# Patient Record
Sex: FEMALE | Race: OTHER | HISPANIC OR LATINO | Employment: OTHER | URBAN - METROPOLITAN AREA
[De-identification: names, ages, dates, MRNs, and addresses within clinical notes are randomized per-mention and may not be internally consistent; named-entity substitution may affect disease eponyms.]

---

## 2017-06-27 ENCOUNTER — APPOINTMENT (EMERGENCY)
Dept: RADIOLOGY | Facility: HOSPITAL | Age: 32
End: 2017-06-27
Payer: COMMERCIAL

## 2017-06-27 ENCOUNTER — HOSPITAL ENCOUNTER (EMERGENCY)
Facility: HOSPITAL | Age: 32
Discharge: HOME | End: 2017-06-27
Admitting: EMERGENCY MEDICINE
Payer: COMMERCIAL

## 2017-06-27 VITALS
WEIGHT: 239.7 LBS | SYSTOLIC BLOOD PRESSURE: 136 MMHG | HEART RATE: 103 BPM | TEMPERATURE: 99.8 F | OXYGEN SATURATION: 97 % | RESPIRATION RATE: 18 BRPM | DIASTOLIC BLOOD PRESSURE: 61 MMHG

## 2017-06-27 DIAGNOSIS — S50.10XA: ICD-10-CM

## 2017-06-27 DIAGNOSIS — S51.809A: Primary | ICD-10-CM

## 2017-06-27 PROCEDURE — 73090 X-RAY EXAM OF FOREARM: CPT

## 2017-06-27 PROCEDURE — 99283 EMERGENCY DEPT VISIT LOW MDM: CPT

## 2017-06-27 RX ORDER — LEVOFLOXACIN 500 MG/1
500 TABLET, FILM COATED ORAL DAILY
Qty: 7 TABLET | Refills: 0 | Status: SHIPPED | OUTPATIENT
Start: 2017-06-27 | End: 2017-07-05

## 2017-06-27 RX ORDER — NAPROXEN 500 MG/1
500 TABLET ORAL 2 TIMES DAILY WITH MEALS
Qty: 14 TABLET | Refills: 0 | Status: SHIPPED | OUTPATIENT
Start: 2017-06-27 | End: 2018-07-27

## 2017-06-27 RX ORDER — NAPROXEN 250 MG/1
500 TABLET ORAL ONCE
Status: COMPLETED | OUTPATIENT
Start: 2017-06-27 | End: 2017-06-27

## 2017-06-27 RX ADMIN — NAPROXEN 500 MG: 250 TABLET ORAL at 19:42

## 2017-07-05 ENCOUNTER — HOSPITAL ENCOUNTER (EMERGENCY)
Facility: HOSPITAL | Age: 32
End: 2017-07-05
Attending: EMERGENCY MEDICINE | Admitting: EMERGENCY MEDICINE
Payer: COMMERCIAL

## 2017-07-05 VITALS
TEMPERATURE: 98 F | HEART RATE: 78 BPM | RESPIRATION RATE: 18 BRPM | HEIGHT: 62 IN | OXYGEN SATURATION: 98 % | BODY MASS INDEX: 45.08 KG/M2 | DIASTOLIC BLOOD PRESSURE: 89 MMHG | WEIGHT: 245 LBS | SYSTOLIC BLOOD PRESSURE: 132 MMHG

## 2017-07-05 DIAGNOSIS — R45.851 SUICIDAL IDEATION: Primary | ICD-10-CM

## 2017-07-05 LAB
AMPHETAMINES SERPL QL SCN: NEGATIVE
BARBITURATES UR QL: NEGATIVE
BENZODIAZ UR QL: NEGATIVE
COCAINE UR QL: NEGATIVE
ETHANOL EXG-MCNC: 0 MG/DL
HCG UR QL: NORMAL
METHADONE UR QL: NEGATIVE
OPIATES UR QL SCN: NEGATIVE
PCP UR QL: NEGATIVE
THC UR QL: POSITIVE

## 2017-07-05 PROCEDURE — 82075 ASSAY OF BREATH ETHANOL: CPT | Performed by: EMERGENCY MEDICINE

## 2017-07-05 PROCEDURE — 80307 DRUG TEST PRSMV CHEM ANLYZR: CPT | Performed by: EMERGENCY MEDICINE

## 2017-07-05 PROCEDURE — 99285 EMERGENCY DEPT VISIT HI MDM: CPT

## 2017-07-05 PROCEDURE — 81025 URINE PREGNANCY TEST: CPT | Performed by: EMERGENCY MEDICINE

## 2017-07-05 RX ORDER — QUETIAPINE FUMARATE 25 MG/1
50 TABLET, FILM COATED ORAL ONCE
Status: COMPLETED | OUTPATIENT
Start: 2017-07-05 | End: 2017-07-05

## 2017-07-05 RX ORDER — BUPROPION HYDROCHLORIDE 200 MG/1
200 TABLET, EXTENDED RELEASE ORAL DAILY
COMMUNITY

## 2017-07-05 RX ORDER — NICOTINE 21 MG/24HR
21 PATCH, TRANSDERMAL 24 HOURS TRANSDERMAL ONCE
Status: DISCONTINUED | OUTPATIENT
Start: 2017-07-05 | End: 2017-07-06 | Stop reason: HOSPADM

## 2017-07-05 RX ORDER — ESCITALOPRAM OXALATE 20 MG/1
5 TABLET ORAL DAILY
COMMUNITY

## 2017-07-05 RX ORDER — ALPRAZOLAM 1 MG/1
TABLET ORAL
COMMUNITY
End: 2019-07-18

## 2017-07-05 RX ORDER — ALPRAZOLAM 0.5 MG/1
1 TABLET ORAL ONCE
Status: COMPLETED | OUTPATIENT
Start: 2017-07-05 | End: 2017-07-05

## 2017-07-05 RX ORDER — TRAZODONE HYDROCHLORIDE 100 MG/1
100 TABLET ORAL
Status: DISCONTINUED | OUTPATIENT
Start: 2017-07-05 | End: 2017-07-06 | Stop reason: HOSPADM

## 2017-07-05 RX ADMIN — NICOTINE 21 MG: 21 PATCH, EXTENDED RELEASE TRANSDERMAL at 16:48

## 2017-07-05 RX ADMIN — TRAZODONE HYDROCHLORIDE 100 MG: 100 TABLET ORAL at 21:09

## 2017-07-05 RX ADMIN — QUETIAPINE FUMARATE 50 MG: 25 TABLET, FILM COATED ORAL at 21:09

## 2017-07-05 RX ADMIN — ALPRAZOLAM 1 MG: 0.5 TABLET ORAL at 21:09

## 2018-07-27 ENCOUNTER — HOSPITAL ENCOUNTER (EMERGENCY)
Facility: HOSPITAL | Age: 33
Discharge: HOME | End: 2018-07-27
Attending: EMERGENCY MEDICINE | Admitting: EMERGENCY MEDICINE
Payer: COMMERCIAL

## 2018-07-27 ENCOUNTER — APPOINTMENT (EMERGENCY)
Dept: CT IMAGING | Facility: HOSPITAL | Age: 33
End: 2018-07-27
Payer: COMMERCIAL

## 2018-07-27 VITALS
SYSTOLIC BLOOD PRESSURE: 99 MMHG | TEMPERATURE: 98.8 F | OXYGEN SATURATION: 99 % | HEART RATE: 70 BPM | BODY MASS INDEX: 43.35 KG/M2 | DIASTOLIC BLOOD PRESSURE: 55 MMHG | WEIGHT: 237 LBS | RESPIRATION RATE: 16 BRPM

## 2018-07-27 DIAGNOSIS — R51.9 HEADACHE: Primary | ICD-10-CM

## 2018-07-27 LAB — EXT PREG TEST URINE: NEGATIVE

## 2018-07-27 PROCEDURE — 96366 THER/PROPH/DIAG IV INF ADDON: CPT

## 2018-07-27 PROCEDURE — 96375 TX/PRO/DX INJ NEW DRUG ADDON: CPT

## 2018-07-27 PROCEDURE — 81025 URINE PREGNANCY TEST: CPT | Performed by: EMERGENCY MEDICINE

## 2018-07-27 PROCEDURE — 70450 CT HEAD/BRAIN W/O DYE: CPT

## 2018-07-27 PROCEDURE — 96365 THER/PROPH/DIAG IV INF INIT: CPT

## 2018-07-27 PROCEDURE — 96361 HYDRATE IV INFUSION ADD-ON: CPT

## 2018-07-27 PROCEDURE — 99284 EMERGENCY DEPT VISIT MOD MDM: CPT

## 2018-07-27 RX ORDER — LIDOCAINE 50 MG/G
1 PATCH TOPICAL ONCE
Status: DISCONTINUED | OUTPATIENT
Start: 2018-07-27 | End: 2018-07-27 | Stop reason: HOSPADM

## 2018-07-27 RX ORDER — METOCLOPRAMIDE HYDROCHLORIDE 5 MG/ML
10 INJECTION INTRAMUSCULAR; INTRAVENOUS ONCE
Status: COMPLETED | OUTPATIENT
Start: 2018-07-27 | End: 2018-07-27

## 2018-07-27 RX ORDER — MAGNESIUM SULFATE HEPTAHYDRATE 40 MG/ML
2 INJECTION, SOLUTION INTRAVENOUS ONCE
Status: COMPLETED | OUTPATIENT
Start: 2018-07-27 | End: 2018-07-27

## 2018-07-27 RX ORDER — DIPHENHYDRAMINE HYDROCHLORIDE 50 MG/ML
25 INJECTION INTRAMUSCULAR; INTRAVENOUS ONCE
Status: COMPLETED | OUTPATIENT
Start: 2018-07-27 | End: 2018-07-27

## 2018-07-27 RX ORDER — KETOROLAC TROMETHAMINE 30 MG/ML
15 INJECTION, SOLUTION INTRAMUSCULAR; INTRAVENOUS ONCE
Status: COMPLETED | OUTPATIENT
Start: 2018-07-27 | End: 2018-07-27

## 2018-07-27 RX ADMIN — METOCLOPRAMIDE 10 MG: 5 INJECTION, SOLUTION INTRAMUSCULAR; INTRAVENOUS at 16:43

## 2018-07-27 RX ADMIN — KETOROLAC TROMETHAMINE 15 MG: 30 INJECTION, SOLUTION INTRAMUSCULAR at 16:40

## 2018-07-27 RX ADMIN — LIDOCAINE 1 PATCH: 50 PATCH TOPICAL at 17:57

## 2018-07-27 RX ADMIN — DIPHENHYDRAMINE HYDROCHLORIDE 25 MG: 50 INJECTION, SOLUTION INTRAMUSCULAR; INTRAVENOUS at 16:40

## 2018-07-27 RX ADMIN — SODIUM CHLORIDE 1000 ML: 0.9 INJECTION, SOLUTION INTRAVENOUS at 16:40

## 2018-07-27 RX ADMIN — MAGNESIUM SULFATE HEPTAHYDRATE 2 G: 40 INJECTION, SOLUTION INTRAVENOUS at 17:57

## 2018-07-27 NOTE — ED PROVIDER NOTES
History  Chief Complaint   Patient presents with    Headache     C/O headache, dizziness, and nausea x3 days denies hx of migrains took tylenol without relife     35year old female presents for evaluation of headache that started 3 days ago, gradual onset but became worse today  Constant, radiates from neck b/l up to temples  Patient states she gets headaches infrequently but this one is worse than previous  Associated nausea without vomiting  No changes in vision  Reports sensitivity to light  Denies trauma  No neuro deficits  Prior to Admission Medications   Prescriptions Last Dose Informant Patient Reported? Taking? ALPRAZolam (XANAX) 1 mg tablet   Yes No   Sig: Take by mouth daily at bedtime as needed for anxiety   QUEtiapine Fumarate (SEROQUEL PO)   Yes No   Sig: Take 50 mg by mouth daily at bedtime     TRAZODONE HCL PO   Yes No   Sig: Take 100 mg by mouth daily at bedtime     buPROPion (WELLBUTRIN SR) 200 MG 12 hr tablet   Yes No   Sig: Take 200 mg by mouth daily   escitalopram (LEXAPRO) 20 mg tablet   Yes No   Sig: Take 5 mg by mouth daily      Facility-Administered Medications: None       Past Medical History:   Diagnosis Date    Anxiety     Bipolar disease, chronic ([facility] [address] )        History reviewed  No pertinent surgical history  History reviewed  No pertinent family history  I have reviewed and agree with the history as documented  Social History   Substance Use Topics    Smoking status: Current Every Day Smoker     Packs/day: 0 50    Smokeless tobacco: Never Used    Alcohol use No        Review of Systems   Constitutional: Negative for chills, diaphoresis and fever  HENT: Negative for congestion and rhinorrhea  Eyes: Negative for pain and visual disturbance  Respiratory: Negative for cough, shortness of breath and wheezing  Cardiovascular: Negative for chest pain and leg swelling  Gastrointestinal: Negative for abdominal pain, diarrhea, nausea and vomiting  Genitourinary: Negative for difficulty urinating, dysuria, frequency and urgency  Musculoskeletal: Positive for neck pain  Negative for back pain and neck stiffness  Skin: Negative for color change and rash  Neurological: Positive for headaches  Negative for syncope and numbness  All other systems reviewed and are negative  Physical Exam  ED Triage Vitals   Temperature Pulse Respirations Blood Pressure SpO2   07/27/18 1549 07/27/18 1549 07/27/18 1549 07/27/18 1629 07/27/18 1549   98 8 °F (37 1 °C) 81 18 116/72 97 %      Temp Source Heart Rate Source Patient Position - Orthostatic VS BP Location FiO2 (%)   07/27/18 1549 07/27/18 1549 07/27/18 1549 07/27/18 1549 --   Oral Monitor Sitting Right arm       Pain Score       07/27/18 1549       Worst Possible Pain           Orthostatic Vital Signs  Vitals:    07/27/18 1549 07/27/18 1629 07/27/18 1802   BP:  116/72 99/55   Pulse: 81  70   Patient Position - Orthostatic VS: Sitting Lying Sitting       Physical Exam   Constitutional: She is oriented to person, place, and time  She appears well-developed and well-nourished  No distress  HENT:   Head: Normocephalic and atraumatic  Eyes: Conjunctivae and EOM are normal  Pupils are equal, round, and reactive to light  Visual fields intact in all 4 quadrants  Neck: Normal range of motion  Neck supple  Cardiovascular: Normal rate and regular rhythm  Pulmonary/Chest: Effort normal and breath sounds normal  No respiratory distress  She has no wheezes  She has no rales  Abdominal: Soft  Bowel sounds are normal  There is no tenderness  There is no guarding  Musculoskeletal: Normal range of motion  She exhibits no edema or tenderness  Very tender in b/l trapezius up to paraspinal neck  Neurological: She is alert and oriented to person, place, and time  She displays normal reflexes  No cranial nerve deficit or sensory deficit  She exhibits normal muscle tone   Coordination normal    HINTS exam reassuring, no dysdiadochokinesis, finger to nose intact, heel-to-shin intact, negative Romberg, able to ambulate  Skin: Skin is warm  She is not diaphoretic  No erythema  Psychiatric: She has a normal mood and affect  Her behavior is normal    Nursing note and vitals reviewed  ED Medications  Medications   lidocaine (LIDODERM) 5 % patch 1 patch (1 patch Transdermal Medication Applied 7/27/18 1757)   ketorolac (TORADOL) injection 15 mg (15 mg Intravenous Given 7/27/18 1640)   metoclopramide (REGLAN) injection 10 mg (10 mg Intravenous Given 7/27/18 1643)   sodium chloride 0 9 % bolus 1,000 mL (0 mL Intravenous Stopped 7/27/18 1756)   diphenhydrAMINE (BENADRYL) injection 25 mg (25 mg Intravenous Given 7/27/18 1640)   magnesium sulfate 2 g/50 mL IVPB (premix) 2 g (0 g Intravenous Stopped 7/27/18 1936)       Diagnostic Studies  Results Reviewed     Procedure Component Value Units Date/Time    POCT pregnancy, urine [[id number]]  (Normal) Resulted:  07/27/18 1618    Lab Status:  Final result Updated:  07/27/18 1618     EXT PREG TEST UR (Ref: Negative) Negative                 CT head without contrast   Final Result by Tarun Epperson MD (07/27 1925)      No acute intracranial abnormality  Workstation performed: [id number]               Procedures  Procedures      Phone Consults  ED Phone Contact    ED Course  ED Course as of Jul 27 1958 Fri Jul 27, 2018 1757 Upon reassessment, patient reports improvement of symptoms but headache still present  Will administer mag, lidoderm patch and reassess  1928 Patient reports full resolution of symptoms  Will DC, follow up with PCP  MDM  Number of Diagnoses or Management Options  Headache:   Diagnosis management comments: 35year old female presenting with tension headache  Will treat symptoms toradol, reglan, benadryl, fluids  Reassess  I have personally discussed discharge instructions with the patient   Advised to return to the emergency department immediately with worsening symptoms, pain, fever, vomiting or any further concerns  Patient also instructed to follow up with primary care provider as soon as possible for reassessment and further evaluation  CritCare Time    Disposition  Final diagnoses:   Headache     Time reflects when diagnosis was documented in both MDM as applicable and the Disposition within this note     Time User Action Codes Description Comment    7/27/2018  7:29 PM Floyd Lojayasmeen Add [R51] Headache       ED Disposition     ED Disposition Condition Comment    Discharge  [address] [place] [address] discharge to home/self care  Condition at discharge: Stable        Follow-up Information     Follow up With Specialties Details Why Contact Info Additional [facility] DET[facility] In 1 week For further evaluation [place] [id number]  [address] Emergency Department Emergency Medicine  If symptoms worsen [address] [facility] ED, [address]  , [place], [address] [facility], [address]          Discharge Medication List as of 7/27/2018  7:29 PM      CONTINUE these medications which have NOT CHANGED    Details   ALPRAZolam (XANAX) 1 mg tablet Take by mouth daily at bedtime as needed for anxiety, Historical Med      buPROPion (WELLBUTRIN SR) 200 MG 12 hr tablet Take 200 mg by mouth daily, Historical Med      escitalopram (LEXAPRO) 20 mg tablet Take 5 mg by mouth daily, Historical Med      QUEtiapine Fumarate (SEROQUEL PO) Take 50 mg by mouth daily at bedtime  , Historical Med      TRAZODONE HCL PO Take 100 mg by mouth daily at bedtime  , Historical Med           No discharge procedures on file  ED Provider  Attending physically available and evaluated [address] [place] [address]  I managed the patient along with the ED Attending      Electronically Signed by         [facility] Tesha Parrish DO  07/27/18 1958

## 2018-07-27 NOTE — ED ATTENDING ATTESTATION
Maryann Muñoz MD, saw and evaluated the patient  I have discussed the patient with the resident/non-physician practitioner and agree with the resident's/non-physician practitioner's findings, Plan of Care, and MDM as documented in the resident's/non-physician practitioner's note, except where noted  All available labs and Radiology studies were reviewed  At this point I agree with the current assessment done in the Emergency Department  I have conducted an independent evaluation of this patient a history and physical is as follows:  Patient with hx of headaches, c/o posterior neck and occipital headache, started 3 days back, came on gradually, no shortness/sharp onset, no fever, no N/V  On exam no acute distress stable, pupils equal round reactive light, no cranial nerve or focal neuro deficit  Will treat as migraine, give a Benadryl, Toradol, Reglan, IV fluids  Patient was re-evaluated, still having pain but no distress, we will do CT head at this point, give magnesium  CT scan does not show any acute abnormality  Patient feels better after migraine meds  We will discharge      Critical Care Time  CritCare Time    Procedures

## 2018-07-27 NOTE — DISCHARGE INSTRUCTIONS
Acute Headache, Ambulatory Care   GENERAL INFORMATION:   An acute headache  is pain or discomfort that starts suddenly and gets worse quickly  The cause of an acute headache may not be known  It may be triggered by stress, fatigue, hormones, food, or trauma  Common related symptoms include the following:   · Fever    · Sinus pressure    · Loss of memory    · Nausea or vomiting    · Problems with your vision, such as watery or red eyes, loss of vision, or pain in bright light    · Stiff neck    · Tenderness of the head and neck area    · Trouble staying awake, or being less alert than usual     · Weakness or less energy  Seek immediate care for the following symptoms:   · Severe pain    · A headache that occurs after a blow to the head, a fall, or other trauma     · Confusion or forgetfulness    · Numbness on one side of your face or body  Treatment for an acute headache  may include medicine to decrease pain  You may also need biofeedback or cognitive behavioral therapy  Ask your healthcare provider about these and other treatments for an acute headache  Manage my symptoms:   · Apply heat  on your head for 20 to 30 minutes every 2 hours for as many days as directed  Heat helps decrease pain and muscle spasms  You may alternate heat and ice  · Apply ice  on your head for 15 to 20 minutes every hour or as directed  Use an ice pack, or put crushed ice in a plastic bag  Cover it with a towel  Ice helps decrease pain  · Relax your muscles  Lie down in a comfortable position and close your eyes  Relax your muscles slowly  Start at your toes and work your way up your body  · Keep a record of your headaches  Write down when your headaches start and stop  Include your symptoms and what you were doing when the headache began  Record what you ate or drank for 24 hours before the headache started  Describe the pain and where it hurts  Keep track of what you did to treat your headache and whether it worked    Follow up with your healthcare provider as directed:  Bring your headache record with you when you see your healthcare provider  Write down your questions so you remember to ask them during your visits  CARE AGREEMENT:   You have the right to help plan your care  Learn about your health condition and how it may be treated  Discuss treatment options with your caregivers to decide what care you want to receive  You always have the right to refuse treatment  The above information is an  only  It is not intended as medical advice for individual conditions or treatments  Talk to your doctor, nurse or pharmacist before following any medical regimen to see if it is safe and effective for you  © 2014 [address] is for End User's use only and may not be sold, redistributed or otherwise used for commercial purposes  All illustrations and images included in CareNotes® are the copyrighted property of A D A M , Inc  or Jayme Cummings

## 2018-07-27 NOTE — ED NOTES
Patient given ham sandwich, pretzels and water per request, Dr Benjamin Alert aware       Titus Diaz, MADIE  07/27/18 3354

## 2018-10-13 ENCOUNTER — HOSPITAL ENCOUNTER (EMERGENCY)
Facility: HOSPITAL | Age: 33
Discharge: HOME | End: 2018-10-13
Attending: EMERGENCY MEDICINE
Payer: COMMERCIAL

## 2018-10-13 VITALS
OXYGEN SATURATION: 99 % | WEIGHT: 243 LBS | BODY MASS INDEX: 44.45 KG/M2 | DIASTOLIC BLOOD PRESSURE: 72 MMHG | SYSTOLIC BLOOD PRESSURE: 151 MMHG | TEMPERATURE: 98.1 F | HEART RATE: 77 BPM | RESPIRATION RATE: 20 BRPM

## 2018-10-13 DIAGNOSIS — B35.3 ATHLETE'S FOOT, LEFT: Primary | ICD-10-CM

## 2018-10-13 DIAGNOSIS — S90.822A BLISTER OF LEFT FOOT: ICD-10-CM

## 2018-10-13 DIAGNOSIS — B35.3 ATHLETE'S FOOT ON RIGHT: ICD-10-CM

## 2018-10-13 PROCEDURE — 99282 EMERGENCY DEPT VISIT SF MDM: CPT

## 2018-10-13 RX ORDER — TOLNAFTATE 1 G/100G
POWDER TOPICAL 2 TIMES DAILY
Qty: 45 G | Refills: 0 | Status: SHIPPED | OUTPATIENT
Start: 2018-10-13 | End: 2019-05-19

## 2018-10-13 RX ORDER — IBUPROFEN 400 MG/1
400 TABLET ORAL EVERY 6 HOURS PRN
Qty: 30 TABLET | Refills: 0 | Status: SHIPPED | OUTPATIENT
Start: 2018-10-13

## 2018-10-13 RX ORDER — ACETAMINOPHEN 500 MG
500 TABLET ORAL EVERY 6 HOURS PRN
Qty: 30 TABLET | Refills: 0 | Status: SHIPPED | OUTPATIENT
Start: 2018-10-13 | End: 2020-02-28

## 2018-10-13 NOTE — ED PROVIDER NOTES
History  Chief Complaint   Patient presents with    Wound Check     hx of diabeties noted wound on right foot Monday not getting better  Yesterday with fever, runny nose, sore throat and left ear pain       History provided by:  Patient (Male significant other in room)  Foot Injury - Major   Location:  Foot (Left foot at the heel )  Time since incident:  5 days  Injury: yes    Pain details:     Quality:  Aching    Radiates to:  Does not radiate    Severity:  Mild    Onset quality:  Gradual  Chronicity:  New  Foreign body present:  No foreign bodies  Prior injury to area:  No  Worsened by:  Nothing  Ineffective treatments:  None tried  Associated symptoms: swelling    Associated symptoms: no back pain, no decreased ROM, no fatigue, no fever, no itching, no muscle weakness, no numbness, no stiffness and no tingling    Risk factors: obesity        Prior to Admission Medications   Prescriptions Last Dose Informant Patient Reported? Taking? ALPRAZolam (XANAX) 1 mg tablet   Yes No   Sig: Take by mouth daily at bedtime as needed for anxiety   QUEtiapine Fumarate (SEROQUEL PO)  Self Yes No   Sig: Take 300 mg by mouth daily at bedtime     TRAZODONE HCL PO  Self Yes No   Sig: Take 150 mg by mouth daily at bedtime     buPROPion (WELLBUTRIN SR) 200 MG 12 hr tablet   Yes No   Sig: Take 200 mg by mouth daily   escitalopram (LEXAPRO) 20 mg tablet   Yes No   Sig: Take 5 mg by mouth daily      Facility-Administered Medications: None       Past Medical History:   Diagnosis Date    Anxiety     B[facility] disease, chronic ([facility] [address] )     Diabetes mellitus ([facility] )        History reviewed  No pertinent surgical history  History reviewed  No pertinent family history  I have reviewed and agree with the history as documented      Social History   Substance Use Topics    Smoking status: Current Every Day Smoker     Packs/day: 0 50    Smokeless tobacco: Never Used    Alcohol use No        Review of Systems   Constitutional: Negative for fatigue and fever  HENT: Negative for facial swelling  Eyes: Negative for pain  Respiratory: Negative for cough  Cardiovascular: Negative for leg swelling  Gastrointestinal: Negative for abdominal pain  Endocrine: Negative for polydipsia  Genitourinary: Negative for frequency  Musculoskeletal: Negative for back pain and stiffness  Skin: Negative for itching  Left heel blister  Allergic/Immunologic: Negative for food allergies  Neurological: Negative for headaches  Hematological: Does not bruise/bleed easily  Psychiatric/Behavioral: Positive for behavioral problems  Physical Exam  Physical Exam   Constitutional: She is oriented to person, place, and time  She appears well-developed and well-nourished  HENT:   Head: Normocephalic and atraumatic  Eyes: Conjunctivae are normal    Neck: Neck supple  No JVD present  Cardiovascular: Normal rate  Pulmonary/Chest: Effort normal    Abdominal: She exhibits no distension  Genitourinary:   Genitourinary Comments: No complaints deferred  Musculoskeletal: She exhibits no edema or deformity  Patient states edema bilateral feet but no appreciable pitting edema and/or signs of swelling bilaterally  Neurological: She is alert and oriented to person, place, and time  Skin: Skin is warm and dry  Capillary refill takes less than 2 seconds  Patient has skin condition consistent with tinea pedis bilateral feet  Please see picture of open blister left heel  No evidence of infection and/or cellulitis  Psychiatric: She has a normal mood and affect             Vital Signs  ED Triage Vitals [10/13/18 1944]   Temperature Pulse Respirations Blood Pressure SpO2   98 1 °F (36 7 °C) 77 20 151/72 99 %      Temp Source Heart Rate Source Patient Position - Orthostatic VS BP Location FiO2 (%)   Oral Monitor Sitting Right arm --      Pain Score       Worst Possible Pain           Vitals:    10/13/18 1944   BP: 151/72   Pulse: 77   Patient Position - Orthostatic VS: Sitting       Visual Acuity      ED Medications  Medications - No data to display    Diagnostic Studies  Results Reviewed     None                 No orders to display              Procedures  Procedures       Phone Contacts  ED Phone Contact    ED Course                               MDM  Number of Diagnoses or Management Options  Athlete's foot on right:   Athlete's foot, left:   Blister of left foot:   Diagnosis management comments: 27-year-old female presents emergency department for blister to left heel  Patient states has been present for approximately five days  Patient concern for infection  Patient states that she is prediabetic  Patient denies any type of diabetic evaluation and/or follow-up with her primary  Wound appears to be an open blister but no evidence of infection at this time  At this time will treat conservatively and educate patient on how to treat her blister at home with decreasing friction to the area  Also educated on how to clean the area  An want to look for for progression of any type of infection  Do not feel that x-rays will aid in diagnosis or management  Do not feel that antibiotics be of use at this point  will Will refer patient to our wound clinic and the patient may call on Monday for evaluation  Patient was encouraged to follow up with her known primary care for further evaluation and management of her possible prediabetic condition  Patient also educated on how to take care of her athlete's foot  Patient educated on persistent or worsening signs symptoms and to follow up with primary care wound care and/or return to the emergency department  Patient and male significant other admit to understanding and agreement  Patient was discharged in ambulatory stable condition      CritCare Time    Disposition  Final diagnoses:   Athlete's foot, left   Athlete's foot on right   Blister of left foot     Time reflects when diagnosis was documented in both MDM as applicable and the Disposition within this note     Time User Action Codes Description Comment    10/13/2018  8:26 PM Arleen Flores Ludmila Pun of right foot     10/13/2018  8:27 PM Romel Piedra Or Add [B35 3] Athlete's foot, left     10/13/2018  8:27 PM Arleen Jaramillo Add [B35 3] Athlete's foot on right     10/13/2018  8:36 PM Arleen Jaramillo Modify [B35 3] Athlete's foot, left     10/13/2018  8:36 PM Candelaria Piedra [[id number]] Blister of right foot     10/13/2018  8:36 PM Romel Piedra Or Mark [[id number]] Blister of left foot       ED Disposition     ED Disposition Condition Comment    Discharge  Artdeeptiomer Liraj discharge to home/self care  Condition at discharge: Stable        Follow-up Information     Follow up With Specialties Details Why [address] [facility] [address], [address] [facility], Nurse Practitioner   [address] [address]  [phone]         Follow-up with your primary care soon as possible for evaluation of your prediabetic condition             Discharge Medication List as of 10/13/2018  8:39 PM      START taking these medications    Details   acetaminophen (TYLENOL) 500 mg tablet Take 1 tablet (500 mg total) by mouth every 6 (six) hours as needed for mild pain, moderate pain, headaches or fever, Starting Sat 10/13/2018, Normal      ibuprofen (MOTRIN) 400 mg tablet Take 1 tablet (400 mg total) by mouth every 6 (six) hours as needed for mild pain, moderate pain or fever, Starting Sat 10/13/2018, Normal      tolnaftate (TINACTIN) 1 % powder Apply topically 2 (two) times a day, Starting Sat 10/13/2018, Normal         CONTINUE these medications which have NOT CHANGED    Details   ALPRAZolam (XANAX) 1 mg tablet Take by mouth daily at bedtime as needed for anxiety, Historical Med      buPROPion (WELLBUTRIN SR) 200 MG 12 hr tablet Take 200 mg by mouth daily, Historical Med      escitalopram (LEXAPRO) 20 mg tablet Take 5 mg by mouth daily, Historical Med      QUEtiapine Fumarate (SEROQUEL PO) Take 300 mg by mouth daily at bedtime  , Historical Med      TRAZODONE HCL PO Take 150 mg by mouth daily at bedtime  , Historical Med           No discharge procedures on file      ED Provider  Electronically Signed by           Mervin Cornell PA-C  10/13/18 7297

## 2018-10-14 NOTE — DISCHARGE INSTRUCTIONS
Athlete's Foot   WHAT YOU NEED TO KNOW:   Athlete's foot is a foot infection caused by a fungus  DISCHARGE INSTRUCTIONS:   Return to the emergency department if:   · You have a fever or chills  · You have red streaks going up your leg  Contact your healthcare provider if:   · Your infection spreads to other parts of your body  · Your infection is not better in 14 days or is not completely gone in 90 days  · The skin on your foot or leg is red and hot  · You have questions or concerns about your condition or care  Medicines:   · Antifungal medicine  may be given as a cream or pill  You may need a doctor's order for this medicine  Take the medicine until it is gone, even if your feet look like they are healed  · Take your medicine as directed  Contact your healthcare provider if you think your medicine is not helping or if you have side effects  Tell him or her if you are allergic to any medicine  Keep a list of the medicines, vitamins, and herbs you take  Include the amounts, and when and why you take them  Bring the list or the pill bottles to follow-up visits  Carry your medicine list with you in case of an emergency  Follow up with your healthcare provider as directed:  Write down your questions so you remember to ask them during your visits  Prevent the spread of athlete's foot:   · Prevent the spread of this infection to other parts of your body  When you shower, dry your groin area and other parts of your body before you dry your feet  · Keep your feet clean and dry  Wash your feet each day and dry them well, especially between your toes  After your feet are dry, put powder on your feet and between your toes  Wear clean cotton or wool socks each day  Put your socks on first so you do not spread the infection to other areas of your body  Wear sandals, canvas tennis shoes, or other shoes that allow air to flow to your feet  This helps keep your feet dry   Do not use shoes that are tight, or made of plastic or rubber  · Soak your feet in an astringent (drying) solution as directed  if you have blisters  You may need to do this for 20 to 30 minutes, 2 times each day to help dry out the blisters  · Wear shoes in public areas  Wear shower shoes or sandals in warm, damp areas  This includes shower stalls, near swimming pools, and locker rooms  Do not share socks or shoes  Do not use public swimming pools  © 2017 [facility] Information is for End User's use only and may not be sold, redistributed or otherwise used for commercial purposes  All illustrations and images included in CareNotes® are the copyrighted property of A D A M , Inc  or Jayme Cummings  The above information is an  only  It is not intended as medical advice for individual conditions or treatments  Talk to your doctor, nurse or pharmacist before following any medical regimen to see if it is safe and effective for you  Blister   WHAT YOU NEED TO KNOW:   Blisters are fluid-filled pockets on the surface of your skin  A blister forms when hot or moist skin rubs or pushes against an object repeatedly  Blisters mostly occur on body parts that are used often, or move freely, such as your hands or feet  Pain can be mild or severe, depending on the size of your blister  DISCHARGE INSTRUCTIONS:   Contact your healthcare provider if:   · You have increased pain, redness, and swelling  · There is a bad-smelling fluid coming from your blister  · Your blister does not heal within the amount of time your healthcare provider says it should  · You have a fever, chills, or body aches  · You have questions or concerns about your condition or care  Care for your blister:  Do not pop your blister or tear the skin on it  This could cause infection and slow the healing process   The following will help protect your blister area:  · Wash your hands before you care for your blister  to help prevent infection  Use soap and water  Wash your hands often, and after you use the bathroom, change a child's diapers, or sneeze  · Clean your blister as directed  Carefully remove your bandage  If the bandage sticks to your blister, pour saline on it  This will help the bandage come off more easily and prevent further skin damage  Wash the blister area with soap or saline and water  Gently pat the area dry  · Look for signs of infection  Check the area around your blister for swelling, redness, or pain  · Apply clean bandages as directed  Change your bandages when they get wet, dirty, or soaked with fluid  Your healthcare provider may tell you to use certain moist bandages or hydrogels to prevent them from sticking to your wound  You may need a bandage that absorbs well if your blister has excess fluid  You may be told to wear a second bandage for extra protection  · Take medicine for pain as directed  Prevent another blister:   · Wear synthetic clothing  Acrylic-blend, and moisture-wicking fabrics will help prevent moisture buildup and friction on your skin  These fabrics will also prevent your blister from sticking to them  · Use lubricating ointment  Emollient or silicone ointments may prevent blisters during activities that last 1 hour or less  Do not use them for activities that will last longer than 1 hour  · Wear antiperspirant on your feet as directed  Reduced moisture on your feet will help prevent blisters  · Wear shoes that fit well  Shoes that rub your feet may cause or worsen blisters  Wear cushioned insoles as directed  Follow up with your healthcare provider as directed: You may need to return to have your blister drained if it is large  Write down your questions so you remember to ask them during your visits  © 2017 [employer] Information is for End User's use only and may not be sold, redistributed or otherwise used for commercial purposes  All illustrations and images included in CareNotes® are the copyrighted property of A D A M , Inc  or Jayme Cummings  The above information is an  only  It is not intended as medical advice for individual conditions or treatments  Talk to your doctor, nurse or pharmacist before following any medical regimen to see if it is safe and effective for you

## 2019-01-04 ENCOUNTER — HOSPITAL ENCOUNTER (EMERGENCY)
Facility: HOSPITAL | Age: 34
Discharge: HOME | End: 2019-01-04
Attending: EMERGENCY MEDICINE
Payer: COMMERCIAL

## 2019-01-04 VITALS
HEART RATE: 91 BPM | HEIGHT: 63 IN | SYSTOLIC BLOOD PRESSURE: 144 MMHG | OXYGEN SATURATION: 100 % | WEIGHT: 241 LBS | TEMPERATURE: 97.9 F | RESPIRATION RATE: 20 BRPM | DIASTOLIC BLOOD PRESSURE: 82 MMHG | BODY MASS INDEX: 42.7 KG/M2

## 2019-01-04 DIAGNOSIS — J06.9 UPPER RESPIRATORY INFECTION: Primary | ICD-10-CM

## 2019-01-04 LAB
FLUAV + FLUBV RNA ISLT NAA+PROBE: NOT DETECTED
FLUAV + FLUBV RNA ISLT NAA+PROBE: NOT DETECTED

## 2019-01-04 PROCEDURE — 99283 EMERGENCY DEPT VISIT LOW MDM: CPT

## 2019-01-04 PROCEDURE — 87502 INFLUENZA DNA AMP PROBE: CPT | Performed by: PHYSICIAN ASSISTANT

## 2019-01-04 RX ORDER — ONDANSETRON 4 MG/1
4 TABLET, ORALLY DISINTEGRATING ORAL ONCE
Status: COMPLETED | OUTPATIENT
Start: 2019-01-04 | End: 2019-01-04

## 2019-01-04 RX ORDER — ONDANSETRON 4 MG/1
4 TABLET, FILM COATED ORAL EVERY 6 HOURS
Qty: 15 TABLET | Refills: 0 | Status: SHIPPED | OUTPATIENT
Start: 2019-01-04 | End: 2019-10-10

## 2019-01-04 RX ORDER — IBUPROFEN 600 MG/1
600 TABLET ORAL ONCE
Status: COMPLETED | OUTPATIENT
Start: 2019-01-04 | End: 2019-01-04

## 2019-01-04 RX ORDER — FLUTICASONE PROPIONATE 50 MCG
1 SPRAY, SUSPENSION (ML) NASAL DAILY
Qty: 16 G | Refills: 0 | Status: SHIPPED | OUTPATIENT
Start: 2019-01-04 | End: 2019-07-18

## 2019-01-04 RX ADMIN — ONDANSETRON 4 MG: 4 TABLET, ORALLY DISINTEGRATING ORAL at 17:14

## 2019-01-04 RX ADMIN — IBUPROFEN 600 MG: 600 TABLET ORAL at 18:08

## 2019-01-04 NOTE — ED PROVIDER NOTES
History  Chief Complaint   Patient presents with    Flu Symptoms     I started with total body aches, fever, chlls , frontal headache, sore throat, vomiting(x9 ) and diarrhea(x 10 at least)  I have no appitite  I had only ginger ale and Tylenol  Patient is a previously healthy 40-year-old female who presents today with a chief complaint of 3 days worth of headaches, body aches, subjective fevers, chills, cough, nasal congestion, sore throat, ear fullness, nausea, vomiting, diarrhea  The patient reports the symptoms all began at the same time  Patient reports she has had no shortness of breath, no chest pain, no lightheadedness  Patient reports she has been trying to eat small amounts of food and has been able to keep solid food down up until today when she has been vomiting each time she tries to eat solid food  Patient reports she has been drinking ginger ale and has been able to take Tylenol which has been helping her symptoms  Patient reports she felt very tired today and the body aches and other symptoms became too much and she decided to come to the [facility]  Patient reports she did receive her flu vaccine this year and has not had sick contacts to her knowledge            History provided by:  Patient   used: No    Flu Symptoms   Presenting symptoms: cough, diarrhea, fatigue, fever, headache, myalgias, nausea, rhinorrhea, sore throat and vomiting    Presenting symptoms: no shortness of breath    Cough:     Cough characteristics:  Productive    Sputum characteristics:  Clear    Severity:  Moderate    Onset quality:  Gradual    Duration:  3 days    Timing:  Constant    Progression:  Unchanged  Diarrhea:     Quality:  Watery    Severity:  Moderate    Duration:  3 days    Timing:  Constant    Progression:  Unchanged  Severity:  Moderate  Onset quality:  Gradual  Duration:  3 days  Progression:  Unchanged  Chronicity:  New  Relieved by:  Nothing  Worsened by:  Nothing  Ineffective treatments:  None tried  Associated symptoms: chills, decreased appetite, decreased physical activity, ear pain and nasal congestion    Associated symptoms: no mental status change, no neck stiffness and no syncope        Prior to Admission Medications   Prescriptions Last Dose Informant Patient Reported? Taking? ALPRAZolam (XANAX) 1 mg tablet   Yes No   Sig: Take by mouth daily at bedtime as needed for anxiety   QUEtiapine Fumarate (SEROQUEL PO)  Self Yes No   Sig: Take 300 mg by mouth daily at bedtime     TRAZODONE HCL PO  Self Yes No   Sig: Take 150 mg by mouth daily at bedtime     acetaminophen (TYLENOL) 500 mg tablet   No No   Sig: Take 1 tablet (500 mg total) by mouth every 6 (six) hours as needed for mild pain, moderate pain, headaches or fever   buPROPion (WELLBUTRIN SR) 200 MG 12 hr tablet   Yes No   Sig: Take 200 mg by mouth daily   escitalopram (LEXAPRO) 20 mg tablet   Yes No   Sig: Take 5 mg by mouth daily   ibuprofen (MOTRIN) 400 mg tablet   No No   Sig: Take 1 tablet (400 mg total) by mouth every 6 (six) hours as needed for mild pain, moderate pain or fever   tolnaftate (TINACTIN) 1 % powder   No No   Sig: Apply topically 2 (two) times a day      Facility-Administered Medications: None       Past Medical History:   Diagnosis Date    Anxiety     Bipolar disease, chronic ([facility] )     Diabetes mellitus ([facility] )        History reviewed  No pertinent surgical history  History reviewed  No pertinent family history  I have reviewed and agree with the history as documented  Social History   Substance Use Topics    Smoking status: Current Every Day Smoker     Packs/day: 0 50    Smokeless tobacco: Never Used    Alcohol use No        Review of Systems   Constitutional: Positive for chills, decreased appetite, fatigue and fever  HENT: Positive for congestion, ear pain, postnasal drip, rhinorrhea and sore throat  Eyes: Negative for redness  Respiratory: Positive for cough   Negative for apnea, choking, chest tightness, shortness of breath, wheezing and stridor  Cardiovascular: Positive for chest pain ( only when coughing)  Negative for palpitations  Gastrointestinal: Positive for diarrhea, nausea and vomiting  Negative for abdominal pain  Genitourinary: Negative for dysuria and hematuria  Musculoskeletal: Positive for myalgias  Negative for neck stiffness  Skin: Negative for rash  Neurological: Positive for headaches  Negative for dizziness, syncope, light-headedness and numbness  Physical Exam  Physical Exam   Constitutional: She is oriented to person, place, and time  She appears well-developed and well-nourished  HENT:   Head: Normocephalic  Right Ear: No mastoid tenderness  Tympanic membrane is not erythematous and not bulging  A middle ear effusion is present  Left Ear: No mastoid tenderness  Tympanic membrane is not erythematous and not bulging  A middle ear effusion is present  Nose: Rhinorrhea present  Mouth/Throat: No oropharyngeal exudate, posterior oropharyngeal edema, posterior oropharyngeal erythema or tonsillar abscesses  Moderate postnasal drip noted     Eyes: No scleral icterus  Cardiovascular: Normal rate and regular rhythm  Pulmonary/Chest: Effort normal and breath sounds normal  No stridor  No respiratory distress  She has no wheezes  She has no rales  She exhibits no tenderness  Abdominal: Soft  She exhibits no distension  There is no tenderness  Musculoskeletal: Normal range of motion  Neurological: She is alert and oriented to person, place, and time  Skin: Skin is warm and dry  Capillary refill takes less than 2 seconds  Psychiatric: She has a normal mood and affect  Nursing note and vitals reviewed        Vital Signs  ED Triage Vitals [01/04/19 1653]   Temperature Pulse Respirations Blood Pressure SpO2   97 9 °F (36 6 °C) 91 20 144/82 100 %      Temp src Heart Rate Source Patient Position - Orthostatic VS BP Location FiO2 (%)   -- Monitor Sitting Left arm --      Pain Score       7           Vitals:    01/04/19 1653   BP: 144/82   Pulse: 91   Patient Position - Orthostatic VS: Sitting       Visual Acuity      ED Medications  Medications   ondansetron (ZOFRAN-ODT) dispersible tablet 4 mg (4 mg Oral Given 1/4/19 1714)   ibuprofen (MOTRIN) tablet 600 mg (600 mg Oral Given 1/4/19 1808)       Diagnostic Studies  Results Reviewed     Procedure Component Value Units Date/Time    Rapid Flu-Viral RNA amplification [facility] HEART ONLY) [[id number]]  (Normal) Collected:  01/04/19 1716    Lab Status:  Final result Specimen:  Nares from Nose Updated:  01/04/19 1832     INFLUENZA A AMPLIFIED RNA Not Detected     INFLUENZA B AMPLIFIED Not Detected                 No orders to display              Procedures  Procedures       Phone Contacts  ED Phone Contact    ED Course  ED Course as of Jan 04 1839   Kendy Marin Jan 04, 2019   1743 Patient states she is feeling more comfortable but has headache still, will give motrin      1834 Patient able to eat and drink, reports pain is tolerable at this time                                MDM  CritCare Time    Disposition  Final diagnoses:   Upper respiratory infection     Time reflects when diagnosis was documented in both MDM as applicable and the Disposition within this note     Time User Action Codes Description Comment    1/4/2019  6:31 PM Ana Pruitt Add [J06 9] Upper respiratory infection       ED Disposition     ED Disposition Condition Comment    Discharge  Ruben Mann discharge to home/self care      Condition at discharge: Good        Follow-up Information     Follow up With Specialties Details Why Contact Matthew Howell DO Family Medicine Schedule an appointment as soon as possible for a visit  [address] Ter[address] [facility]  [phone]            Discharge Medication List as of 1/4/2019  6:33 PM      START taking these medications    Details   fluticasone (FLONASE) 50 mcg/act nasal spray 1 spray into each nostril daily, Starting Fri 1/4/2019, Print      ondansetron (ZOFRAN) 4 mg tablet Take 1 tablet (4 mg total) by mouth every 6 (six) hours, Starting Fri 1/4/2019, Print         CONTINUE these medications which have NOT CHANGED    Details   acetaminophen (TYLENOL) 500 mg tablet Take 1 tablet (500 mg total) by mouth every 6 (six) hours as needed for mild pain, moderate pain, headaches or fever, Starting Sat 10/13/2018, Normal      ALPRAZolam (XANAX) 1 mg tablet Take by mouth daily at bedtime as needed for anxiety, Historical Med      buPROPion (WELLBUTRIN SR) 200 MG 12 hr tablet Take 200 mg by mouth daily, Historical Med      escitalopram (LEXAPRO) 20 mg tablet Take 5 mg by mouth daily, Historical Med      ibuprofen (MOTRIN) 400 mg tablet Take 1 tablet (400 mg total) by mouth every 6 (six) hours as needed for mild pain, moderate pain or fever, Starting Sat 10/13/2018, Normal      QUEtiapine Fumarate (SEROQUEL PO) Take 300 mg by mouth daily at bedtime  , Historical Med      tolnaftate (TINACTIN) 1 % powder Apply topically 2 (two) times a day, Starting Sat 10/13/2018, Normal      TRAZODONE HCL PO Take 150 mg by mouth daily at bedtime  , Historical Med           No discharge procedures on file      ED Provider  Electronically Signed by           Rani Jon PA-C  01/04/19 8456

## 2019-01-04 NOTE — DISCHARGE INSTRUCTIONS
Acute Bronchitis   WHAT YOU NEED TO KNOW:   Acute bronchitis is swelling and irritation in the air passages of your lungs  This irritation may cause you to cough or have other breathing problems  Acute bronchitis often starts because of another illness, such as a cold or the flu  The illness spreads from your nose and throat to your windpipe and airways  Bronchitis is often called a chest cold  Acute bronchitis lasts about 3 to 6 weeks and is usually not a serious illness  Your cough can last for several weeks  DISCHARGE INSTRUCTIONS:   Return to the emergency department if:   · You cough up blood  · Your lips or fingernails turn blue  · You feel like you are not getting enough air when you breathe  Contact your healthcare provider if:   · You have a fever  · Your breathing problems do not go away or get worse  · Your cough does not get better within 4 weeks  · You have questions or concerns about your condition or care  Self-care:   · Get more rest   Rest helps your body to heal  Slowly start to do more each day  Rest when you feel it is needed  · Avoid irritants in the air  Avoid chemicals, fumes, and dust  Wear a face mask if you must work around dust or fumes  Stay inside on days when air pollution levels are high  If you have allergies, stay inside when pollen counts are high  Do not use aerosol products, such as spray-on deodorant, bug spray, and hair spray  · Do not smoke or be around others who smoke  Nicotine and other chemicals in cigarettes and cigars damages the cilia that move mucus out of your lungs  Ask your healthcare provider for information if you currently smoke and need help to quit  E-cigarettes or smokeless tobacco still contain nicotine  Talk to your healthcare provider before you use these products  · Drink liquids as directed  Liquids help keep your air passages moist and help you cough up mucus   You may need to drink more liquids when you have acute bronchitis  Ask how much liquid to drink each day and which liquids are best for you  · Use a humidifier or vaporizer  Use a cool mist humidifier or a vaporizer to increase air moisture in your home  This may make it easier for you to breathe and help decrease your cough  Decrease risk for acute bronchitis:   · Get the vaccinations you need  Ask your healthcare provider if you should get vaccinated against the flu or pneumonia  · Prevent the spread of germs  You can decrease your risk of acute bronchitis and other illnesses by doing the following:     [facility] your hands often with soap and water  Carry germ-killing hand lotion or gel with you  You can use the lotion or gel to clean your hands when soap and water are not available  ¨ Do not touch your eyes, nose, or mouth unless you have washed your hands first     ¨ Always cover your mouth when you cough to prevent the spread of germs  It is best to cough into a tissue or your shirt sleeve instead of into your hand  Ask those around you cover their mouths when they cough  ¨ Try to avoid people who have a cold or the flu  If you are sick, stay away from others as much as possible  Medicines: Your healthcare provider may  give you any of the following:  · Ibuprofen or acetaminophen  are medicines that help lower your fever  They are available without a doctor's order  Ask your healthcare provider which medicine is right for you  Ask how much to take and how often to take it  Follow directions  These medicines can cause stomach bleeding if not taken correctly  Ibuprofen can cause kidney damage  Do not take ibuprofen if you have kidney disease, an ulcer, or allergies to aspirin  Acetaminophen can cause liver damage  Do not take more than 4,000 milligrams in 24 hours  · Decongestants  help loosen mucus in your lungs and make it easier to cough up  This can help you breathe easier  · Cough suppressants  decrease your urge to cough   If your cough produces mucus, do not take a cough suppressant unless your healthcare provider tells you to  Your healthcare provider may suggest that you take a cough suppressant at night so you can rest     · Inhalers  may be given  Your healthcare provider may give you one or more inhalers to help you breathe easier and cough less  An inhaler gives your medicine to open your airways  Ask your healthcare provider to show you how to use your inhaler correctly  · Take your medicine as directed  Contact your healthcare provider if you think your medicine is not helping or if you have side effects  Tell him of her if you are allergic to any medicine  Keep a list of the medicines, vitamins, and herbs you take  Include the amounts, and when and why you take them  Bring the list or the pill bottles to follow-up visits  Carry your medicine list with you in case of an emergency  Follow up with your healthcare provider as directed:  Write down questions you have so you will remember to ask them during your follow-up visits  © 2017 [address] [employer] Information is for End User's use only and may not be sold, redistributed or otherwise used for commercial purposes  All illustrations and images included in CareNotes® are the copyrighted property of A D A [employer] Inc  or Jayme Cummings  The above information is an  only  It is not intended as medical advice for individual conditions or treatments  Talk to your doctor, nurse or pharmacist before following any medical regimen to see if it is safe and effective for you

## 2019-05-19 ENCOUNTER — APPOINTMENT (EMERGENCY)
Dept: CT IMAGING | Facility: HOSPITAL | Age: 34
End: 2019-05-19
Payer: COMMERCIAL

## 2019-05-19 ENCOUNTER — HOSPITAL ENCOUNTER (EMERGENCY)
Facility: HOSPITAL | Age: 34
Discharge: HOME | End: 2019-05-19
Attending: EMERGENCY MEDICINE | Admitting: EMERGENCY MEDICINE
Payer: COMMERCIAL

## 2019-05-19 VITALS
WEIGHT: 235 LBS | TEMPERATURE: 97.9 F | OXYGEN SATURATION: 99 % | DIASTOLIC BLOOD PRESSURE: 71 MMHG | RESPIRATION RATE: 20 BRPM | HEART RATE: 69 BPM | BODY MASS INDEX: 41.63 KG/M2 | SYSTOLIC BLOOD PRESSURE: 134 MMHG

## 2019-05-19 DIAGNOSIS — R10.13 EPIGASTRIC PAIN: Primary | ICD-10-CM

## 2019-05-19 LAB
ALBUMIN SERPL BCP-MCNC: 4.4 G/DL (ref 3–5.2)
ALP SERPL-CCNC: 67 U/L (ref 43–122)
ALT SERPL W P-5'-P-CCNC: 19 U/L (ref 9–52)
ANION GAP SERPL CALCULATED.3IONS-SCNC: 11 MMOL/L (ref 5–14)
AST SERPL W P-5'-P-CCNC: 31 U/L (ref 14–36)
BACTERIA UR QL AUTO: ABNORMAL /HPF
BILIRUB SERPL-MCNC: 0.7 MG/DL
BILIRUB UR QL STRIP: NEGATIVE
BUN SERPL-MCNC: 13 MG/DL (ref 5–25)
CALCIUM SERPL-MCNC: 9.5 MG/DL (ref 8.4–10.2)
CHLORIDE SERPL-SCNC: 108 MMOL/L (ref 97–108)
CLARITY UR: ABNORMAL
CO2 SERPL-SCNC: 20 MMOL/L (ref 22–30)
COLOR UR: ABNORMAL
CREAT SERPL-MCNC: 0.62 MG/DL (ref 0.6–1.2)
ERYTHROCYTE [DISTWIDTH] IN BLOOD BY AUTOMATED COUNT: 17.4 %
EXT PREG TEST URINE: NEGATIVE
GFR SERPL CREATININE-BSD FRML MDRD: 118 ML/MIN/1.73SQ M
GLUCOSE SERPL-MCNC: 140 MG/DL (ref 70–99)
GLUCOSE UR STRIP-MCNC: NEGATIVE MG/DL
HCT VFR BLD AUTO: 40.8 % (ref 36–46)
HGB BLD-MCNC: 12.7 G/DL (ref 12–16)
HGB UR QL STRIP.AUTO: 150
KETONES UR STRIP-MCNC: ABNORMAL MG/DL
LEUKOCYTE ESTERASE UR QL STRIP: 100
LIPASE SERPL-CCNC: 47 U/L (ref 23–300)
LYMPHOCYTES # BLD AUTO: 1.96 THOUSAND/UL (ref 0.5–4)
LYMPHOCYTES # BLD AUTO: 11 % (ref 25–45)
MCH RBC QN AUTO: 24.7 PG (ref 26–34)
MCHC RBC AUTO-ENTMCNC: 31.1 G/DL (ref 31–36)
MCV RBC AUTO: 80 FL (ref 80–100)
MICROCYTES BLD QL AUTO: PRESENT
MONOCYTES # BLD AUTO: 0.71 THOUSAND/UL (ref 0.2–0.9)
MONOCYTES NFR BLD AUTO: 4 % (ref 1–10)
NEUTS SEG # BLD: 15.13 THOUSAND/UL (ref 1.8–7.8)
NEUTS SEG NFR BLD AUTO: 85 %
NITRITE UR QL STRIP: NEGATIVE
NON-SQ EPI CELLS URNS QL MICRO: ABNORMAL /HPF
PH UR STRIP.AUTO: 5 [PH]
PLATELET # BLD AUTO: 314 THOUSANDS/UL (ref 150–450)
PLATELET BLD QL SMEAR: ADEQUATE
PMV BLD AUTO: 8.8 FL (ref 8.9–12.7)
POTASSIUM SERPL-SCNC: 5.5 MMOL/L (ref 3.6–5)
PROT SERPL-MCNC: 8.2 G/DL (ref 5.9–8.4)
PROT UR STRIP-MCNC: ABNORMAL MG/DL
RBC # BLD AUTO: 5.14 MILLION/UL (ref 4–5.2)
RBC #/AREA URNS AUTO: ABNORMAL /HPF
RBC MORPH BLD: PRESENT
SODIUM SERPL-SCNC: 139 MMOL/L (ref 137–147)
SP GR UR STRIP.AUTO: 1.02 (ref 1–1.04)
TOTAL CELLS COUNTED SPEC: 100
URINE COMMENT: ABNORMAL
UROBILINOGEN UA: 1 MG/DL
WBC # BLD AUTO: 17.8 THOUSAND/UL (ref 4.5–11)
WBC #/AREA URNS AUTO: ABNORMAL /HPF

## 2019-05-19 PROCEDURE — 81003 URINALYSIS AUTO W/O SCOPE: CPT | Performed by: PHYSICIAN ASSISTANT

## 2019-05-19 PROCEDURE — 87086 URINE CULTURE/COLONY COUNT: CPT | Performed by: PHYSICIAN ASSISTANT

## 2019-05-19 PROCEDURE — 99284 EMERGENCY DEPT VISIT MOD MDM: CPT | Performed by: PHYSICIAN ASSISTANT

## 2019-05-19 PROCEDURE — 83690 ASSAY OF LIPASE: CPT | Performed by: PHYSICIAN ASSISTANT

## 2019-05-19 PROCEDURE — 85007 BL SMEAR W/DIFF WBC COUNT: CPT | Performed by: PHYSICIAN ASSISTANT

## 2019-05-19 PROCEDURE — 96361 HYDRATE IV INFUSION ADD-ON: CPT

## 2019-05-19 PROCEDURE — 96375 TX/PRO/DX INJ NEW DRUG ADDON: CPT

## 2019-05-19 PROCEDURE — 96374 THER/PROPH/DIAG INJ IV PUSH: CPT

## 2019-05-19 PROCEDURE — 36415 COLL VENOUS BLD VENIPUNCTURE: CPT | Performed by: PHYSICIAN ASSISTANT

## 2019-05-19 PROCEDURE — 81001 URINALYSIS AUTO W/SCOPE: CPT | Performed by: PHYSICIAN ASSISTANT

## 2019-05-19 PROCEDURE — 99284 EMERGENCY DEPT VISIT MOD MDM: CPT

## 2019-05-19 PROCEDURE — 81025 URINE PREGNANCY TEST: CPT | Performed by: PHYSICIAN ASSISTANT

## 2019-05-19 PROCEDURE — 74177 CT ABD & PELVIS W/CONTRAST: CPT

## 2019-05-19 PROCEDURE — 80053 COMPREHEN METABOLIC PANEL: CPT | Performed by: PHYSICIAN ASSISTANT

## 2019-05-19 PROCEDURE — 85027 COMPLETE CBC AUTOMATED: CPT | Performed by: PHYSICIAN ASSISTANT

## 2019-05-19 RX ORDER — METOCLOPRAMIDE HYDROCHLORIDE 5 MG/ML
10 INJECTION INTRAMUSCULAR; INTRAVENOUS ONCE
Status: COMPLETED | OUTPATIENT
Start: 2019-05-19 | End: 2019-05-19

## 2019-05-19 RX ORDER — LEVOFLOXACIN 500 MG/1
500 TABLET, FILM COATED ORAL ONCE
Status: DISCONTINUED | OUTPATIENT
Start: 2019-05-19 | End: 2019-05-19 | Stop reason: HOSPADM

## 2019-05-19 RX ORDER — KETOROLAC TROMETHAMINE 30 MG/ML
15 INJECTION, SOLUTION INTRAMUSCULAR; INTRAVENOUS ONCE
Status: COMPLETED | OUTPATIENT
Start: 2019-05-19 | End: 2019-05-19

## 2019-05-19 RX ORDER — MAGNESIUM HYDROXIDE/ALUMINUM HYDROXICE/SIMETHICONE 120; 1200; 1200 MG/30ML; MG/30ML; MG/30ML
30 SUSPENSION ORAL EVERY 4 HOURS PRN
Status: DISCONTINUED | OUTPATIENT
Start: 2019-05-19 | End: 2019-05-19 | Stop reason: HOSPADM

## 2019-05-19 RX ORDER — ALUMINUM HYDROXIDE, MAGNESIUM HYDROXIDE, SIMETHICONE 400; 400; 40 MG/10ML; MG/10ML; MG/10ML
15 SUSPENSION ORAL
Qty: 355 ML | Refills: 0 | Status: SHIPPED | OUTPATIENT
Start: 2019-05-19

## 2019-05-19 RX ORDER — ONDANSETRON 2 MG/ML
4 INJECTION INTRAMUSCULAR; INTRAVENOUS ONCE
Status: COMPLETED | OUTPATIENT
Start: 2019-05-19 | End: 2019-05-19

## 2019-05-19 RX ORDER — DICYCLOMINE HCL 20 MG
20 TABLET ORAL 2 TIMES DAILY
Qty: 20 TABLET | Refills: 0 | Status: SHIPPED | OUTPATIENT
Start: 2019-05-19 | End: 2019-07-18

## 2019-05-19 RX ORDER — ONDANSETRON 4 MG/1
4 TABLET, ORALLY DISINTEGRATING ORAL EVERY 8 HOURS PRN
Qty: 10 TABLET | Refills: 0 | Status: SHIPPED | OUTPATIENT
Start: 2019-05-19 | End: 2019-10-10

## 2019-05-19 RX ORDER — LEVOFLOXACIN 500 MG/1
500 TABLET, FILM COATED ORAL DAILY
Qty: 10 TABLET | Refills: 0 | Status: SHIPPED | OUTPATIENT
Start: 2019-05-19 | End: 2019-05-29

## 2019-05-19 RX ORDER — TRAMADOL HYDROCHLORIDE 50 MG/1
50 TABLET ORAL ONCE
Status: DISCONTINUED | OUTPATIENT
Start: 2019-05-19 | End: 2019-05-19 | Stop reason: HOSPADM

## 2019-05-19 RX ORDER — DIPHENHYDRAMINE HYDROCHLORIDE 50 MG/ML
25 INJECTION INTRAMUSCULAR; INTRAVENOUS ONCE
Status: COMPLETED | OUTPATIENT
Start: 2019-05-19 | End: 2019-05-19

## 2019-05-19 RX ADMIN — METOCLOPRAMIDE 10 MG: 5 INJECTION, SOLUTION INTRAMUSCULAR; INTRAVENOUS at 15:50

## 2019-05-19 RX ADMIN — DIPHENHYDRAMINE HYDROCHLORIDE 25 MG: 50 INJECTION INTRAMUSCULAR; INTRAVENOUS at 15:50

## 2019-05-19 RX ADMIN — MORPHINE SULFATE 2 MG: 2 INJECTION, SOLUTION INTRAMUSCULAR; INTRAVENOUS at 15:50

## 2019-05-19 RX ADMIN — ONDANSETRON HYDROCHLORIDE 4 MG: 2 INJECTION, SOLUTION INTRAMUSCULAR; INTRAVENOUS at 15:01

## 2019-05-19 RX ADMIN — SODIUM CHLORIDE 1000 ML: 0.9 INJECTION, SOLUTION INTRAVENOUS at 15:01

## 2019-05-19 RX ADMIN — IOHEXOL 100 ML: 350 INJECTION, SOLUTION INTRAVENOUS at 16:09

## 2019-05-19 RX ADMIN — LIDOCAINE HYDROCHLORIDE 15 ML: 20 SOLUTION ORAL; TOPICAL at 17:29

## 2019-05-19 RX ADMIN — KETOROLAC TROMETHAMINE 15 MG: 30 INJECTION, SOLUTION INTRAMUSCULAR; INTRAVENOUS at 15:01

## 2019-05-19 RX ADMIN — ALUMINUM HYDROXIDE, MAGNESIUM HYDROXIDE, AND SIMETHICONE 30 ML: 200; 200; 20 SUSPENSION ORAL at 17:29

## 2019-05-21 LAB — BACTERIA UR CULT: NORMAL

## 2019-06-21 ENCOUNTER — HOSPITAL ENCOUNTER (EMERGENCY)
Facility: HOSPITAL | Age: 34
Discharge: LEFT BEFORE DISCHARGE | End: 2019-06-21
Attending: EMERGENCY MEDICINE | Admitting: EMERGENCY MEDICINE
Payer: COMMERCIAL

## 2019-06-21 VITALS
HEIGHT: 63 IN | WEIGHT: 240 LBS | HEART RATE: 68 BPM | TEMPERATURE: 98.1 F | OXYGEN SATURATION: 98 % | BODY MASS INDEX: 42.52 KG/M2 | SYSTOLIC BLOOD PRESSURE: 131 MMHG | RESPIRATION RATE: 20 BRPM | DIASTOLIC BLOOD PRESSURE: 80 MMHG

## 2019-06-21 DIAGNOSIS — R11.2 NAUSEA AND VOMITING: ICD-10-CM

## 2019-06-21 DIAGNOSIS — R10.13 EPIGASTRIC PAIN: Primary | ICD-10-CM

## 2019-06-21 LAB
ALBUMIN SERPL BCP-MCNC: 3.7 G/DL (ref 3.5–5)
ALP SERPL-CCNC: 93 U/L (ref 46–116)
ALT SERPL W P-5'-P-CCNC: 19 U/L (ref 12–78)
AMORPH URATE CRY URNS QL MICRO: ABNORMAL /HPF
ANION GAP SERPL CALCULATED.3IONS-SCNC: 6 MMOL/L (ref 4–13)
AST SERPL W P-5'-P-CCNC: 8 U/L (ref 5–45)
BACTERIA UR QL AUTO: ABNORMAL /HPF
BASOPHILS # BLD AUTO: 0.09 THOUSANDS/ΜL (ref 0–0.1)
BASOPHILS NFR BLD AUTO: 1 % (ref 0–1)
BILIRUB SERPL-MCNC: 0.31 MG/DL (ref 0.2–1)
BILIRUB UR QL STRIP: NEGATIVE
BUN SERPL-MCNC: 7 MG/DL (ref 5–25)
CALCIUM SERPL-MCNC: 8.9 MG/DL (ref 8.3–10.1)
CHLORIDE SERPL-SCNC: 108 MMOL/L (ref 100–108)
CLARITY UR: ABNORMAL
CO2 SERPL-SCNC: 23 MMOL/L (ref 21–32)
COLOR UR: YELLOW
COLOR, POC: YELLOW
CREAT SERPL-MCNC: 0.75 MG/DL (ref 0.6–1.3)
EOSINOPHIL # BLD AUTO: 0.19 THOUSAND/ΜL (ref 0–0.61)
EOSINOPHIL NFR BLD AUTO: 1 % (ref 0–6)
ERYTHROCYTE [DISTWIDTH] IN BLOOD BY AUTOMATED COUNT: 16.4 % (ref 11.6–15.1)
EXT PREG TEST URINE: NEGATIVE
EXT. CONTROL ED NAV: NORMAL
GFR SERPL CREATININE-BSD FRML MDRD: 104 ML/MIN/1.73SQ M
GLUCOSE SERPL-MCNC: 138 MG/DL (ref 65–140)
GLUCOSE UR STRIP-MCNC: NEGATIVE MG/DL
HBV SURFACE AG SER QL: NORMAL
HCT VFR BLD AUTO: 42.5 % (ref 34.8–46.1)
HCV AB SER QL: NORMAL
HGB BLD-MCNC: 12.9 G/DL (ref 11.5–15.4)
HGB UR QL STRIP.AUTO: ABNORMAL
HIV 1+2 AB+HIV1 P24 AG SERPL QL IA: NORMAL
HIV1 P24 AG SER QL: NORMAL
IMM GRANULOCYTES # BLD AUTO: 0.08 THOUSAND/UL (ref 0–0.2)
IMM GRANULOCYTES NFR BLD AUTO: 1 % (ref 0–2)
KETONES UR STRIP-MCNC: NEGATIVE MG/DL
LEUKOCYTE ESTERASE UR QL STRIP: ABNORMAL
LIPASE SERPL-CCNC: 71 U/L (ref 73–393)
LYMPHOCYTES # BLD AUTO: 2.89 THOUSANDS/ΜL (ref 0.6–4.47)
LYMPHOCYTES NFR BLD AUTO: 17 % (ref 14–44)
MCH RBC QN AUTO: 24.7 PG (ref 26.8–34.3)
MCHC RBC AUTO-ENTMCNC: 30.4 G/DL (ref 31.4–37.4)
MCV RBC AUTO: 81 FL (ref 82–98)
MONOCYTES # BLD AUTO: 0.57 THOUSAND/ΜL (ref 0.17–1.22)
MONOCYTES NFR BLD AUTO: 3 % (ref 4–12)
NEUTROPHILS # BLD AUTO: 12.76 THOUSANDS/ΜL (ref 1.85–7.62)
NEUTS SEG NFR BLD AUTO: 77 % (ref 43–75)
NITRITE UR QL STRIP: NEGATIVE
NON-SQ EPI CELLS URNS QL MICRO: ABNORMAL /HPF
NRBC BLD AUTO-RTO: 0 /100 WBCS
PH UR STRIP.AUTO: 5.5 [PH] (ref 4.5–8)
PLATELET # BLD AUTO: 362 THOUSANDS/UL (ref 149–390)
PMV BLD AUTO: 11 FL (ref 8.9–12.7)
POTASSIUM SERPL-SCNC: 4.3 MMOL/L (ref 3.5–5.3)
PROT SERPL-MCNC: 7.9 G/DL (ref 6.4–8.2)
PROT UR STRIP-MCNC: ABNORMAL MG/DL
RBC # BLD AUTO: 5.22 MILLION/UL (ref 3.81–5.12)
RBC #/AREA URNS AUTO: ABNORMAL /HPF
SODIUM SERPL-SCNC: 137 MMOL/L (ref 136–145)
SP GR UR STRIP.AUTO: >=1.03 (ref 1–1.03)
UROBILINOGEN UR QL STRIP.AUTO: 0.2 E.U./DL
WBC # BLD AUTO: 16.58 THOUSAND/UL (ref 4.31–10.16)
WBC #/AREA URNS AUTO: ABNORMAL /HPF

## 2019-06-21 PROCEDURE — 80053 COMPREHEN METABOLIC PANEL: CPT | Performed by: EMERGENCY MEDICINE

## 2019-06-21 PROCEDURE — 99284 EMERGENCY DEPT VISIT MOD MDM: CPT

## 2019-06-21 PROCEDURE — 99284 EMERGENCY DEPT VISIT MOD MDM: CPT | Performed by: EMERGENCY MEDICINE

## 2019-06-21 PROCEDURE — 83690 ASSAY OF LIPASE: CPT | Performed by: EMERGENCY MEDICINE

## 2019-06-21 PROCEDURE — 96372 THER/PROPH/DIAG INJ SC/IM: CPT

## 2019-06-21 PROCEDURE — 86803 HEPATITIS C AB TEST: CPT | Performed by: EMERGENCY MEDICINE

## 2019-06-21 PROCEDURE — 96361 HYDRATE IV INFUSION ADD-ON: CPT

## 2019-06-21 PROCEDURE — 81025 URINE PREGNANCY TEST: CPT | Performed by: EMERGENCY MEDICINE

## 2019-06-21 PROCEDURE — 81001 URINALYSIS AUTO W/SCOPE: CPT

## 2019-06-21 PROCEDURE — 87806 HIV AG W/HIV1&2 ANTB W/OPTIC: CPT | Performed by: EMERGENCY MEDICINE

## 2019-06-21 PROCEDURE — 96374 THER/PROPH/DIAG INJ IV PUSH: CPT

## 2019-06-21 PROCEDURE — 85025 COMPLETE CBC W/AUTO DIFF WBC: CPT | Performed by: EMERGENCY MEDICINE

## 2019-06-21 PROCEDURE — 36415 COLL VENOUS BLD VENIPUNCTURE: CPT | Performed by: EMERGENCY MEDICINE

## 2019-06-21 PROCEDURE — 87340 HEPATITIS B SURFACE AG IA: CPT | Performed by: EMERGENCY MEDICINE

## 2019-06-21 RX ORDER — ONDANSETRON 2 MG/ML
INJECTION INTRAMUSCULAR; INTRAVENOUS
Status: COMPLETED
Start: 2019-06-21 | End: 2019-06-21

## 2019-06-21 RX ORDER — KETOROLAC TROMETHAMINE 30 MG/ML
15 INJECTION, SOLUTION INTRAMUSCULAR; INTRAVENOUS ONCE
Status: COMPLETED | OUTPATIENT
Start: 2019-06-21 | End: 2019-06-21

## 2019-06-21 RX ORDER — HALOPERIDOL 5 MG/ML
2 INJECTION INTRAMUSCULAR ONCE
Status: COMPLETED | OUTPATIENT
Start: 2019-06-21 | End: 2019-06-21

## 2019-06-21 RX ADMIN — KETOROLAC TROMETHAMINE 15 MG: 30 INJECTION, SOLUTION INTRAMUSCULAR at 12:58

## 2019-06-21 RX ADMIN — SODIUM CHLORIDE 1000 ML: 0.9 INJECTION, SOLUTION INTRAVENOUS at 13:23

## 2019-06-21 RX ADMIN — ONDANSETRON 4 MG: 2 INJECTION INTRAMUSCULAR; INTRAVENOUS at 12:38

## 2019-06-21 RX ADMIN — HALOPERIDOL LACTATE 2 MG: 5 INJECTION INTRAMUSCULAR at 12:58

## 2019-06-21 NOTE — ED NOTES
Pt demanding to leave AMA, Dr Hughes Ripa aware and will sign her out RADHIKA Brown, RN  06/21/19 5684
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
## 2019-06-21 NOTE — ED ATTENDING ATTESTATION
Philippe Rao MD, saw and evaluated the patient  All available labs and X-rays were ordered by me or the resident and have been reviewed by myself  I discussed the patient with the resident / non-physician and agree with the resident's / non-physician practitioner's findings and plan as documented in the resident's / non-physician practicitioner's note, except where noted  At this point, I agree with the current assessment done in the ED  I was present during key portions of all procedures performed unless otherwise stated  Chief Complaint   Patient presents with    Abdominal Pain     Pt " I have had this pain for the past two days " Pt crying and screaming in pain  Pt begging to take a hot shower  Pt c/o n/v, diarrhea and difficulty keeping PO down     This is a 28 y/o F presenting for acute on chronic abdominal pain  The patient states taht she has been having severe belly pain for a couple days, unable to eat/drink  She denies f/ch/cp/sob  Denies new foods, unusual foods  Denies any urinary tract infection symptoms (burning, itching, pain, blood, frequency)  Denies any upper respiratory tract infection symptoms (cough, congestion, rhinorrhea, sore throat)  She says that she does feel little bit cold  She states that she has had diarrhea that is nonbloody  No sick contacts with similar  No urinary tract infection symptoms  No vaginal symptoms  Last menstrual cycle was a month ago  PMH:  - anxiety  - bipolar  - dm  PSH:  - None  Denies alcohol  +smoking daily  +marijuana  PE:  Vitals:    06/21/19 1227 06/21/19 1239 06/21/19 1300   BP: 151/79  131/80   BP Location: Left arm  Left arm   Pulse: 73  68   Resp: 22  20   Temp:  98 1 °F (36 7 °C)    TempSrc:  Oral    SpO2: 97%  98%   Weight: 109 kg (240 lb)     Height: 5' 3" (1 6 m)     General: VSS, NAD, awake, alert  Well-nourished, well-developed  Appears stated age  Speaking normally in full sentences     Head: Normocephalic, atraumatic, nontender  Eyes: PERRL, EOM-I  No diplopia  No hyphema  No subconjunctival hemorrhages  Symmetrical lids  ENT: Atraumatic external nose and ears  MMM  No malocclusion  No stridor  Normal phonation  No drooling  Normal swallowing  Neck: Symmetric, trachea midline  No JVD  CV: RRR  +S1/S2  No murmurs or gallops  Peripheral pulses +2 throughout  No chest wall tenderness  Lungs:   Unlabored No retractions  CTAB, lungs sounds equal bilateral    No tachypnea  Abd: +BS, soft, diffuse belly tenderness  Heel strike causes no pain  Using leg to press into abdomen causes no pain  No psoas sign  MSK:   FROM   Back:   No rashes  Skin: Dry, intact  Neuro: AAOx3, GCS 15, CN II-XII grossly intact  Motor grossly intact  Psychiatric/Behavioral: Appropriate mood and affect   Exam: deferred  A:  - Belly pain  P:  - labs  - urine  - pain control  - discuss imaging  - 13 point ROS was performed and all are normal unless stated in the history above  - Nursing note reviewed  Vitals reviewed  - Orders placed by myself and/or advanced practitioner / resident     - Previous chart was reviewed  - No language barrier    - History obtained from patient  - There are no limitations to the history obtained  - Critical care time: Not applicable for this patient  Final Diagnosis:  1  Epigastric pain    2   Nausea and vomiting        ED Course as of Jun 24 1740 Fri Jun 21, 2019   1310 Leukocytes, UA(!): Trace   1310 Nitrite, UA: Negative   1310 SL AMB SPECIFIC GRAVITY_URINE: >=1 030   1310 PREGNANCY TEST URINE: negative   1343 WBC(!): 16 58   1343 Hemoglobin: 12 9   1343 Platelet Count: 690   1343 Blood Pressure: 151/79     Medications   ondansetron (ZOFRAN) 4 mg/2 mL injection **ADS Override Pull** (4 mg  Given 6/21/19 1238)   haloperidol lactate (HALDOL) injection 2 mg (2 mg Intramuscular Given 6/21/19 1258)   ketorolac (TORADOL) injection 15 mg (15 mg Intravenous Given 6/21/19 1258)   sodium chloride 0 9 % bolus 1,000 mL (0 mL Intravenous Stopped 6/21/19 3460)     No orders to display     Orders Placed This Encounter   Procedures    Comprehensive metabolic panel    Lipase    CBC and differential    Hepatitis B surface antigen    Hepatitis C antibody    Rapid HIV 1/2 AB-AG Combo    Urine Microscopic    POCT urinalysis dipstick    POCT pregnancy, urine     Labs Reviewed   LIPASE - Abnormal       Result Value Ref Range Status    Lipase 71 (*) 73 - 393 u/L Final   CBC AND DIFFERENTIAL - Abnormal    WBC 16 58 (*) 4 31 - 10 16 Thousand/uL Final    RBC 5 22 (*) 3 81 - 5 12 Million/uL Final    Hemoglobin 12 9  11 5 - 15 4 g/dL Final    Hematocrit 42 5  34 8 - 46 1 % Final    MCV 81 (*) 82 - 98 fL Final    MCH 24 7 (*) 26 8 - 34 3 pg Final    MCHC 30 4 (*) 31 4 - 37 4 g/dL Final    RDW 16 4 (*) 11 6 - 15 1 % Final    MPV 11 0  8 9 - 12 7 fL Final    Platelets 728  630 - 390 Thousands/uL Final    nRBC 0  /100 WBCs Final    Neutrophils Relative 77 (*) 43 - 75 % Final    Immat GRANS % 1  0 - 2 % Final    Lymphocytes Relative 17  14 - 44 % Final    Monocytes Relative 3 (*) 4 - 12 % Final    Eosinophils Relative 1  0 - 6 % Final    Basophils Relative 1  0 - 1 % Final    Neutrophils Absolute 12 76 (*) 1 85 - 7 62 Thousands/µL Final    Immature Grans Absolute 0 08  0 00 - 0 20 Thousand/uL Final    Lymphocytes Absolute 2 89  0 60 - 4 47 Thousands/µL Final    Monocytes Absolute 0 57  0 17 - 1 22 Thousand/µL Final    Eosinophils Absolute 0 19  0 00 - 0 61 Thousand/µL Final    Basophils Absolute 0 09  0 00 - 0 10 Thousands/µL Final   URINE MICROSCOPIC - Abnormal    RBC, UA 2-4 (*) None Seen, 0-5 /hpf Final    WBC, UA 2-4 (*) None Seen, 0-5, 5-55, 5-65 /hpf Final    Epithelial Cells Occasional  None Seen, Occasional /hpf Final    Bacteria, UA Occasional  None Seen, Occasional /hpf Final    AMORPH URATES Innumerable  /hpf Final   ED URINE MACROSCOPIC - Abnormal    Color, UA Yellow   Final    Clarity, UA Cloudy   Final    pH, UA 5 5  4 5 - 8 0 Final    Leukocytes, UA Trace (*) Negative Final    Nitrite, UA Negative  Negative Final    Protein, UA Trace (*) Negative mg/dl Final    Glucose, UA Negative  Negative mg/dl Final    Ketones, UA Negative  Negative mg/dl Final    Urobilinogen, UA 0 2  0 2, 1 0 E U /dl E U /dl Final    Bilirubin, UA Negative  Negative Final    Blood, UA Moderate (*) Negative Final    Specific Gravity, UA >=1 030  1 003 - 1 030 Final    Narrative:     CLINITEK RESULT   HEPATITIS B SURFACE ANTIGEN - Normal    Hepatitis B Surface Ag Non-reactive  Non-reactive, NonReactive - Confirmed Final   HEPATITIS C ANTIBODY - Normal    Hepatitis C Ab Non-reactive  Non-reactive Final   RAPID HIV 1/2 AB-AG COMBO - Normal    Rapid HIV 1 AND 2 Non-Reactive  Non-Reactive Final    HIV-1 P24 Ag Screen Non-Reactive  Non-Reactive Final    Narrative:     Negative for HIV-1 p24 Antigen  Negative for HIV-1 and/or HIV-2 Antibody  POCT URINALYSIS DIPSTICK - Normal    Color, UA yellow   Final   POCT PREGNANCY, URINE - Normal    EXT PREG TEST UR (Ref: Negative) negative   Final    Control valid   Final   COMPREHENSIVE METABOLIC PANEL    Sodium 881  136 - 145 mmol/L Final    Potassium 4 3  3 5 - 5 3 mmol/L Final    Chloride 108  100 - 108 mmol/L Final    CO2 23  21 - 32 mmol/L Final    ANION GAP 6  4 - 13 mmol/L Final    BUN 7  5 - 25 mg/dL Final    Creatinine 0 75  0 60 - 1 30 mg/dL Final    Comment: Standardized to IDMS reference method    Glucose 138  65 - 140 mg/dL Final    Comment:   If the patient is fasting, the ADA then defines impaired fasting glucose as > 100 mg/dL and diabetes as > or equal to 123 mg/dL  Specimen collection should occur prior to Sulfasalazine administration due to the potential for falsely depressed results  Specimen collection should occur prior to Sulfapyridine administration due to the potential for falsely elevated results      Calcium 8 9  8 3 - 10 1 mg/dL Final    AST 8  5 - 45 U/L Final    Comment:   Specimen collection should occur prior to Sulfasalazine administration due to the potential for falsely depressed results  ALT 19  12 - 78 U/L Final    Comment:   Specimen collection should occur prior to Sulfasalazine and/or Sulfapyridine administration due to the potential for falsely depressed results  Alkaline Phosphatase 93  46 - 116 U/L Final    Total Protein 7 9  6 4 - 8 2 g/dL Final    Albumin 3 7  3 5 - 5 0 g/dL Final    Total Bilirubin 0 31  0 20 - 1 00 mg/dL Final    eGFR 104  ml/min/1 73sq m Final    Narrative:     [place] guidelines for Chronic Kidney Disease (CKD):     Stage 1 with normal or high GFR (GFR > 90 mL/min/1 73 square meters)    Stage 2 Mild CKD (GFR = 60-89 mL/min/1 73 square meters)    Stage 3A Moderate CKD (GFR = 45-59 mL/min/1 73 square meters)    Stage 3B Moderate CKD (GFR = 30-44 mL/min/1 73 square meters)    Stage 4 Severe CKD (GFR = 15-29 mL/min/1 73 square meters)    Stage 5 End Stage CKD (GFR <15 mL/min/1 73 square meters)  Note: GFR calculation is accurate only with a steady state creatinine   EXPOSURE PANEL - SOURCE PATIENT    Narrative: The following orders were created for panel order Exposure panel - source patient  Procedure                               Abnormality         Status                     ---------                               -----------         ------                     Hepatitis B surface antigen[[id number]]  Normal              Final result               Hepatitis C antibody[[id number]]         Normal              Final result               Rapid HIV 1/2 AB-AG Combo[[id number]]    Normal              Final result                 Please view results for these tests on the individual orders       Time reflects when diagnosis was documented in both MDM as applicable and the Disposition within this note     Time User Action Codes Description Comment    6/21/2019  6:57 PM Adalid Paul Add [R10 13] Epigastric pain     6/21/2019 6:57 PM Keanu Maceywali Add [R11 2] Nausea and vomiting       ED Disposition     ED Disposition Condition Date/Time Comment    [facility]  Fri Jun 21, 2019  2:26 PM Date: 6/21/2019  Patient: Ramos Anguiano  Admitted: 6/21/2019 12:20 PM  Attending Provider: Alvaro Arroyo MD    Ramos Anguiano or her authorized caregiver has made the decision for the patient to leave the emergency department again st the advice of Dr La Abdi  She or her authorized caregiver has been informed and understands the inherent risks, including death  She or her authorized caregiver has decided to accept the responsibility for this decision  Ruben Mann and sarah concepcion parties have been advised that she may return for further evaluation or treatment  Her condition at time of discharge was stable    Ramos Anguiano had current vital signs as follows:  /80 (BP Location: Left arm)   Pulse 68   Te mp 98 1 °F (36 7 °C) (Oral)   Resp 20   Ht 5' 3" (1 6 m)   Wt 109 kg (240 lb)   LMP 05/27/2019         Follow-up Information     Follow up With Specialties Details Why Contact Info Additional [address] Emergency Department Emergency Medicine Go to  As needed, If symptoms worsen [address]  [phone]  ED, [address] [place] [address], [id number]        Discharge Medication List as of 6/21/2019  2:27 PM      CONTINUE these medications which have NOT CHANGED    Details   ALPRAZolam (XANAX) 1 mg tablet Take by mouth daily at bedtime as needed for anxiety, Historical Med      buPROPion (WELLBUTRIN SR) 200 MG 12 hr tablet Take 200 mg by mouth daily, Historical Med      escitalopram (LEXAPRO) 20 mg tablet Take 5 mg by mouth daily, Historical Med      QUEtiapine Fumarate (SEROQUEL PO) Take 300 mg by mouth daily at bedtime  , Historical Med      TRAZODONE HCL PO Take 150 mg by mouth daily at bedtime  , Historical Med      acetaminophen (TYLENOL) 500 mg tablet Take 1 tablet (500 mg total) by mouth every 6 (six) hours as needed for mild pain, moderate pain, headaches or fever, Starting Sat 10/13/2018, Normal      aluminum-magnesium hydroxide-simethicone (MAALOX) 200-200-20 MG/5ML SUSP Take 15 mL by mouth 4 (four) times a day (before meals and at bedtime), Starting Sun 5/19/2019, Print      dicyclomine (BENTYL) 20 mg tablet Take 1 tablet (20 mg total) by mouth 2 (two) times a day, Starting Sun 5/19/2019, Print      fluticasone (FLONASE) 50 mcg/act nasal spray 1 spray into each nostril daily, Starting Fri 1/4/2019, Print      ibuprofen (MOTRIN) 400 mg tablet Take 1 tablet (400 mg total) by mouth every 6 (six) hours as needed for mild pain, moderate pain or fever, Starting Sat 10/13/2018, Normal      ondansetron (ZOFRAN) 4 mg tablet Take 1 tablet (4 mg total) by mouth every 6 (six) hours, Starting Fri 1/4/2019, Print      ondansetron (ZOFRAN-ODT) 4 mg disintegrating tablet Take 1 tablet (4 mg total) by mouth every 8 (eight) hours as needed for nausea or vomiting for up to 10 days, Starting Sun 5/19/2019, Until Wed 5/29/2019, Print           No discharge procedures on file  Prior to Admission Medications   Prescriptions Last Dose Informant Patient Reported? Taking?    ALPRAZolam (XANAX) 1 mg tablet 6/20/2019 at Unknown time  Yes Yes   Sig: Take by mouth daily at bedtime as needed for anxiety   QUEtiapine Fumarate (SEROQUEL PO) 6/20/2019 at Unknown time Self Yes Yes   Sig: Take 300 mg by mouth daily at bedtime     TRAZODONE HCL PO 6/20/2019 at Unknown time Self Yes Yes   Sig: Take 150 mg by mouth daily at bedtime     acetaminophen (TYLENOL) 500 mg tablet   No No   Sig: Take 1 tablet (500 mg total) by mouth every 6 (six) hours as needed for mild pain, moderate pain, headaches or fever   aluminum-magnesium hydroxide-simethicone (MAALOX) 200-200-20 MG/5ML SUSP   No No   Sig: Take 15 mL by mouth 4 (four) times a day (before meals and at bedtime)   buPROPion [facility] SR) 200 MG 12 hr tablet 2019 at Unknown time  Yes Yes   Sig: Take 200 mg by mouth daily   dicyclomine (BENTYL) 20 mg tablet   No No   Sig: Take 1 tablet (20 mg total) by mouth 2 (two) times a day   escitalopram (LEXAPRO) 20 mg tablet 2019 at Unknown time  Yes Yes   Sig: Take 5 mg by mouth daily   fluticasone (FLONASE) 50 mcg/act nasal spray   No No   Si spray into each nostril daily   ibuprofen (MOTRIN) 400 mg tablet   No No   Sig: Take 1 tablet (400 mg total) by mouth every 6 (six) hours as needed for mild pain, moderate pain or fever   ondansetron (ZOFRAN) 4 mg tablet   No No   Sig: Take 1 tablet (4 mg total) by mouth every 6 (six) hours   ondansetron (ZOFRAN-ODT) 4 mg disintegrating tablet   No No   Sig: Take 1 tablet (4 mg total) by mouth every 8 (eight) hours as needed for nausea or vomiting for up to 10 days      Facility-Administered Medications: None       Portions of the record may have been created with voice recognition software  Occasional wrong word or "sound a like" substitutions may have occurred due to the inherent limitations of voice recognition software  Read the chart carefully and recognize, using context, where substitutions have occurred      Electronically signed by:  Arvin Ashton

## 2019-06-21 NOTE — ED PROVIDER NOTES
History  Chief Complaint   Patient presents with    Abdominal Pain     Pt " I have had this pain for the past two days " Pt crying and screaming in pain  Pt begging to take a hot shower  Pt c/o n/v, diarrhea and difficulty keeping PO down     Patient is a 26-year-old female with a past medical history of diabetes, bipolar disorder and anxiety who presents to the emergency department for epigastric abdominal pain for 2 days  She said it has been constant, however waxes and wanes in intensity, nonradiating  Associated with nausea and vomiting, does not know the exact number, a time she has vomited  She also endorses diarrhea 2-3 episodes without blood or mucus  Nonbloody and non bilious vomitus  She has had no fever chills, no urinary symptoms, no vaginal bleeding or discharge  Last menstrual period less than 1 month ago, she states that she is not sexually active and has not been sexually active for 2-3 months  Denies drug use, denies alcohol, no surgical history  Patient has been to the emergency department before for epigastric pain, has not followed up with a primary care physician or Gastroenterology  Prior to entering room patient is calm and appears comfortable, then began screaming in pain and punching her stomach in the epigastric region  Patient given Toradol and hospital for abdominal pain, Zofran for nausea, will give 1 L IV fluids  Patient was re-evaluated and she was sleeping comfortably in the bed  Went back to re-evaluate the patient a 3rd time and she began screaming in pain again, and demanded morphine for her pain  Instructed patient that I would give more medication for analgesia however, would not be giving significant amounts of narcotics at this time, which gives small dose of narcotic in the emergency department  Patient's mother is here with her and would like a CT scan of the abdomen  After ordering the CT scan, patient left AMA prior to CT being completed        History provided by:  Patient and medical records   used: No    Abdominal Pain   Pain location:  Epigastric  Pain quality: cramping    Pain radiates to:  Does not radiate  Onset quality:  Gradual  Timing:  Constant  Progression:  Waxing and waning  Chronicity:  Recurrent  Context: not alcohol use, not previous surgeries, not recent illness, not recent sexual activity, not sick contacts and not trauma    Relieved by:  Nothing  Worsened by:  Nothing  Ineffective treatments:  None tried  Associated symptoms: diarrhea    Associated symptoms: no chest pain, no chills, no constipation, no cough, no dysuria, no fever, no nausea, no shortness of breath, no vaginal bleeding, no vaginal discharge and no vomiting        Prior to Admission Medications   Prescriptions Last Dose Informant Patient Reported? Taking?    ALPRAZolam (XANAX) 1 mg tablet 2019 at Unknown time  Yes Yes   Sig: Take by mouth daily at bedtime as needed for anxiety   QUEtiapine Fumarate (SEROQUEL PO) 2019 at Unknown time Self Yes Yes   Sig: Take 300 mg by mouth daily at bedtime     TRAZODONE HCL PO 2019 at Unknown time Self Yes Yes   Sig: Take 150 mg by mouth daily at bedtime     acetaminophen (TYLENOL) 500 mg tablet   No No   Sig: Take 1 tablet (500 mg total) by mouth every 6 (six) hours as needed for mild pain, moderate pain, headaches or fever   aluminum-magnesium hydroxide-simethicone (MAALOX) 200-200-20 MG/5ML SUSP   No No   Sig: Take 15 mL by mouth 4 (four) times a day (before meals and at bedtime)   buPROPion (WELLBUTRIN SR) 200 MG 12 hr tablet 2019 at Unknown time  Yes Yes   Sig: Take 200 mg by mouth daily   dicyclomine (BENTYL) 20 mg tablet   No No   Sig: Take 1 tablet (20 mg total) by mouth 2 (two) times a day   escitalopram (LEXAPRO) 20 mg tablet 2019 at Unknown time  Yes Yes   Sig: Take 5 mg by mouth daily   fluticasone (FLONASE) 50 mcg/act nasal spray   No No   Si spray into each nostril daily   ibuprofen (MOTRIN) 400 mg tablet   No No   Sig: Take 1 tablet (400 mg total) by mouth every 6 (six) hours as needed for mild pain, moderate pain or fever   ondansetron (ZOFRAN) 4 mg tablet   No No   Sig: Take 1 tablet (4 mg total) by mouth every 6 (six) hours   ondansetron (ZOFRAN-ODT) 4 mg disintegrating tablet   No No   Sig: Take 1 tablet (4 mg total) by mouth every 8 (eight) hours as needed for nausea or vomiting for up to 10 days      Facility-Administered Medications: None       Past Medical History:   Diagnosis Date    Anxiety     Bipolar disease, chronic ([facility] [address] )     Diabetes mellitus ([facility] )        History reviewed  No pertinent surgical history  History reviewed  No pertinent family history  I have reviewed and agree with the history as documented  Social History     Tobacco Use    Smoking status: Current Every Day Smoker     Packs/day: 0 50    Smokeless tobacco: Never Used   Substance Use Topics    Alcohol use: Never     Frequency: Never    Drug use: Yes     Types: Marijuana     Comment: last used: 06/20/19        Review of Systems   Constitutional: Negative  Negative for chills and fever  HENT: Negative  Eyes: Negative  Respiratory: Negative  Negative for cough and shortness of breath  Cardiovascular: Negative  Negative for chest pain  Gastrointestinal: Positive for abdominal pain and diarrhea  Negative for blood in stool, constipation, nausea and vomiting  Endocrine: Negative  Genitourinary: Negative  Negative for dysuria, flank pain, frequency, urgency, vaginal bleeding and vaginal discharge  Musculoskeletal: Negative  Negative for back pain  Skin: Negative  Allergic/Immunologic: Negative  Neurological: Negative  Negative for syncope, weakness, light-headedness and headaches  Hematological: Negative  Psychiatric/Behavioral: Negative          Physical Exam  ED Triage Vitals   Temperature Pulse Respirations Blood Pressure SpO2   06/21/19 1239 06/21/19 1227 06/21/19 1227 06/21/19 1227 06/21/19 1227   98 1 °F (36 7 °C) 73 22 151/79 97 %      Temp Source Heart Rate Source Patient Position - Orthostatic VS BP Location FiO2 (%)   06/21/19 1239 06/21/19 1227 06/21/19 1227 06/21/19 1227 --   Oral Monitor Sitting Left arm       Pain Score       06/21/19 1227       Worst Possible Pain             Orthostatic Vital Signs  Vitals:    06/21/19 1227 06/21/19 1300   BP: 151/79 131/80   Pulse: 73 68   Patient Position - Orthostatic VS: Sitting Sitting       Physical Exam   Constitutional: She is oriented to person, place, and time  She appears well-developed and well-nourished  HENT:   Head: Normocephalic and atraumatic  Right Ear: External ear normal    Left Ear: External ear normal    Nose: Nose normal    Mouth/Throat: Oropharynx is clear and moist  No oropharyngeal exudate  Eyes: Pupils are equal, round, and reactive to light  Conjunctivae and EOM are normal  No scleral icterus  Neck: Normal range of motion  Neck supple  No JVD present  No tracheal deviation present  Cardiovascular: Normal rate, regular rhythm, normal heart sounds and intact distal pulses  No murmur heard  Pulmonary/Chest: Effort normal and breath sounds normal    Abdominal: Soft  Normal appearance and bowel sounds are normal  She exhibits no distension  There is no hepatosplenomegaly  There is tenderness in the epigastric area  Musculoskeletal: Normal range of motion  She exhibits no edema, tenderness or deformity  Neurological: She is alert and oriented to person, place, and time  She exhibits normal muscle tone  Skin: Skin is warm and dry  Psychiatric: Her behavior is normal  Judgment and thought content normal  Her affect is angry  Cognition and memory are normal    Vitals reviewed        ED Medications  Medications   ondansetron (ZOFRAN) 4 mg/2 mL injection **ADS Override Pull** (4 mg  Given 6/21/19 1238)   haloperidol lactate (HALDOL) injection 2 mg (2 mg Intramuscular Given 6/21/19 1258) ketorolac (TORADOL) injection 15 mg (15 mg Intravenous Given 6/21/19 1258)   sodium chloride 0 9 % bolus 1,000 mL (0 mL Intravenous Stopped 6/21/19 1409)       Diagnostic Studies  Results Reviewed     Procedure Component Value Units Date/Time    Hepatitis B surface antigen [[id number]]  (Normal) Collected:  06/21/19 1258    Lab Status:  Final result Specimen:  Blood from Arm, Right Updated:  06/21/19 1427     Hepatitis B Surface Ag Non-reactive    Hepatitis C antibody [[id number]]  (Normal) Collected:  06/21/19 1258    Lab Status:  Final result Specimen:  Blood from Arm, Right Updated:  06/21/19 1427     Hepatitis C Ab Non-reactive    Urine Microscopic [[id number]]  (Abnormal) Collected:  06/21/19 1301    Lab Status:  Final result Specimen:  Urine, Clean Catch Updated:  06/21/19 1354     RBC, UA 2-4 /hpf      WBC, UA 2-4 /hpf      Epithelial Cells Occasional /hpf      Bacteria, UA Occasional /hpf      AMORPH URATES Innumerable /hpf     Rapid HIV 1/2 AB-AG Combo [[id number]]  (Normal) Collected:  06/21/19 1258    Lab Status:  Final result Specimen:  Blood from Arm, Right Updated:  06/21/19 1349     Rapid HIV 1 AND 2 Non-Reactive     HIV-1 P24 Ag Screen Non-Reactive    Narrative:       Negative for HIV-1 p24 Antigen  Negative for HIV-1 and/or HIV-2 Antibody      Lipase [[id number]]  (Abnormal) Collected:  06/21/19 1258    Lab Status:  Final result Specimen:  Blood from Arm, Right Updated:  06/21/19 1333     Lipase 71 u/L     Comprehensive metabolic panel [[id number]] Collected:  06/21/19 1258    Lab Status:  Final result Specimen:  Blood from Arm, Right Updated:  06/21/19 1333     Sodium 137 mmol/L      Potassium 4 3 mmol/L      Chloride 108 mmol/L      CO2 23 mmol/L      ANION GAP 6 mmol/L      BUN 7 mg/dL      Creatinine 0 75 mg/dL      Glucose 138 mg/dL      Calcium 8 9 mg/dL      AST 8 U/L      ALT 19 U/L      Alkaline Phosphatase 93 U/L      Total Protein 7 9 g/dL      Albumin 3 7 g/dL      Total Bilirubin 0 31 mg/dL eGFR 104 ml/min/1 73sq m     Narrative:       [place] guidelines for Chronic Kidney Disease (CKD):     Stage 1 with normal or high GFR (GFR > 90 mL/min/1 73 square meters)    Stage 2 Mild CKD (GFR = 60-89 mL/min/1 73 square meters)    Stage 3A Moderate CKD (GFR = 45-59 mL/min/1 73 square meters)    Stage 3B Moderate CKD (GFR = 30-44 mL/min/1 73 square meters)    Stage 4 Severe CKD (GFR = 15-29 mL/min/1 73 square meters)    Stage 5 End Stage CKD (GFR <15 mL/min/1 73 square meters)  Note: GFR calculation is accurate only with a steady state creatinine    CBC and differential [[id number]]  (Abnormal) Collected:  06/21/19 1258    Lab Status:  Final result Specimen:  Blood from Arm, Right Updated:  06/21/19 1316     WBC 16 58 Thousand/uL      RBC 5 22 Million/uL      Hemoglobin 12 9 g/dL      Hematocrit 42 5 %      MCV 81 fL      MCH 24 7 pg      MCHC 30 4 g/dL      RDW 16 4 %      MPV 11 0 fL      Platelets 118 Thousands/uL      nRBC 0 /100 WBCs      Neutrophils Relative 77 %      Immat GRANS % 1 %      Lymphocytes Relative 17 %      Monocytes Relative 3 %      Eosinophils Relative 1 %      Basophils Relative 1 %      Neutrophils Absolute 12 76 Thousands/µL      Immature Grans Absolute 0 08 Thousand/uL      Lymphocytes Absolute 2 89 Thousands/µL      Monocytes Absolute 0 57 Thousand/µL      Eosinophils Absolute 0 19 Thousand/µL      Basophils Absolute 0 09 Thousands/µL     POCT urinalysis dipstick [[id number]]  (Normal) Resulted:  06/21/19 1259    Lab Status:  Final result Updated:  06/21/19 1259     Color, UA yellow    POCT pregnancy, urine [[id number]]  (Normal) Resulted:  06/21/19 1258    Lab Status:  Final result Updated:  06/21/19 1258     EXT PREG TEST UR (Ref: Negative) negative     Control valid    ED Urine Macroscopic [[id number]]  (Abnormal) Collected:  06/21/19 1301    Lab Status:  Final result Specimen:  Urine Updated:  06/21/19 1257     Color, UA Yellow     Clarity, UA Cloudy     pH, UA 5 5     Leukocytes, UA Trace     Nitrite, UA Negative     Protein, UA Trace mg/dl      Glucose, UA Negative mg/dl      Ketones, UA Negative mg/dl      Urobilinogen, UA 0 2 E U /dl      Bilirubin, UA Negative     Blood, UA Moderate     Specific Gravity, UA >=1 030    Narrative:       CLINITEK RESULT                 No orders to display         Procedures  Procedures        ED Course  ED Course as of Jun 21 1910 Fri Jun 21, 2019   1310 Patient reevaluated  Sleeping comfortably in bed       1354 Patient reevaluated  Began punching her stomach and demanding morphine  Mother [facility] abdomen  [facility] would not be giving large amounts of morphine, but would control her pain with small dose of narcotics and other analgesics      1428 Patient wants         03 17 [phone] 53 patient left ER AMA prior to CT abd pelvis completion  MDM  Number of Diagnoses or Management Options  Epigastric pain: new and requires workup  Nausea and vomiting: new and requires workup  Diagnosis management comments: 1  Abdominal pain, workup with labs including CMP, lipase and CBC  IV fluids, Zofran and pain control with Toradol and Haldol  2  Patient demanded morphine, and then left AMA prior to her CT scan being completed           Amount and/or Complexity of Data Reviewed  Clinical lab tests: ordered and reviewed  Tests in the radiology section of CPT®: ordered  Review and summarize past medical records: yes        Disposition  Final diagnoses:   Epigastric pain   Nausea and vomiting     Time reflects when diagnosis was documented in both MDM as applicable and the Disposition within this note     Time User Action Codes Description Comment    6/21/2019  6:57 PM Le Amabile Add [R10 13] Epigastric pain     6/21/2019  6:57 PM Le Amabile Add [R11 2] Nausea and vomiting       ED Disposition     ED Disposition Condition Date/Time Comment    [place]  Fri Jun 21, 2019  2:26 PM Date: 6/21/2019  Patient: Peter Hurst  Admitted: 6/21/2019 12:20 PM  Attending Provider: Gabbie Hsu MD    Peter Hurst or her authorized caregiver has made the decision for the patient to leave the emergency department again st the advice of Dr Eugena Claude  She or her authorized caregiver has been informed and understands the inherent risks, including death  She or her authorized caregiver has decided to accept the responsibility for this decision  Ruben Mann and sarah stephens necessary parties have been advised that she may return for further evaluation or treatment  Her condition at time of discharge was stable    Peter Hurst had current vital signs as follows:  /80 (BP Location: Left arm)   Pulse 68   Te mp 98 1 °F (36 7 °C) (Oral)   Resp 20   Ht 5' 3" (1 6 m)   Wt 109 kg (240 lb)   LMP 05/27/2019         Follow-up Information     Follow up With Specialties Details Why Contact Info Additional [address] [facility] Emergency Medicine Go to  As needed, If symptoms worsen [address]  [phone]  ED, [address] [place] [address], [id number]          Discharge Medication List as of 6/21/2019  2:27 PM      CONTINUE these medications which have NOT CHANGED    Details   ALPRAZolam (XANAX) 1 mg tablet Take by mouth daily at bedtime as needed for anxiety, Historical Med      buPROPion (WELLBUTRIN SR) 200 MG 12 hr tablet Take 200 mg by mouth daily, Historical Med      escitalopram (LEXAPRO) 20 mg tablet Take 5 mg by mouth daily, Historical Med      QUEtiapine Fumarate (SEROQUEL PO) Take 300 mg by mouth daily at bedtime  , Historical Med      TRAZODONE HCL PO Take 150 mg by mouth daily at bedtime  , Historical Med      acetaminophen (TYLENOL) 500 mg tablet Take 1 tablet (500 mg total) by mouth every 6 (six) hours as needed for mild pain, moderate pain, headaches or fever, Starting Sat 10/13/2018, Normal      aluminum-magnesium hydroxide-simethicone (MAALOX) 200-200-20 MG/5ML SUSP Take 15 mL by mouth 4 (four) times a day (before meals and at bedtime), Starting Sun 5/19/2019, Print      dicyclomine (BENTYL) 20 mg tablet Take 1 tablet (20 mg total) by mouth 2 (two) times a day, Starting Sun 5/19/2019, Print      fluticasone (FLONASE) 50 mcg/act nasal spray 1 spray into each nostril daily, Starting Fri 1/4/2019, Print      ibuprofen (MOTRIN) 400 mg tablet Take 1 tablet (400 mg total) by mouth every 6 (six) hours as needed for mild pain, moderate pain or fever, Starting Sat 10/13/2018, Normal      ondansetron (ZOFRAN) 4 mg tablet Take 1 tablet (4 mg total) by mouth every 6 (six) hours, Starting Fri 1/4/2019, Print      ondansetron (ZOFRAN-ODT) 4 mg disintegrating tablet Take 1 tablet (4 mg total) by mouth every 8 (eight) hours as needed for nausea or vomiting for up to 10 days, Starting Sun 5/19/2019, Until Wed 5/29/2019, Print           No discharge procedures on file  ED Provider  Attending physically available and evaluated Morteza Handley I managed the patient along with the ED Attending      Electronically Signed by         Mauricio Saunders DO  06/21/19 8263

## 2019-07-18 ENCOUNTER — HOSPITAL ENCOUNTER (EMERGENCY)
Facility: HOSPITAL | Age: 34
Discharge: HOME | End: 2019-07-19
Attending: EMERGENCY MEDICINE
Payer: COMMERCIAL

## 2019-07-18 DIAGNOSIS — R10.13 EPIGASTRIC PAIN: Primary | ICD-10-CM

## 2019-07-18 LAB
ALBUMIN SERPL BCP-MCNC: 4.6 G/DL (ref 3–5.2)
ALP SERPL-CCNC: 76 U/L (ref 43–122)
ALT SERPL W P-5'-P-CCNC: 19 U/L (ref 9–52)
ANION GAP SERPL CALCULATED.3IONS-SCNC: 12 MMOL/L (ref 5–14)
AST SERPL W P-5'-P-CCNC: 17 U/L (ref 14–36)
BACTERIA UR QL AUTO: ABNORMAL /HPF
BASOPHILS # BLD AUTO: 0.1 THOUSANDS/ΜL (ref 0–0.1)
BASOPHILS NFR BLD AUTO: 1 % (ref 0–1)
BILIRUB SERPL-MCNC: 0.5 MG/DL
BILIRUB UR QL STRIP: NEGATIVE
BUN SERPL-MCNC: 11 MG/DL (ref 5–25)
CALCIUM SERPL-MCNC: 9.6 MG/DL (ref 8.4–10.2)
CHLORIDE SERPL-SCNC: 101 MMOL/L (ref 97–108)
CLARITY UR: ABNORMAL
CO2 SERPL-SCNC: 27 MMOL/L (ref 22–30)
COLOR UR: YELLOW
CREAT SERPL-MCNC: 0.68 MG/DL (ref 0.6–1.2)
EOSINOPHIL # BLD AUTO: 0 THOUSAND/ΜL (ref 0–0.4)
EOSINOPHIL NFR BLD AUTO: 0 % (ref 0–6)
ERYTHROCYTE [DISTWIDTH] IN BLOOD BY AUTOMATED COUNT: 17.4 %
EXT PREG TEST URINE: NEGATIVE
EXT. CONTROL ED NAV: NORMAL
GFR SERPL CREATININE-BSD FRML MDRD: 114 ML/MIN/1.73SQ M
GLUCOSE SERPL-MCNC: 118 MG/DL (ref 70–99)
GLUCOSE UR STRIP-MCNC: NEGATIVE MG/DL
HCT VFR BLD AUTO: 40.8 % (ref 36–46)
HGB BLD-MCNC: 12.9 G/DL (ref 12–16)
HGB UR QL STRIP.AUTO: 150
KETONES UR STRIP-MCNC: ABNORMAL MG/DL
LACTATE SERPL-SCNC: 1.7 MMOL/L (ref 0.7–2)
LEUKOCYTE ESTERASE UR QL STRIP: 25
LIPASE SERPL-CCNC: 85 U/L (ref 23–300)
LYMPHOCYTES # BLD AUTO: 3.5 THOUSANDS/ΜL (ref 0.5–4)
LYMPHOCYTES NFR BLD AUTO: 23 % (ref 25–45)
MCH RBC QN AUTO: 24.6 PG (ref 26–34)
MCHC RBC AUTO-ENTMCNC: 31.5 G/DL (ref 31–36)
MCV RBC AUTO: 78 FL (ref 80–100)
MICROCYTES BLD QL AUTO: PRESENT
MONOCYTES # BLD AUTO: 0.8 THOUSAND/ΜL (ref 0.2–0.9)
MONOCYTES NFR BLD AUTO: 6 % (ref 1–10)
MUCOUS THREADS UR QL AUTO: ABNORMAL
NEUTROPHILS # BLD AUTO: 10.5 THOUSANDS/ΜL (ref 1.8–7.8)
NEUTS SEG NFR BLD AUTO: 70 % (ref 45–65)
NITRITE UR QL STRIP: NEGATIVE
NON-SQ EPI CELLS URNS QL MICRO: ABNORMAL /HPF
PH UR STRIP.AUTO: 6 [PH]
PLATELET # BLD AUTO: 345 THOUSANDS/UL (ref 150–450)
PLATELET BLD QL SMEAR: ADEQUATE
PMV BLD AUTO: 8.8 FL (ref 8.9–12.7)
POTASSIUM SERPL-SCNC: 3.6 MMOL/L (ref 3.6–5)
PROT SERPL-MCNC: 8 G/DL (ref 5.9–8.4)
PROT UR STRIP-MCNC: ABNORMAL MG/DL
RBC # BLD AUTO: 5.24 MILLION/UL (ref 4–5.2)
RBC #/AREA URNS AUTO: ABNORMAL /HPF
RBC MORPH BLD: NORMAL
SODIUM SERPL-SCNC: 140 MMOL/L (ref 137–147)
SP GR UR STRIP.AUTO: 1.03 (ref 1–1.04)
UROBILINOGEN UA: 1 MG/DL
WBC # BLD AUTO: 14.9 THOUSAND/UL (ref 4.5–11)
WBC #/AREA URNS AUTO: ABNORMAL /HPF

## 2019-07-18 PROCEDURE — 99284 EMERGENCY DEPT VISIT MOD MDM: CPT

## 2019-07-18 PROCEDURE — 81001 URINALYSIS AUTO W/SCOPE: CPT | Performed by: EMERGENCY MEDICINE

## 2019-07-18 PROCEDURE — 81003 URINALYSIS AUTO W/O SCOPE: CPT | Performed by: EMERGENCY MEDICINE

## 2019-07-18 PROCEDURE — 83605 ASSAY OF LACTIC ACID: CPT | Performed by: EMERGENCY MEDICINE

## 2019-07-18 PROCEDURE — 96372 THER/PROPH/DIAG INJ SC/IM: CPT

## 2019-07-18 PROCEDURE — 81025 URINE PREGNANCY TEST: CPT | Performed by: EMERGENCY MEDICINE

## 2019-07-18 PROCEDURE — 96375 TX/PRO/DX INJ NEW DRUG ADDON: CPT

## 2019-07-18 PROCEDURE — 80053 COMPREHEN METABOLIC PANEL: CPT | Performed by: EMERGENCY MEDICINE

## 2019-07-18 PROCEDURE — 36415 COLL VENOUS BLD VENIPUNCTURE: CPT | Performed by: EMERGENCY MEDICINE

## 2019-07-18 PROCEDURE — 96374 THER/PROPH/DIAG INJ IV PUSH: CPT

## 2019-07-18 PROCEDURE — 96361 HYDRATE IV INFUSION ADD-ON: CPT

## 2019-07-18 PROCEDURE — 83690 ASSAY OF LIPASE: CPT | Performed by: EMERGENCY MEDICINE

## 2019-07-18 PROCEDURE — 99284 EMERGENCY DEPT VISIT MOD MDM: CPT | Performed by: EMERGENCY MEDICINE

## 2019-07-18 PROCEDURE — 85025 COMPLETE CBC W/AUTO DIFF WBC: CPT | Performed by: EMERGENCY MEDICINE

## 2019-07-18 RX ORDER — DICYCLOMINE HCL 20 MG
20 TABLET ORAL ONCE
Status: COMPLETED | OUTPATIENT
Start: 2019-07-18 | End: 2019-07-18

## 2019-07-18 RX ORDER — HALOPERIDOL 5 MG/ML
5 INJECTION INTRAMUSCULAR ONCE
Status: COMPLETED | OUTPATIENT
Start: 2019-07-18 | End: 2019-07-18

## 2019-07-18 RX ORDER — ZOLPIDEM TARTRATE 12.5 MG/1
12.5 TABLET, FILM COATED, EXTENDED RELEASE ORAL
COMMUNITY

## 2019-07-18 RX ORDER — KETOROLAC TROMETHAMINE 30 MG/ML
15 INJECTION, SOLUTION INTRAMUSCULAR; INTRAVENOUS ONCE
Status: COMPLETED | OUTPATIENT
Start: 2019-07-18 | End: 2019-07-18

## 2019-07-18 RX ORDER — MAGNESIUM HYDROXIDE/ALUMINUM HYDROXICE/SIMETHICONE 120; 1200; 1200 MG/30ML; MG/30ML; MG/30ML
30 SUSPENSION ORAL ONCE
Status: COMPLETED | OUTPATIENT
Start: 2019-07-18 | End: 2019-07-18

## 2019-07-18 RX ADMIN — FAMOTIDINE 20 MG: 10 INJECTION, SOLUTION INTRAVENOUS at 23:36

## 2019-07-18 RX ADMIN — KETOROLAC TROMETHAMINE 15 MG: 30 INJECTION, SOLUTION INTRAMUSCULAR; INTRAVENOUS at 23:36

## 2019-07-18 RX ADMIN — ALUMINUM HYDROXIDE, MAGNESIUM HYDROXIDE, AND SIMETHICONE 30 ML: 200; 200; 20 SUSPENSION ORAL at 23:32

## 2019-07-18 RX ADMIN — DICYCLOMINE HYDROCHLORIDE 20 MG: 20 TABLET ORAL at 23:31

## 2019-07-18 RX ADMIN — SODIUM CHLORIDE 1000 ML: 0.9 INJECTION, SOLUTION INTRAVENOUS at 23:36

## 2019-07-18 RX ADMIN — HALOPERIDOL LACTATE 5 MG: 5 INJECTION, SOLUTION INTRAMUSCULAR at 23:34

## 2019-07-19 ENCOUNTER — APPOINTMENT (EMERGENCY)
Dept: CT IMAGING | Facility: HOSPITAL | Age: 34
End: 2019-07-19
Payer: COMMERCIAL

## 2019-07-19 VITALS
DIASTOLIC BLOOD PRESSURE: 59 MMHG | BODY MASS INDEX: 42.42 KG/M2 | HEART RATE: 69 BPM | WEIGHT: 239.42 LBS | HEIGHT: 63 IN | OXYGEN SATURATION: 100 % | SYSTOLIC BLOOD PRESSURE: 116 MMHG | TEMPERATURE: 99.1 F | RESPIRATION RATE: 18 BRPM

## 2019-07-19 PROCEDURE — 74177 CT ABD & PELVIS W/CONTRAST: CPT

## 2019-07-19 PROCEDURE — 96361 HYDRATE IV INFUSION ADD-ON: CPT

## 2019-07-19 RX ORDER — SUCRALFATE ORAL 1 G/10ML
1 SUSPENSION ORAL 4 TIMES DAILY
Qty: 420 ML | Refills: 0 | Status: SHIPPED | OUTPATIENT
Start: 2019-07-19

## 2019-07-19 RX ORDER — FAMOTIDINE 20 MG/1
20 TABLET, FILM COATED ORAL 2 TIMES DAILY
Qty: 14 TABLET | Refills: 0 | Status: SHIPPED | OUTPATIENT
Start: 2019-07-19

## 2019-07-19 RX ADMIN — IOHEXOL 100 ML: 350 INJECTION, SOLUTION INTRAVENOUS at 00:22

## 2019-07-19 NOTE — ED PROVIDER NOTES
History  Chief Complaint   Patient presents with    Abdominal Pain     26-year-old female past medical history of anxiety, bipolar disorder presents for evaluation of ongoing epigastric pain  This is her 3rd visit for this complaint in the last 3 months  Patient states she has been having burning crampy epigastric pain, nonradiating, without any specific triggers that comes and goes and sometimes lasts minutes sometimes hours  Associated with multiple episodes of nonbilious vomitus and she did have small streaks of blood in the vomit recently but no kamila hematemesis, no diarrhea constipation  He had been evaluated for this in the past and has an appointment with gastroenterology in August but has been unable to get a sooner appointment  She otherwise denies any daily ibuprofen use denies any drug or alcohol use  She does smoke cigarettes but denies any marijuana use  No recent medication changes  In the past has been evaluated by blood work, CT without any apparent etiology of her abdominal pain  Did not take pain medicine for this prior to arrival   Of note patient is very redirectable which he started screaming in pain, is able to speak normally once redirected and once again start screaming in pain  Per mom this is been ongoing for last 2 weeks and patient spends multiple hours in the shower as she states that this is the only thing that helps her pain          Prior to Admission Medications   Prescriptions Last Dose Informant Patient Reported? Taking?    QUEtiapine Fumarate (SEROQUEL PO)  Self Yes No   Sig: Take 300 mg by mouth daily at bedtime     TRAZODONE HCL PO  Self Yes No   Sig: Take 150 mg by mouth daily at bedtime     acetaminophen (TYLENOL) 500 mg tablet   No No   Sig: Take 1 tablet (500 mg total) by mouth every 6 (six) hours as needed for mild pain, moderate pain, headaches or fever   aluminum-magnesium hydroxide-simethicone (MAALOX) 200-200-20 MG/5ML SUSP   No No   Sig: Take 15 mL by mouth 4 (four) times a day (before meals and at bedtime)   buPROPion (WELLBUTRIN SR) 200 MG 12 hr tablet   Yes No   Sig: Take 200 mg by mouth daily   escitalopram (LEXAPRO) 20 mg tablet   Yes No   Sig: Take 5 mg by mouth daily   ibuprofen (MOTRIN) 400 mg tablet   No No   Sig: Take 1 tablet (400 mg total) by mouth every 6 (six) hours as needed for mild pain, moderate pain or fever   ondansetron (ZOFRAN) 4 mg tablet   No No   Sig: Take 1 tablet (4 mg total) by mouth every 6 (six) hours   ondansetron (ZOFRAN-ODT) 4 mg disintegrating tablet   No No   Sig: Take 1 tablet (4 mg total) by mouth every 8 (eight) hours as needed for nausea or vomiting for up to 10 days   zolpidem (AMBIEN CR) 12 5 MG CR tablet  Self Yes Yes   Sig: Take 12 5 mg by mouth daily at bedtime as needed for sleep (unknown dosage)      Facility-Administered Medications: None       Past Medical History:   Diagnosis Date    Anxiety     [facility]     Diabetes mellitus ([facility] )        History reviewed  No pertinent surgical history  History reviewed  No pertinent family history  I have reviewed and agree with the history as documented  Social History     Tobacco Use    Smoking status: Current Every Day Smoker     Packs/day: 0 50    Smokeless tobacco: Never Used   Substance Use Topics    Alcohol use: Never     Frequency: Never    Drug use: Yes     Types: Marijuana     Comment: last used: 06/20/19        Review of Systems   Constitutional: Negative for appetite change and fever  HENT: Negative for rhinorrhea and sore throat  Eyes: Negative for photophobia and visual disturbance  Respiratory: Negative for cough, chest tightness and wheezing  Cardiovascular: Negative for chest pain, palpitations and leg swelling  Gastrointestinal: Positive for abdominal pain, nausea and vomiting  Negative for abdominal distention, blood in stool, constipation and diarrhea     Genitourinary: Negative for dysuria, flank pain, frequency, hematuria and urgency  Musculoskeletal: Negative for back pain  Skin: Negative for rash  Neurological: Negative for dizziness, weakness and headaches  All other systems reviewed and are negative  Physical Exam  Physical Exam   Constitutional: She is oriented to person, place, and time  She appears well-developed and well-nourished  HENT:   Head: Normocephalic and atraumatic  Eyes: Pupils are equal, round, and reactive to light  EOM are normal    Neck: Normal range of motion  Neck supple  Cardiovascular: Normal rate and regular rhythm  Exam reveals no gallop and no friction rub  No murmur heard  Pulmonary/Chest: Effort normal  She has no wheezes  She has no rales  She exhibits no tenderness  Abdominal: Soft  She exhibits no distension and no mass  There is no rebound and no guarding  Soft nontender abdomen, no rebound or guarding, no overlying skin lesions   Neurological: She is alert and oriented to person, place, and time  Skin: Skin is warm and dry  Psychiatric: She has a normal mood and affect  Nursing note and vitals reviewed        Vital Signs  ED Triage Vitals [07/18/19 2305]   Temperature Pulse Respirations Blood Pressure SpO2   99 °F (37 2 °C) 80 20 117/65 99 %      Temp Source Heart Rate Source Patient Position - Orthostatic VS BP Location FiO2 (%)   Tympanic Monitor Lying Right arm --      Pain Score       Worst Possible Pain           Vitals:    07/18/19 2305 07/19/19 0025 07/19/19 0144   BP: 117/65 127/68 116/59   Pulse: 80 60 69   Patient Position - Orthostatic VS: Lying Lying Lying         Visual Acuity      ED Medications  Medications   sodium chloride 0 9 % bolus 1,000 mL (0 mL Intravenous Stopped 7/19/19 0136)   aluminum-magnesium hydroxide-simethicone (MYLANTA) 200-200-20 mg/5 mL oral suspension 30 mL (30 mL Oral Given 7/18/19 2332)   famotidine (PEPCID) injection 20 mg (20 mg Intravenous Given 7/18/19 2336)   dicyclomine (BENTYL) tablet 20 mg (20 mg Oral Given 7/18/19 2331)   ketorolac (TORADOL) injection 15 mg (15 mg Intravenous Given 7/18/19 2336)   haloperidol lactate (HALDOL) injection 5 mg (5 mg Intramuscular Given 7/18/19 2334)   iohexol (OMNIPAQUE) 350 MG/ML injection (MULTI-DOSE) 100 mL (100 mL Intravenous Given 7/19/19 0022)       Diagnostic Studies  Results Reviewed     Procedure Component Value Units Date/Time    Lactic acid, plasma [[id number]]  (Normal) Collected:  07/18/19 2332    Lab Status:  Final result Specimen:  Blood from Arm, Left Updated:  07/18/19 2357     LACTIC ACID 1 7 mmol/L     Narrative:       Result may be elevated if tourniquet was used during collection      Comprehensive metabolic panel [[id number]]  (Abnormal) Collected:  07/18/19 2332    Lab Status:  Final result Specimen:  Blood from Arm, Left Updated:  07/18/19 2350     Sodium 140 mmol/L      Potassium 3 6 mmol/L      Chloride 101 mmol/L      CO2 27 mmol/L      ANION GAP 12 mmol/L      BUN 11 mg/dL      Creatinine 0 68 mg/dL      Glucose 118 mg/dL      Calcium 9 6 mg/dL      AST 17 U/L      ALT 19 U/L      Alkaline Phosphatase 76 U/L      Total Protein 8 0 g/dL      Albumin 4 6 g/dL      Total Bilirubin 0 50 mg/dL      eGFR 114 ml/min/1 73sq m     Narrative:       [place] guidelines for Chronic Kidney Disease (CKD):     Stage 1 with normal or high GFR (GFR > 90 mL/min/1 73 square meters)    Stage 2 Mild CKD (GFR = 60-89 mL/min/1 73 square meters)    Stage 3A Moderate CKD (GFR = 45-59 mL/min/1 73 square meters)    Stage 3B Moderate CKD (GFR = 30-44 mL/min/1 73 square meters)    Stage 4 Severe CKD (GFR = 15-29 mL/min/1 73 square meters)    Stage 5 End Stage CKD (GFR <15 mL/min/1 73 square meters)  Note: GFR calculation is accurate only with a steady state creatinine    Lipase [[id number]]  (Normal) Collected:  07/18/19 2332    Lab Status:  Final result Specimen:  Blood from Arm, Left Updated:  07/18/19 2350     Lipase 85 u/L     CBC and differential [[id number]]  (Abnormal) Collected:  07/18/19 2332    Lab Status:  Final result Specimen:  Blood from Arm, Left Updated:  07/18/19 2345     WBC 14 90 Thousand/uL      RBC 5 24 Million/uL      Hemoglobin 12 9 g/dL      Hematocrit 40 8 %      MCV 78 fL      MCH 24 6 pg      MCHC 31 5 g/dL      RDW 17 4 %      MPV 8 8 fL      Platelets 878 Thousands/uL      Neutrophils Relative 70 %      Lymphocytes Relative 23 %      Monocytes Relative 6 %      Eosinophils Relative 0 %      Basophils Relative 1 %      Neutrophils Absolute 10 50 Thousands/µL      Lymphocytes Absolute 3 50 Thousands/µL      Monocytes Absolute 0 80 Thousand/µL      Eosinophils Absolute 0 00 Thousand/µL      Basophils Absolute 0 10 Thousands/µL     Urine Microscopic [[id number]]  (Abnormal) Collected:  07/18/19 2325    Lab Status:  Final result Specimen:  Urine, Clean Catch Updated:  07/18/19 2344     RBC, UA 2-4 /hpf      WBC, UA 2-4 /hpf      Epithelial Cells Occasional /hpf      Bacteria, UA Occasional /hpf      MUCUS THREADS Occasional    UA w Reflex to Microscopic w Reflex to Culture [[id number]]  (Abnormal) Collected:  07/18/19 2325    Lab Status:  Final result Specimen:  Urine, Clean Catch Updated:  07/18/19 2339     Color, UA Yellow     Clarity, UA Slightly Cloudy     Specific [place], UA 1 030     pH, UA 6 0     Leukocytes, UA 25 0     Nitrite, UA Negative     Protein, UA 30 (1+) mg/dl      Glucose, UA Negative mg/dl      Ketones, UA 5 (Trace) mg/dl      Bilirubin, UA Negative     Blood,  0     UROBILINOGEN UA 1 0 mg/dL     POCT pregnancy, urine [[id number]]  (Normal) Resulted:  07/18/19 2330    Lab Status:  Final result Updated:  07/18/19 2330     EXT PREG TEST UR (Ref: Negative) negative     Control valid                 CT abdomen pelvis with contrast   Final Result by Quiana Mejia MD (07/19 0109)      No evidence of bowel obstruction  Normal appendix  Hepatomegaly  Mild fatty infiltration of the liver              Workstation performed: [id number]                    Procedures  Procedures       ED Course  ED Course as of Jul 19 0257   Thu Jul 18, 2019   2339 Patient resting comfortably, no acute complaints      Fri Jul 19, 2019   0031 Resting comfortably, no acute complaints                                  [facility]  Number of Diagnoses or Management Options  Diagnosis management comments: 66-year-old female with continued abdominal pain, will obtain repeat lab work, will treat symptomatically and re-evaluate      Disposition  Final diagnoses:   Epigastric pain     Time reflects when diagnosis was documented in both MDM as applicable and the Disposition within this note     Time User Action Codes Description Comment    7/19/2019 12:55 AM Malai Campbell Add [R10 13] Epigastric pain       ED Disposition     ED Disposition Condition Date/Time Comment    Discharge Stable Fri Jul 19, 2019 12:55 AM Ruben Mann discharge to home/self care              Follow-up Information     Follow up With Specialties Details Why Contact Info Additional Information    [facility] Emergency Department Emergency Medicine  If symptoms worsen [address] [phone] [address] [facility] Primary Family Medicine  As needed [address] [facility] Schedule an appointment as soon as possible for a visit   [email or web address]  [place] [address] [facility] [id number]  Laureen Mccracken [address] [facility], [address],  [address] [place] [address], [phone]          Discharge Medication List as of 7/19/2019 12:56 AM      START taking these medications    Details   famotidine (PEPCID) 20 mg tablet Take 1 tablet (20 mg total) by mouth 2 (two) times a day, Starting Fri 7/19/2019, Print      sucralfate (CARAFATE) 1 g/10 mL suspension Take 10 mL (1 g total) by mouth 4 (four) times a day, Starting Fri 7/19/2019, Print         CONTINUE these medications which have NOT CHANGED    Details   zolpidem (AMBIEN CR) 12 5 MG CR tablet Take 12 5 mg by mouth daily at bedtime as needed for sleep (unknown dosage), Historical Med      acetaminophen (TYLENOL) 500 mg tablet Take 1 tablet (500 mg total) by mouth every 6 (six) hours as needed for mild pain, moderate pain, headaches or fever, Starting Sat 10/13/2018, Normal      aluminum-magnesium hydroxide-simethicone (MAALOX) 200-200-20 MG/5ML SUSP Take 15 mL by mouth 4 (four) times a day (before meals and at bedtime), Starting Sun 5/19/2019, Print      buPROPion (WELLBUTRIN SR) 200 MG 12 hr tablet Take 200 mg by mouth daily, Historical Med      escitalopram (LEXAPRO) 20 mg tablet Take 5 mg by mouth daily, Historical Med      ibuprofen (MOTRIN) 400 mg tablet Take 1 tablet (400 mg total) by mouth every 6 (six) hours as needed for mild pain, moderate pain or fever, Starting Sat 10/13/2018, Normal      ondansetron (ZOFRAN) 4 mg tablet Take 1 tablet (4 mg total) by mouth every 6 (six) hours, Starting Fri 1/4/2019, Print      ondansetron (ZOFRAN-ODT) 4 mg disintegrating tablet Take 1 tablet (4 mg total) by mouth every 8 (eight) hours as needed for nausea or vomiting for up to 10 days, Starting Sun 5/19/2019, Until Wed 5/29/2019, Print      QUEtiapine Fumarate (SEROQUEL PO) Take 300 mg by mouth daily at bedtime  , Historical Med      TRAZODONE HCL PO Take 150 mg by mouth daily at bedtime  , Historical Med           No discharge procedures on file      ED Provider  Electronically Signed by           Wyatt Anderson MD  07/19/19 5866

## 2019-07-19 NOTE — DISCHARGE INSTRUCTIONS
Please follow-up with gastroenterology for further care, if symptoms worsen please return to the emergency department

## 2019-08-25 ENCOUNTER — HOSPITAL ENCOUNTER (EMERGENCY)
Facility: HOSPITAL | Age: 34
Discharge: LEFT BEFORE DISCHARGE | End: 2019-08-25
Attending: EMERGENCY MEDICINE | Admitting: EMERGENCY MEDICINE
Payer: COMMERCIAL

## 2019-08-25 VITALS
OXYGEN SATURATION: 100 % | TEMPERATURE: 97.1 F | HEART RATE: 71 BPM | HEIGHT: 62 IN | DIASTOLIC BLOOD PRESSURE: 77 MMHG | RESPIRATION RATE: 20 BRPM | WEIGHT: 223.77 LBS | SYSTOLIC BLOOD PRESSURE: 126 MMHG | BODY MASS INDEX: 41.18 KG/M2

## 2019-08-25 DIAGNOSIS — R11.2 NAUSEA & VOMITING: Primary | ICD-10-CM

## 2019-08-25 LAB
ALBUMIN SERPL BCP-MCNC: 4.9 G/DL (ref 3–5.2)
ALBUMIN SERPL BCP-MCNC: 4.9 G/DL (ref 3–5.2)
ALP SERPL-CCNC: 100 U/L (ref 43–122)
ALP SERPL-CCNC: 95 U/L (ref 43–122)
ALT SERPL W P-5'-P-CCNC: 20 U/L (ref 9–52)
ALT SERPL W P-5'-P-CCNC: <6 U/L (ref 9–52)
ANION GAP SERPL CALCULATED.3IONS-SCNC: 13 MMOL/L (ref 5–14)
ANION GAP SERPL CALCULATED.3IONS-SCNC: 8 MMOL/L (ref 5–14)
ANISOCYTOSIS BLD QL SMEAR: PRESENT
AST SERPL W P-5'-P-CCNC: 25 U/L (ref 14–36)
AST SERPL W P-5'-P-CCNC: 67 U/L (ref 14–36)
B-HCG SERPL-ACNC: <3 MIU/ML
BACTERIA UR QL AUTO: ABNORMAL /HPF
BASOPHILS # BLD AUTO: 0.1 THOUSANDS/ΜL (ref 0–0.1)
BASOPHILS NFR BLD AUTO: 1 % (ref 0–1)
BILIRUB SERPL-MCNC: 0.5 MG/DL
BILIRUB SERPL-MCNC: 1.7 MG/DL
BILIRUB UR QL STRIP: NEGATIVE
BUN SERPL-MCNC: 8 MG/DL (ref 5–25)
BUN SERPL-MCNC: 9 MG/DL (ref 5–25)
CALCIUM SERPL-MCNC: 9.5 MG/DL (ref 8.4–10.2)
CALCIUM SERPL-MCNC: 9.9 MG/DL (ref 8.4–10.2)
CAOX CRY URNS QL MICRO: ABNORMAL /HPF
CHLORIDE SERPL-SCNC: 104 MMOL/L (ref 97–108)
CHLORIDE SERPL-SCNC: 106 MMOL/L (ref 97–108)
CLARITY UR: ABNORMAL
CO2 SERPL-SCNC: 23 MMOL/L (ref 22–30)
CO2 SERPL-SCNC: 23 MMOL/L (ref 22–30)
COLOR UR: ABNORMAL
CREAT SERPL-MCNC: 0.56 MG/DL (ref 0.6–1.2)
CREAT SERPL-MCNC: 0.62 MG/DL (ref 0.6–1.2)
EOSINOPHIL # BLD AUTO: 0.2 THOUSAND/ΜL (ref 0–0.4)
EOSINOPHIL NFR BLD AUTO: 1 % (ref 0–6)
ERYTHROCYTE [DISTWIDTH] IN BLOOD BY AUTOMATED COUNT: 19.4 %
EXT PREG TEST URINE: NEGATIVE
EXT. CONTROL ED NAV: NORMAL
GFR SERPL CREATININE-BSD FRML MDRD: 118 ML/MIN/1.73SQ M
GFR SERPL CREATININE-BSD FRML MDRD: 122 ML/MIN/1.73SQ M
GLUCOSE SERPL-MCNC: 105 MG/DL (ref 70–99)
GLUCOSE SERPL-MCNC: 114 MG/DL (ref 70–99)
GLUCOSE UR STRIP-MCNC: NEGATIVE MG/DL
HCT VFR BLD AUTO: 44.5 % (ref 36–46)
HGB BLD-MCNC: 14 G/DL (ref 12–16)
HGB UR QL STRIP.AUTO: 50
HYPERCHROMIA BLD QL SMEAR: PRESENT
KETONES UR STRIP-MCNC: NEGATIVE MG/DL
LEUKOCYTE ESTERASE UR QL STRIP: 100
LIPASE SERPL-CCNC: 71 U/L (ref 23–300)
LIPASE SERPL-CCNC: 80 U/L (ref 23–300)
LYMPHOCYTES # BLD AUTO: 3.1 THOUSANDS/ΜL (ref 0.5–4)
LYMPHOCYTES NFR BLD AUTO: 25 % (ref 25–45)
MCH RBC QN AUTO: 24.5 PG (ref 26–34)
MCHC RBC AUTO-ENTMCNC: 31.4 G/DL (ref 31–36)
MCV RBC AUTO: 78 FL (ref 80–100)
MONOCYTES # BLD AUTO: 0.5 THOUSAND/ΜL (ref 0.2–0.9)
MONOCYTES NFR BLD AUTO: 4 % (ref 1–10)
NEUTROPHILS # BLD AUTO: 8.3 THOUSANDS/ΜL (ref 1.8–7.8)
NEUTS SEG NFR BLD AUTO: 69 % (ref 45–65)
NITRITE UR QL STRIP: NEGATIVE
NON-SQ EPI CELLS URNS QL MICRO: ABNORMAL /HPF
OTHER STN SPEC: ABNORMAL
PH UR STRIP.AUTO: 5 [PH]
PLATELET # BLD AUTO: 307 THOUSANDS/UL (ref 150–450)
PLATELET BLD QL SMEAR: ADEQUATE
PMV BLD AUTO: 9 FL (ref 8.9–12.7)
POTASSIUM SERPL-SCNC: 3.6 MMOL/L (ref 3.6–5)
POTASSIUM SERPL-SCNC: 5.9 MMOL/L (ref 3.6–5)
PROT SERPL-MCNC: 8.9 G/DL (ref 5.9–8.4)
PROT SERPL-MCNC: 9.1 G/DL (ref 5.9–8.4)
PROT UR STRIP-MCNC: ABNORMAL MG/DL
RBC # BLD AUTO: 5.71 MILLION/UL (ref 4–5.2)
RBC #/AREA URNS AUTO: ABNORMAL /HPF
RBC MORPH BLD: NORMAL
SODIUM SERPL-SCNC: 137 MMOL/L (ref 137–147)
SODIUM SERPL-SCNC: 140 MMOL/L (ref 137–147)
SP GR UR STRIP.AUTO: 1.02 (ref 1–1.04)
UROBILINOGEN UA: NEGATIVE MG/DL
WBC # BLD AUTO: 12.2 THOUSAND/UL (ref 4.5–11)
WBC #/AREA URNS AUTO: ABNORMAL /HPF

## 2019-08-25 PROCEDURE — 83690 ASSAY OF LIPASE: CPT | Performed by: PHYSICIAN ASSISTANT

## 2019-08-25 PROCEDURE — 99284 EMERGENCY DEPT VISIT MOD MDM: CPT | Performed by: PHYSICIAN ASSISTANT

## 2019-08-25 PROCEDURE — 99282 EMERGENCY DEPT VISIT SF MDM: CPT

## 2019-08-25 PROCEDURE — 81003 URINALYSIS AUTO W/O SCOPE: CPT | Performed by: PHYSICIAN ASSISTANT

## 2019-08-25 PROCEDURE — 81025 URINE PREGNANCY TEST: CPT | Performed by: PHYSICIAN ASSISTANT

## 2019-08-25 PROCEDURE — 84702 CHORIONIC GONADOTROPIN TEST: CPT | Performed by: PHYSICIAN ASSISTANT

## 2019-08-25 PROCEDURE — 85025 COMPLETE CBC W/AUTO DIFF WBC: CPT | Performed by: PHYSICIAN ASSISTANT

## 2019-08-25 PROCEDURE — 96361 HYDRATE IV INFUSION ADD-ON: CPT

## 2019-08-25 PROCEDURE — 81001 URINALYSIS AUTO W/SCOPE: CPT | Performed by: PHYSICIAN ASSISTANT

## 2019-08-25 PROCEDURE — 96374 THER/PROPH/DIAG INJ IV PUSH: CPT

## 2019-08-25 PROCEDURE — 80053 COMPREHEN METABOLIC PANEL: CPT | Performed by: PHYSICIAN ASSISTANT

## 2019-08-25 PROCEDURE — 36415 COLL VENOUS BLD VENIPUNCTURE: CPT | Performed by: PHYSICIAN ASSISTANT

## 2019-08-25 RX ORDER — ACETAMINOPHEN 325 MG/1
650 TABLET ORAL ONCE
Status: COMPLETED | OUTPATIENT
Start: 2019-08-25 | End: 2019-08-25

## 2019-08-25 RX ORDER — SODIUM CHLORIDE 9 MG/ML
250 INJECTION, SOLUTION INTRAVENOUS CONTINUOUS
Status: DISCONTINUED | OUTPATIENT
Start: 2019-08-25 | End: 2019-08-25 | Stop reason: HOSPADM

## 2019-08-25 RX ORDER — SUCRALFATE ORAL 1 G/10ML
1000 SUSPENSION ORAL ONCE
Status: COMPLETED | OUTPATIENT
Start: 2019-08-25 | End: 2019-08-25

## 2019-08-25 RX ORDER — MAGNESIUM HYDROXIDE/ALUMINUM HYDROXICE/SIMETHICONE 120; 1200; 1200 MG/30ML; MG/30ML; MG/30ML
30 SUSPENSION ORAL ONCE
Status: COMPLETED | OUTPATIENT
Start: 2019-08-25 | End: 2019-08-25

## 2019-08-25 RX ORDER — ONDANSETRON 2 MG/ML
4 INJECTION INTRAMUSCULAR; INTRAVENOUS ONCE
Status: COMPLETED | OUTPATIENT
Start: 2019-08-25 | End: 2019-08-25

## 2019-08-25 RX ADMIN — ALUMINUM HYDROXIDE, MAGNESIUM HYDROXIDE, AND SIMETHICONE 30 ML: 200; 200; 20 SUSPENSION ORAL at 15:10

## 2019-08-25 RX ADMIN — ACETAMINOPHEN 650 MG: 325 TABLET ORAL at 15:13

## 2019-08-25 RX ADMIN — ONDANSETRON 4 MG: 2 INJECTION, SOLUTION INTRAMUSCULAR; INTRAVENOUS at 15:11

## 2019-08-25 RX ADMIN — SUCRALFATE 1000 MG: 1 SUSPENSION ORAL at 15:10

## 2019-08-25 RX ADMIN — SODIUM CHLORIDE 250 ML/HR: 0.9 INJECTION, SOLUTION INTRAVENOUS at 14:50

## 2019-08-25 NOTE — ED PROVIDER NOTES
History  Chief Complaint   Patient presents with    Pregnancy Test     has had N/V for 2 weeks  LMP   all OTC tests were Negative       History provided by:  Patient   used: No    Medical Problem   Location:  Pt with request for pregnancy test  pt with morning nausea and vomiting and intermittent pelvis pain and cramping  no pain now  Severity:  Mild  Onset quality:  Gradual  Duration:  1 week  Progression:  Resolved  Chronicity:  New  Context:  Last menses 2019   a0  Associated symptoms: no abdominal pain, no chest pain, no congestion, no cough, no diarrhea, no ear pain, no fatigue, no fever, no headaches, no loss of consciousness, no myalgias, no nausea, no rash, no rhinorrhea, no shortness of breath, no sore throat, no vomiting and no wheezing        Prior to Admission Medications   Prescriptions Last Dose Informant Patient Reported? Taking?    QUEtiapine Fumarate (SEROQUEL PO)  Self Yes No   Sig: Take 300 mg by mouth daily at bedtime     TRAZODONE HCL PO  Self Yes No   Sig: Take 150 mg by mouth daily at bedtime     acetaminophen (TYLENOL) 500 mg tablet   No No   Sig: Take 1 tablet (500 mg total) by mouth every 6 (six) hours as needed for mild pain, moderate pain, headaches or fever   aluminum-magnesium hydroxide-simethicone (MAALOX) 200-200-20 MG/5ML SUSP   No No   Sig: Take 15 mL by mouth 4 (four) times a day (before meals and at bedtime)   buPROPion (WELLBUTRIN SR) 200 MG 12 hr tablet   Yes No   Sig: Take 200 mg by mouth daily   escitalopram (LEXAPRO) 20 mg tablet   Yes No   Sig: Take 5 mg by mouth daily   famotidine (PEPCID) 20 mg tablet   No No   Sig: Take 1 tablet (20 mg total) by mouth 2 (two) times a day   ibuprofen (MOTRIN) 400 mg tablet   No No   Sig: Take 1 tablet (400 mg total) by mouth every 6 (six) hours as needed for mild pain, moderate pain or fever   ondansetron (ZOFRAN) 4 mg tablet   No No   Sig: Take 1 tablet (4 mg total) by mouth every 6 (six) hours ondansetron (ZOFRAN-ODT) 4 mg disintegrating tablet   No No   Sig: Take 1 tablet (4 mg total) by mouth every 8 (eight) hours as needed for nausea or vomiting for up to 10 days   sucralfate (CARAFATE) 1 g/10 mL suspension   No No   Sig: Take 10 mL (1 g total) by mouth 4 (four) times a day   zolpidem (AMBIEN CR) 12 5 MG CR tablet  Self Yes No   Sig: Take 12 5 mg by mouth daily at bedtime as needed for sleep (unknown dosage)      Facility-Administered Medications: None       Past Medical History:   Diagnosis Date    Anxiety     Bipolar disease, chronic ([facility] [address] )     Diabetes mellitus ([facility] )        History reviewed  No pertinent surgical history  History reviewed  No pertinent family history  I have reviewed and agree with the history as documented  Social History     Tobacco Use    Smoking status: Current Every Day Smoker     Packs/day: 0 50    Smokeless tobacco: Never Used   Substance Use Topics    Alcohol use: Never     Frequency: Never    Drug use: Yes     Types: Marijuana     Comment: last used: 06/20/19        Review of Systems   Constitutional: Negative  Negative for fatigue and fever  HENT: Negative  Negative for congestion, ear pain, rhinorrhea and sore throat  Eyes: Negative  Respiratory: Negative  Negative for cough, shortness of breath and wheezing  Cardiovascular: Negative  Negative for chest pain  Gastrointestinal: Negative  Negative for abdominal pain, diarrhea, nausea and vomiting  Endocrine: Negative  Genitourinary: Negative  Musculoskeletal: Negative  Negative for myalgias  Skin: Negative  Negative for rash  Allergic/Immunologic: Negative  Neurological: Negative  Negative for loss of consciousness and headaches  Hematological: Negative  Psychiatric/Behavioral: Negative  All other systems reviewed and are negative  Physical Exam  Physical Exam   Constitutional: She is oriented to person, place, and time   She appears well-developed and well-nourished  Pt 420pm  States she has to leave this very minute    Pt will sign ama form    HENT:   Head: Normocephalic and atraumatic  Right Ear: External ear normal    Left Ear: External ear normal    Nose: Nose normal    Mouth/Throat: Oropharynx is clear and moist    Eyes: Pupils are equal, round, and reactive to light  Conjunctivae and EOM are normal    Neck: Normal range of motion  Neck supple  Cardiovascular: Normal rate, regular rhythm and normal heart sounds  Pulmonary/Chest: Effort normal and breath sounds normal    Abdominal: Soft  Bowel sounds are normal    300pm  Pt with midepigastric pain and nausea   No pelvis pain    Musculoskeletal: Normal range of motion  Neurological: She is alert and oriented to person, place, and time  Skin: Skin is warm  Capillary refill takes less than 2 seconds  Psychiatric: She has a normal mood and affect  Her behavior is normal    Nursing note and vitals reviewed        Vital Signs  ED Triage Vitals [08/25/19 1421]   Temperature Pulse Respirations Blood Pressure SpO2   (!) 97 1 °F (36 2 °C) 71 20 126/77 100 %      Temp Source Heart Rate Source Patient Position - Orthostatic VS BP Location FiO2 (%)   Tympanic Monitor -- Left arm --      Pain Score       2           Vitals:    08/25/19 1421   BP: 126/77   Pulse: 71         Visual Acuity      ED Medications  Medications   sodium chloride 0 9 % infusion (0 mL/hr Intravenous Stopped 8/25/19 1632)   acetaminophen (TYLENOL) tablet 650 mg (650 mg Oral Given 8/25/19 1513)   ondansetron (ZOFRAN) injection 4 mg (4 mg Intravenous Given 8/25/19 1511)   aluminum-magnesium hydroxide-simethicone (MYLANTA) 200-200-20 mg/5 mL oral suspension 30 mL (30 mL Oral Given 8/25/19 1510)   sucralfate (CARAFATE) oral suspension 1,000 mg (1,000 mg Oral Given 8/25/19 1510)       Diagnostic Studies  Results Reviewed     Procedure Component Value Units Date/Time    hCG, quantitative [[id number]]  (Normal) Collected:  08/25/19 1532    Lab Status:  Final result Specimen:  Blood from Arm, Right Updated:  08/25/19 1618     HCG, Quant <3 mIU/mL     Narrative:       Pregnant Gestational Age           HCG Range (mIU/mL)  4 weeks                              44 - 6952  5 weeks                              348 - 36678        6 - 7 weeks                          975 - 826213  8 - 10 weeks                         88195 - 434225  11 - 12 weeks                        90361 - 047301  13 - 27 weeks (2nd Trimester)        3468 - 715126  28 - 40 weeks (3rd Trimester)        0921 - 644371                 Lipase [[id number]]  (Normal) Collected:  08/25/19 1532    Lab Status:  Final result Specimen:  Blood from Arm, Right Updated:  08/25/19 1603     Lipase 80 u/L     Comprehensive metabolic panel [[id number]]  (Abnormal) Collected:  08/25/19 1532    Lab Status:  Final result Specimen:  Blood from Arm, Right Updated:  08/25/19 1603     Sodium 140 mmol/L      Potassium 3 6 mmol/L      Chloride 104 mmol/L      CO2 23 mmol/L      ANION GAP 13 mmol/L      BUN 8 mg/dL      Creatinine 0 62 mg/dL      Glucose 114 mg/dL      Calcium 9 9 mg/dL      AST 25 U/L      ALT 20 U/L      Alkaline Phosphatase 100 U/L      Total Protein 8 9 g/dL      Albumin 4 9 g/dL      Total Bilirubin 0 50 mg/dL      eGFR 118 ml/min/1 73sq m     Narrative:       [facility] guidelines for Chronic Kidney Disease (CKD):     Stage 1 with normal or high GFR (GFR > 90 mL/min/1 73 square meters)    Stage 2 Mild CKD (GFR = 60-89 mL/min/1 73 square meters)    Stage 3A Moderate CKD (GFR = 45-59 mL/min/1 73 square meters)    Stage 3B Moderate CKD (GFR = 30-44 mL/min/1 73 square meters)    Stage 4 Severe CKD (GFR = 15-29 mL/min/1 73 square meters)    Stage 5 End Stage CKD (GFR <15 mL/min/1 73 square meters)  Note: GFR calculation is accurate only with a steady state creatinine    Comprehensive metabolic panel [[id number]]  (Abnormal) Collected:  08/25/19 1441    Lab Status:  Final result Specimen:  Blood from Hand, Left Updated:  08/25/19 1511     Sodium 137 mmol/L      Potassium 5 9 mmol/L      Chloride 106 mmol/L      CO2 23 mmol/L      ANION GAP 8 mmol/L      BUN 9 mg/dL      Creatinine 0 56 mg/dL      Glucose 105 mg/dL      Calcium 9 5 mg/dL      AST 67 U/L      ALT <6 U/L      Alkaline Phosphatase 95 U/L      Total Protein 9 1 g/dL      Albumin 4 9 g/dL      Total Bilirubin 1 70 mg/dL      eGFR 122 ml/min/1 73sq m     Narrative:       Gross Hemolysis  National Kid[employer] Foundation guidelines for Chronic Kidney Disease (CKD):     Stage 1 with normal or high GFR (GFR > 90 mL/min/1 73 square meters)    Stage 2 Mild CKD (GFR = 60-89 mL/min/1 73 square meters)    Stage 3A Moderate CKD (GFR = 45-59 mL/min/1 73 square meters)    Stage 3B Moderate CKD (GFR = 30-44 mL/min/1 73 square meters)    Stage 4 Severe CKD (GFR = 15-29 mL/min/1 73 square meters)    Stage 5 End Stage CKD (GFR <15 mL/min/1 73 square meters)  Note: GFR calculation is accurate only with a steady state creatinine    Lipase [[id number]]  (Normal) Collected:  08/25/19 1441    Lab Status:  Final result Specimen:  Blood from Hand, Left Updated:  08/25/19 1503     Lipase 71 u/L     Narrative:       Gross Hemolysis    CBC and differential [[id number]]  (Abnormal) Collected:  08/25/19 1441    Lab Status:  Final result Specimen:  Blood from Hand, Left Updated:  08/25/19 1455     WBC 12 20 Thousand/uL      RBC 5 71 Million/uL      Hemoglobin 14 0 g/dL      Hematocrit 44 5 %      MCV 78 fL      MCH 24 5 pg      MCHC 31 4 g/dL      RDW 19 4 %      MPV 9 0 fL      Platelets 833 Thousands/uL      Neutrophils Relative 69 %      Lymphocytes Relative 25 %      Monocytes Relative 4 %      Eosinophils Relative 1 %      Basophils Relative 1 %      Neutrophils Absolute 8 30 Thousands/µL      Lymphocytes Absolute 3 10 Thousands/µL      Monocytes Absolute 0 50 Thousand/µL      Eosinophils Absolute 0 20 Thousand/µL      Basophils Absolute 0 10 Thousands/µL     Chlamydia/GC amplified DNA by PCR [[id number]]     Lab Status:  No result     VAGINOSIS DNA PROBE (AFFIRM) [[id number]]     Lab Status:  No result Specimen:  Genital     Urine Microscopic [[id number]]  (Abnormal) Collected:  08/25/19 1427    Lab Status:  Final result Specimen:  Urine, Other Updated:  08/25/19 1448     RBC, UA 2-4 /hpf      WBC, UA 2-4 /hpf      Epithelial Cells Innumerable /hpf      Bacteria, UA Moderate /hpf      Ca Oxalate Jennifer, UA Moderate /hpf      OTHER OBSERVATIONS Yeast Cells Present    UA w Reflex to Microscopic w Reflex to Culture [[id number]]  (Abnormal) Collected:  08/25/19 1427    Lab Status:  Final result Specimen:  Urine, Other Updated:  08/25/19 1438     Color, UA Lianna     Clarity, UA Cloudy     Specific Gravity, UA 1 025     pH, UA 5 0     Leukocytes,  0     Nitrite, UA Negative     Protein, UA 15 (Trace) mg/dl      Glucose, UA Negative mg/dl      Ketones, UA Negative mg/dl      Bilirubin, UA Negative     Blood, UA 50 0     UROBILINOGEN UA Negative mg/dL     POCT pregnancy, urine [[id number]]  (Normal) Resulted:  08/25/19 1428    Lab Status:  Final result Updated:  08/25/19 1428     EXT PREG TEST UR (Ref: Negative) negative     Control valid                 No orders to display              Procedures  Procedures       ED Course                               MDM    Disposition  Final diagnoses:   Nausea & vomiting     Time reflects when diagnosis was documented in both MDM as applicable and the Disposition within this note     Time User Action Codes Description Comment    8/25/2019  4:31 PM Reford Leonor   Add [R11 2] Nausea & vomiting       ED Disposition     ED Disposition Condition Date/Time Comment    [place]  Sun Aug 25, 2019  4:31 PM Date: 8/25/2019  Patient: Churchill Shadow  Admitted: 8/25/2019  2:13 PM  Attending Provider: Samia Lin DO    Churchill Shadow or her authorized caregiver has made the decision for the patient to leave the emergency department aga inst the advice of the emergency department staff  She or her authorized caregiver has been informed and understands the inherent risks, including death, disability   She or her authorized caregiver has decided to accept the responsibility for this  decision  Monae Goss and all necessary parties have been advised that she may return for further evaluation or treatment  Her condition at time of discharge was stable     Monae Goss had current vital signs as follows:  / 77 (BP Location: Left arm)   Pulse 71   Temp (!) 97 1 °F (36 2 °C) (Tympanic)   Resp 20   Ht 5' 2" (1 575 m)   Wt 101 kg (223 lb 12 3 oz)   LMP 06/27/2019         Follow-up Information     Follow up With Specialties Details Why [place]0 Izaiah Cartwright [address]  [phone]            Discharge Medication List as of 8/25/2019  4:31 PM      CONTINUE these medications which have NOT CHANGED    Details   acetaminophen (TYLENOL) 500 mg tablet Take 1 tablet (500 mg total) by mouth every 6 (six) hours as needed for mild pain, moderate pain, headaches or fever, Starting Sat 10/13/2018, Normal      aluminum-magnesium hydroxide-simethicone (MAALOX) 200-200-20 MG/5ML SUSP Take 15 mL by mouth 4 (four) times a day (before meals and at bedtime), Starting Sun 5/19/2019, Print      buPROPion (WELLBUTRIN SR) 200 MG 12 hr tablet Take 200 mg by mouth daily, Historical Med      escitalopram (LEXAPRO) 20 mg tablet Take 5 mg by mouth daily, Historical Med      famotidine (PEPCID) 20 mg tablet Take 1 tablet (20 mg total) by mouth 2 (two) times a day, Starting Fri 7/19/2019, Print      ibuprofen (MOTRIN) 400 mg tablet Take 1 tablet (400 mg total) by mouth every 6 (six) hours as needed for mild pain, moderate pain or fever, Starting Sat 10/13/2018, Normal      ondansetron (ZOFRAN) 4 mg tablet Take 1 tablet (4 mg total) by mouth every 6 (six) hours, Starting Fri 1/4/2019, Print      ondansetron (ZOFRAN-ODT) 4 mg disintegrating tablet Take 1 tablet (4 mg total) by mouth every 8 (eight) hours as needed for nausea or vomiting for up to 10 days, Starting Sun 5/19/2019, Until Wed 5/29/2019, Print      QUEtiapine Fumarate (SEROQUEL PO) Take 300 mg by mouth daily at bedtime  , Historical Med      sucralfate (CARAFATE) 1 g/10 mL suspension Take 10 mL (1 g total) by mouth 4 (four) times a day, Starting Fri 7/19/2019, Print      TRAZODONE HCL PO Take 150 mg by mouth daily at bedtime  , Historical Med      zolpidem (AMBIEN CR) 12 5 MG CR tablet Take 12 5 mg by mouth daily at bedtime as needed for sleep (unknown dosage), Historical Med           No discharge procedures on file      ED Provider  Electronically Signed by           Marry Lyon PA-C  08/25/19 8441

## 2019-08-25 NOTE — ED NOTES
Hematology resulted initial CMP prior to notifying this nurse of hemolysis  Hematology requested provider order a redraw stating they could not order same d/t having resulted specimen already  Redraw ordered by provider for this reason        Rock How  08/25/19 1529

## 2019-09-30 ENCOUNTER — HOSPITAL ENCOUNTER (EMERGENCY)
Facility: HOSPITAL | Age: 34
Discharge: HOME | End: 2019-09-30
Attending: EMERGENCY MEDICINE | Admitting: EMERGENCY MEDICINE
Payer: COMMERCIAL

## 2019-09-30 ENCOUNTER — APPOINTMENT (EMERGENCY)
Dept: CT IMAGING | Facility: HOSPITAL | Age: 34
End: 2019-09-30
Payer: COMMERCIAL

## 2019-09-30 VITALS
RESPIRATION RATE: 16 BRPM | BODY MASS INDEX: 42.8 KG/M2 | TEMPERATURE: 99.1 F | HEART RATE: 58 BPM | WEIGHT: 234 LBS | DIASTOLIC BLOOD PRESSURE: 59 MMHG | SYSTOLIC BLOOD PRESSURE: 100 MMHG | OXYGEN SATURATION: 98 %

## 2019-09-30 DIAGNOSIS — R10.9 ABDOMINAL PAIN: Primary | ICD-10-CM

## 2019-09-30 DIAGNOSIS — D72.829 LEUKOCYTOSIS: ICD-10-CM

## 2019-09-30 DIAGNOSIS — R11.2 NAUSEA AND VOMITING: ICD-10-CM

## 2019-09-30 LAB
ALBUMIN SERPL BCP-MCNC: 4.8 G/DL (ref 3–5.2)
ALP SERPL-CCNC: 88 U/L (ref 43–122)
ALT SERPL W P-5'-P-CCNC: 10 U/L (ref 9–52)
AMORPH URATE CRY URNS QL MICRO: ABNORMAL /HPF
ANION GAP SERPL CALCULATED.3IONS-SCNC: 9 MMOL/L (ref 5–14)
ANISOCYTOSIS BLD QL SMEAR: PRESENT
APTT PPP: 29 SECONDS (ref 25–32)
AST SERPL W P-5'-P-CCNC: 50 U/L (ref 14–36)
BACTERIA UR QL AUTO: ABNORMAL /HPF
BILIRUB SERPL-MCNC: 1.2 MG/DL
BILIRUB UR QL STRIP: NEGATIVE
BUN SERPL-MCNC: 9 MG/DL (ref 5–25)
CALCIUM SERPL-MCNC: 9.9 MG/DL (ref 8.4–10.2)
CHLORIDE SERPL-SCNC: 107 MMOL/L (ref 97–108)
CLARITY UR: ABNORMAL
CO2 SERPL-SCNC: 22 MMOL/L (ref 22–30)
COLOR UR: ABNORMAL
CREAT SERPL-MCNC: 0.59 MG/DL (ref 0.6–1.2)
ERYTHROCYTE [DISTWIDTH] IN BLOOD BY AUTOMATED COUNT: 19.8 %
EXT PREG TEST URINE: NEGATIVE
EXT. CONTROL ED NAV: NORMAL
GFR SERPL CREATININE-BSD FRML MDRD: 120 ML/MIN/1.73SQ M
GLUCOSE SERPL-MCNC: 110 MG/DL (ref 70–99)
GLUCOSE UR STRIP-MCNC: NEGATIVE MG/DL
HCT VFR BLD AUTO: 41.6 % (ref 36–46)
HGB BLD-MCNC: 13.3 G/DL (ref 12–16)
HGB UR QL STRIP.AUTO: 50
INR PPP: 0.91 (ref 0.91–1.09)
KETONES UR STRIP-MCNC: ABNORMAL MG/DL
LACTATE SERPL-SCNC: 1 MMOL/L (ref 0.7–2)
LEUKOCYTE ESTERASE UR QL STRIP: 25
LIPASE SERPL-CCNC: 43 U/L (ref 23–300)
LYMPHOCYTES # BLD AUTO: 16 % (ref 25–45)
LYMPHOCYTES # BLD AUTO: 2.9 THOUSAND/UL (ref 0.5–4)
MCH RBC QN AUTO: 24.9 PG (ref 26–34)
MCHC RBC AUTO-ENTMCNC: 31.9 G/DL (ref 31–36)
MCV RBC AUTO: 78 FL (ref 80–100)
MICROCYTES BLD QL AUTO: PRESENT
MONOCYTES # BLD AUTO: 1.09 THOUSAND/UL (ref 0.2–0.9)
MONOCYTES NFR BLD AUTO: 6 % (ref 1–10)
NEUTS BAND NFR BLD MANUAL: 2 % (ref 0–8)
NEUTS SEG # BLD: 13.94 THOUSAND/UL (ref 1.8–7.8)
NEUTS SEG NFR BLD AUTO: 75 %
NITRITE UR QL STRIP: NEGATIVE
NON-SQ EPI CELLS URNS QL MICRO: ABNORMAL /HPF
PH UR STRIP.AUTO: 5 [PH]
PLATELET # BLD AUTO: 385 THOUSANDS/UL (ref 150–450)
PLATELET BLD QL SMEAR: ADEQUATE
PMV BLD AUTO: 9 FL (ref 8.9–12.7)
POTASSIUM SERPL-SCNC: 5.6 MMOL/L (ref 3.6–5)
PROT SERPL-MCNC: 9 G/DL (ref 5.9–8.4)
PROT UR STRIP-MCNC: ABNORMAL MG/DL
PROTHROMBIN TIME: 10.1 SECONDS (ref 9.8–12)
RBC # BLD AUTO: 5.32 MILLION/UL (ref 4–5.2)
RBC #/AREA URNS AUTO: ABNORMAL /HPF
RBC MORPH BLD: PRESENT
SODIUM SERPL-SCNC: 138 MMOL/L (ref 137–147)
SP GR UR STRIP.AUTO: 1.02 (ref 1–1.04)
TOTAL CELLS COUNTED SPEC: 100
UROBILINOGEN UA: 1 MG/DL
VARIANT LYMPHS # BLD AUTO: 1 % (ref 0–0)
WBC # BLD AUTO: 18.1 THOUSAND/UL (ref 4.5–11)
WBC #/AREA URNS AUTO: ABNORMAL /HPF

## 2019-09-30 PROCEDURE — 83690 ASSAY OF LIPASE: CPT | Performed by: EMERGENCY MEDICINE

## 2019-09-30 PROCEDURE — 85610 PROTHROMBIN TIME: CPT | Performed by: EMERGENCY MEDICINE

## 2019-09-30 PROCEDURE — 36415 COLL VENOUS BLD VENIPUNCTURE: CPT | Performed by: EMERGENCY MEDICINE

## 2019-09-30 PROCEDURE — 83605 ASSAY OF LACTIC ACID: CPT | Performed by: EMERGENCY MEDICINE

## 2019-09-30 PROCEDURE — 81025 URINE PREGNANCY TEST: CPT | Performed by: EMERGENCY MEDICINE

## 2019-09-30 PROCEDURE — 96375 TX/PRO/DX INJ NEW DRUG ADDON: CPT

## 2019-09-30 PROCEDURE — 85007 BL SMEAR W/DIFF WBC COUNT: CPT | Performed by: EMERGENCY MEDICINE

## 2019-09-30 PROCEDURE — C9113 INJ PANTOPRAZOLE SODIUM, VIA: HCPCS | Performed by: EMERGENCY MEDICINE

## 2019-09-30 PROCEDURE — 85027 COMPLETE CBC AUTOMATED: CPT | Performed by: EMERGENCY MEDICINE

## 2019-09-30 PROCEDURE — 74177 CT ABD & PELVIS W/CONTRAST: CPT

## 2019-09-30 PROCEDURE — 96361 HYDRATE IV INFUSION ADD-ON: CPT

## 2019-09-30 PROCEDURE — 81001 URINALYSIS AUTO W/SCOPE: CPT | Performed by: EMERGENCY MEDICINE

## 2019-09-30 PROCEDURE — 80053 COMPREHEN METABOLIC PANEL: CPT | Performed by: EMERGENCY MEDICINE

## 2019-09-30 PROCEDURE — 99284 EMERGENCY DEPT VISIT MOD MDM: CPT | Performed by: EMERGENCY MEDICINE

## 2019-09-30 PROCEDURE — 85730 THROMBOPLASTIN TIME PARTIAL: CPT | Performed by: EMERGENCY MEDICINE

## 2019-09-30 PROCEDURE — 96374 THER/PROPH/DIAG INJ IV PUSH: CPT

## 2019-09-30 PROCEDURE — 99284 EMERGENCY DEPT VISIT MOD MDM: CPT

## 2019-09-30 RX ORDER — SUCRALFATE ORAL 1 G/10ML
1000 SUSPENSION ORAL ONCE
Status: COMPLETED | OUTPATIENT
Start: 2019-09-30 | End: 2019-09-30

## 2019-09-30 RX ORDER — ONDANSETRON 4 MG/1
4 TABLET, ORALLY DISINTEGRATING ORAL EVERY 8 HOURS PRN
Qty: 20 TABLET | Refills: 0 | Status: SHIPPED | OUTPATIENT
Start: 2019-09-30

## 2019-09-30 RX ORDER — SUCRALFATE ORAL 1 G/10ML
1 SUSPENSION ORAL 4 TIMES DAILY
Qty: 420 ML | Refills: 0 | Status: SHIPPED | OUTPATIENT
Start: 2019-09-30

## 2019-09-30 RX ORDER — ONDANSETRON 2 MG/ML
4 INJECTION INTRAMUSCULAR; INTRAVENOUS ONCE
Status: COMPLETED | OUTPATIENT
Start: 2019-09-30 | End: 2019-09-30

## 2019-09-30 RX ORDER — DICYCLOMINE HCL 20 MG
20 TABLET ORAL ONCE
Status: COMPLETED | OUTPATIENT
Start: 2019-09-30 | End: 2019-09-30

## 2019-09-30 RX ORDER — PANTOPRAZOLE SODIUM 40 MG/1
40 INJECTION, POWDER, FOR SOLUTION INTRAVENOUS ONCE
Status: COMPLETED | OUTPATIENT
Start: 2019-09-30 | End: 2019-09-30

## 2019-09-30 RX ORDER — LIDOCAINE HYDROCHLORIDE 20 MG/ML
15 SOLUTION OROPHARYNGEAL ONCE
Status: COMPLETED | OUTPATIENT
Start: 2019-09-30 | End: 2019-09-30

## 2019-09-30 RX ORDER — MAGNESIUM HYDROXIDE/ALUMINUM HYDROXICE/SIMETHICONE 120; 1200; 1200 MG/30ML; MG/30ML; MG/30ML
15 SUSPENSION ORAL ONCE
Status: COMPLETED | OUTPATIENT
Start: 2019-09-30 | End: 2019-09-30

## 2019-09-30 RX ORDER — PANTOPRAZOLE SODIUM 20 MG/1
20 TABLET, DELAYED RELEASE ORAL DAILY
Qty: 20 TABLET | Refills: 0 | Status: SHIPPED | OUTPATIENT
Start: 2019-09-30

## 2019-09-30 RX ADMIN — SUCRALFATE 1000 MG: 1 SUSPENSION ORAL at 19:11

## 2019-09-30 RX ADMIN — IOHEXOL 50 ML: 240 INJECTION, SOLUTION INTRATHECAL; INTRAVASCULAR; INTRAVENOUS; ORAL at 17:41

## 2019-09-30 RX ADMIN — PANTOPRAZOLE SODIUM 40 MG: 40 INJECTION, POWDER, FOR SOLUTION INTRAVENOUS at 17:21

## 2019-09-30 RX ADMIN — SODIUM CHLORIDE 1000 ML: 0.9 INJECTION, SOLUTION INTRAVENOUS at 17:21

## 2019-09-30 RX ADMIN — ALUMINUM HYDROXIDE, MAGNESIUM HYDROXIDE, AND SIMETHICONE 15 ML: 200; 200; 20 SUSPENSION ORAL at 16:25

## 2019-09-30 RX ADMIN — IOHEXOL 100 ML: 350 INJECTION, SOLUTION INTRAVENOUS at 18:24

## 2019-09-30 RX ADMIN — ONDANSETRON 4 MG: 2 INJECTION INTRAMUSCULAR; INTRAVENOUS at 17:21

## 2019-09-30 RX ADMIN — LIDOCAINE HYDROCHLORIDE 15 ML: 20 SOLUTION ORAL; TOPICAL at 16:25

## 2019-09-30 RX ADMIN — DICYCLOMINE HYDROCHLORIDE 20 MG: 20 TABLET ORAL at 19:11

## 2019-09-30 NOTE — ED PROVIDER NOTES
History  Chief Complaint   Patient presents with    Abdominal Pain     epigastric pain with n/v/d and fever/chills x3 days     68-year-old female presents with complaint of recurrent epigastric pain along with nausea, vomiting, chills  She has a history of this occurring with some regularity  She denies regular use of marijuana stating that she smokes only once a month  She states that she does smoke cigarettes quite regularly and is unsure if this may be involved with her pain  She has not seen her primary care physician or she seen GI for these recurrent complaints  She denies chest pain, shortness of breath, diarrhea, or other acute issues or concerns  The pain is primarily in her epigastric region and is described as a burning sensation  Abdominal Pain   Pain location:  Epigastric  Pain quality: aching and burning    Pain radiates to:  Does not radiate  Pain severity:  Moderate  Onset quality:  Gradual  Timing:  Constant  Progression:  Unchanged  Chronicity:  Recurrent  Relieved by: hot showers  Worsened by:  Nothing  Ineffective treatments:  None tried  Associated symptoms: anorexia, chills, nausea and vomiting    Associated symptoms: no chest pain, no constipation, no cough, no diarrhea, no dysuria, no fatigue, no fever, no flatus, no hematemesis, no hematochezia, no hematuria, no melena, no shortness of breath, no sore throat, no vaginal bleeding and no vaginal discharge        Prior to Admission Medications   Prescriptions Last Dose Informant Patient Reported? Taking?    QUEtiapine Fumarate (SEROQUEL PO)  Self Yes No   Sig: Take 300 mg by mouth daily at bedtime     TRAZODONE HCL PO  Self Yes No   Sig: Take 150 mg by mouth daily at bedtime     acetaminophen (TYLENOL) 500 mg tablet   No No   Sig: Take 1 tablet (500 mg total) by mouth every 6 (six) hours as needed for mild pain, moderate pain, headaches or fever   aluminum-magnesium hydroxide-simethicone (MAALOX) 383-698-16 MG/5ML SUSP   No No Sig: Take 15 mL by mouth 4 (four) times a day (before meals and at bedtime)   buPROPion (WELLBUTRIN SR) 200 MG 12 hr tablet   Yes No   Sig: Take 200 mg by mouth daily   escitalopram (LEXAPRO) 20 mg tablet   Yes No   Sig: Take 5 mg by mouth daily   famotidine (PEPCID) 20 mg tablet   No No   Sig: Take 1 tablet (20 mg total) by mouth 2 (two) times a day   ibuprofen (MOTRIN) 400 mg tablet   No No   Sig: Take 1 tablet (400 mg total) by mouth every 6 (six) hours as needed for mild pain, moderate pain or fever   ondansetron (ZOFRAN) 4 mg tablet   No No   Sig: Take 1 tablet (4 mg total) by mouth every 6 (six) hours   ondansetron (ZOFRAN-ODT) 4 mg disintegrating tablet   No No   Sig: Take 1 tablet (4 mg total) by mouth every 8 (eight) hours as needed for nausea or vomiting for up to 10 days   sucralfate (CARAFATE) 1 g/10 mL suspension   No No   Sig: Take 10 mL (1 g total) by mouth 4 (four) times a day   zolpidem (AMBIEN CR) 12 5 MG CR tablet  Self Yes No   Sig: Take 12 5 mg by mouth daily at bedtime as needed for sleep (unknown dosage)      Facility-Administered Medications: None       Past Medical History:   Diagnosis Date    Anxiety     Bipolar disease, chronic (HCC)     Diabetes mellitus ([facility] [address] )        History reviewed  No pertinent surgical history  History reviewed  No pertinent family history  I have reviewed and agree with the history as documented  Social History     Tobacco Use    Smoking status: Current Every Day Smoker     Packs/day: 0 50    Smokeless tobacco: Never Used   Substance Use Topics    Alcohol use: Never     Frequency: Never    Drug use: Yes     Types: Marijuana     Comment: last used: 06/20/19        Review of Systems   Constitutional: Positive for chills  Negative for appetite change, fatigue and fever  HENT: Negative for postnasal drip, sinus pain, sore throat and trouble swallowing  Eyes: Negative for redness and itching     Respiratory: Negative for cough, chest tightness, shortness of breath and wheezing  Cardiovascular: Negative for chest pain and leg swelling  Gastrointestinal: Positive for abdominal pain, anorexia, nausea and vomiting  Negative for constipation, diarrhea, flatus, hematemesis, hematochezia and melena  Endocrine: Negative  Genitourinary: Negative for difficulty urinating, dysuria, hematuria, vaginal bleeding and vaginal discharge  Musculoskeletal: Negative for back pain and myalgias  Skin: Negative for rash  Allergic/Immunologic: Negative  Neurological: Negative for dizziness, numbness and headaches  Hematological: Negative  Psychiatric/Behavioral: Negative  Physical Exam  Physical Exam   Constitutional: She is oriented to person, place, and time  She appears well-developed and well-nourished  Non-toxic appearance  She does not appear ill  HENT:   Head: Normocephalic and atraumatic  Nose: Nose normal    Mouth/Throat: Oropharynx is clear and moist    Eyes: Pupils are equal, round, and reactive to light  Conjunctivae and EOM are normal    Neck: Normal range of motion  Neck supple  Cardiovascular: Normal rate, regular rhythm and normal heart sounds  Pulmonary/Chest: Effort normal and breath sounds normal  No respiratory distress  She has no wheezes  Abdominal: Soft  Bowel sounds are normal  There is tenderness in the epigastric area  There is no rigidity, no rebound, no guarding, no CVA tenderness, no tenderness at McBurney's point and negative Peña's sign  Musculoskeletal: She exhibits no edema, tenderness or deformity  Neurological: She is alert and oriented to person, place, and time  Skin: Skin is warm and dry  Capillary refill takes less than 2 seconds  No rash noted  Psychiatric: She has a normal mood and affect  Her behavior is normal    Nursing note and vitals reviewed        Vital Signs  ED Triage Vitals   Temperature Pulse Respirations Blood Pressure SpO2   09/30/19 1600 09/30/19 1600 09/30/19 1600 09/30/19 1600 09/30/19 1600   99 1 °F (37 3 °C) 80 16 109/65 98 %      Temp Source Heart Rate Source Patient Position - Orthostatic VS BP Location FiO2 (%)   09/30/19 1600 09/30/19 1600 09/30/19 1600 09/30/19 1600 --   Oral Monitor Lying Left arm       Pain Score       09/30/19 1843       4           Vitals:    09/30/19 1600 09/30/19 1843 09/30/19 1844   BP: 109/65 100/59 100/59   Pulse: 80 58 58   Patient Position - Orthostatic VS: Lying Lying Lying         Visual Acuity      ED Medications  Medications   sucralfate (CARAFATE) oral suspension 1,000 mg (has no administration in time range)   dicyclomine (BENTYL) tablet 20 mg (has no administration in time range)   sodium chloride 0 9 % bolus 1,000 mL (0 mL Intravenous Stopped 9/30/19 1900)   ondansetron (ZOFRAN) injection 4 mg (4 mg Intravenous Given 9/30/19 1721)   aluminum-magnesium hydroxide-simethicone (MYLANTA) 200-200-20 mg/5 mL oral suspension 15 mL (15 mL Oral Given 9/30/19 1625)   Lidocaine Viscous HCl (XYLOCAINE) 2 % mucosal solution 15 mL (15 mL Swish & Spit Given 9/30/19 1625)   pantoprazole (PROTONIX) injection 40 mg (40 mg Intravenous Given 9/30/19 1721)   iohexol (OMNIPAQUE) 240 MG/ML solution 50 mL (50 mL Oral Given 9/30/19 1741)   iohexol (OMNIPAQUE) 240 MG/ML solution **ADS Override Pull** (  Override Pull 9/30/19 1741)   iohexol (OMNIPAQUE) 350 MG/ML injection (MULTI-DOSE) 100 mL (100 mL Intravenous Given 9/30/19 1824)       Diagnostic Studies  Results Reviewed     Procedure Component Value Units Date/Time    Lipase [[id number]]  (Normal) Collected:  09/30/19 1644    Lab Status:  Final result Specimen:  Blood from Arm, Right Updated:  09/30/19 1859     Lipase 43 u/L     Urine Microscopic [[id number]]  (Abnormal) Collected:  09/30/19 1722    Lab Status:  Final result Specimen:  Urine, Other Updated:  09/30/19 1751     RBC, UA 0-1 /hpf      WBC, UA 0-1 /hpf      Epithelial Cells Occasional /hpf      Bacteria, UA Occasional /hpf      AMORPH URATES Innumerable /hpf     Protime-INR [[id number]]  (Normal) Collected:  09/30/19 1715    Lab Status:  Final result Specimen:  Blood from Arm, Left Updated:  09/30/19 1747     Protime 10 1 seconds      INR 0 91    APTT [[id number]]  (Normal) Collected:  09/30/19 1715    Lab Status:  Final result Specimen:  Blood from Arm, Left Updated:  09/30/19 1747     PTT 29 seconds     Lactic acid, plasma x2 [[id number]]  (Normal) Collected:  09/30/19 1715    Lab Status:  Final result Specimen:  Blood from Arm, Left Updated:  09/30/19 1747     LACTIC ACID 1 0 mmol/L     Narrative:       Result may be elevated if tourniquet was used during collection      UA w Reflex to Microscopic [[id number]]  (Abnormal) Collected:  09/30/19 1722    Lab Status:  Final result Specimen:  Urine, Other Updated:  09/30/19 1745     Color, UA Lianna     Clarity, UA Cloudy     Specific Gravity, UA 1 025     pH, UA 5 0     Leukocytes, UA 25 0     Nitrite, UA Negative     Protein, UA 15 (Trace) mg/dl      Glucose, UA Negative mg/dl      Ketones, UA 5 (Trace) mg/dl      Bilirubin, UA Negative     Blood, UA 50 0     UROBILINOGEN UA 1 0 mg/dL     POCT pregnancy, urine [[id number]]  (Normal) Resulted:  09/30/19 1723    Lab Status:  Final result Updated:  09/30/19 1724     EXT PREG TEST UR (Ref: Negative) Negative     Control Valid    Lactic acid, plasma x2 [[id number]]     Lab Status:  No result Specimen:  Blood     Comprehensive metabolic panel [[id number]]  (Abnormal) Collected:  09/30/19 1644    Lab Status:  Final result Specimen:  Blood from Arm, Right Updated:  09/30/19 1702     Sodium 138 mmol/L      Potassium 5 6 mmol/L      Chloride 107 mmol/L      CO2 22 mmol/L      ANION GAP 9 mmol/L      BUN 9 mg/dL      Creatinine 0 59 mg/dL      Glucose 110 mg/dL      Calcium 9 9 mg/dL      AST 50 U/L      ALT 10 U/L      Alkaline Phosphatase 88 U/L      Total Protein 9 0 g/dL      Albumin 4 8 g/dL      Total Bilirubin 1 20 mg/dL      eGFR 120 ml/min/1 73sq m     Narrative: Hemolysis  National Kidney Disease Foundation guidelines for Chronic Kidney Disease (CKD):     Stage 1 with normal or high GFR (GFR > 90 mL/min/1 73 square meters)    Stage 2 Mild CKD (GFR = 60-89 mL/min/1 73 square meters)    Stage 3A Moderate CKD (GFR = 45-59 mL/min/1 73 square meters)    Stage 3B Moderate CKD (GFR = 30-44 mL/min/1 73 square meters)    Stage 4 Severe CKD (GFR = 15-29 mL/min/1 73 square meters)    Stage 5 End Stage CKD (GFR <15 mL/min/1 73 square meters)  Note: GFR calculation is accurate only with a steady state creatinine    CBC and differential [[id number]]  (Abnormal) Collected:  09/30/19 1644    Lab Status:  Final result Specimen:  Blood from Arm, Right Updated:  09/30/19 1653     WBC 18 10 Thousand/uL      RBC 5 32 Million/uL      Hemoglobin 13 3 g/dL      Hematocrit 41 6 %      MCV 78 fL      MCH 24 9 pg      MCHC 31 9 g/dL      RDW 19 8 %      MPV 9 0 fL      Platelets 111 Thousands/uL                  CT abdomen pelvis with contrast   Final Result by Andre Harper MD (09/30 1833)         1  No evidence of bowel obstruction, colitis or diverticulitis  Normal appendix  2   No evidence of pyelonephritis or obstructive uropathy  Workstation performed: [id number]                    Procedures  Procedures       ED Course                               MDM  Number of Diagnoses or Management Options  Abdominal pain:   Leukocytosis:   Nausea and vomiting:   Diagnosis management comments: 42-year-old female presents with complaint of recurrent epigastric pain  She describes a burning sensation along with nausea and vomiting  She has a history of this occurring with some regularity  She has never been evaluated by Gastroenterology  Patient denies use of marijuana or other drugs  After receiving medications IV fluids, her symptoms did improve  She had no recurrence of vomiting while in the department    CT was unremarkable and labs showed an elevated white blood cell count consistent with her description of recurrent episodes of vomiting  She will be following up as an outpatient is aware of reasons return to the ER  Amount and/or Complexity of Data Reviewed  Clinical lab tests: ordered and reviewed  Tests in the radiology section of CPT®: ordered and reviewed  Tests in the medicine section of CPT®: ordered and reviewed  Independent visualization of images, tracings, or specimens: yes        Disposition  Final diagnoses:   Abdominal pain   Nausea and vomiting   Leukocytosis     Time reflects when diagnosis was documented in both MDM as applicable and the Disposition within this note     Time User Action Codes Description Comment    9/30/2019  7:03 PM Satnam Degree Add [R10 9] Abdominal pain     9/30/2019  7:03 PM Satnam Degree Add [R11 2] Nausea and vomiting     9/30/2019  7:03 PM Satnam Degree Add [R47 243] Leukocytosis       ED Disposition     ED Disposition Condition Date/Time Comment    Discharge Stable Mon Sep 30, 2019  7:05 PM Artdeeptiomer Johnathan discharge to home/self care  Follow-up Information     Follow up With Specialties Details Why Contact Info Additional [facility] Call   [address]  [place] [address] [facility] [address] [phone] adrien Tal[facility], [address], [place], [place], [address] [phone]        Follow up with your primary care provider       [facility] Emergency Department Emergency Medicine  If symptoms worsen [address] [address] [phone]           Patient's Medications   Discharge Prescriptions    ONDANSETRON (ZOFRAN-ODT) 4 MG DISINTEGRATING TABLET    Take 1 tablet (4 mg total) by mouth every 8 (eight) hours as needed for nausea or vomiting       Start Date: 9/30/2019 End Date: --       Order Dose: 4 mg       Quantity: 20 tablet    Refills: 0    PANTOPRAZOLE (PROTONIX) 20 MG TABLET    Take 1 tablet (20 mg total) by mouth daily       Start Date: 9/30/2019 End Date: --       Order Dose: 20 mg       Quantity: 20 tablet    Refills: 0    SUCRALFATE (CARAFATE) 1 G/10 ML SUSPENSION    Take 10 mL (1 g total) by mouth 4 (four) times a day       Start Date: 9/30/2019 End Date: --       Order Dose: 1 g       Quantity: 420 mL    Refills: 0     No discharge procedures on file      ED Provider  Electronically Signed by           William Stover DO  09/30/19 2397

## 2019-10-10 ENCOUNTER — HOSPITAL ENCOUNTER (EMERGENCY)
Facility: HOSPITAL | Age: 34
Discharge: LEFT BEFORE DISCHARGE | End: 2019-10-10
Attending: EMERGENCY MEDICINE
Payer: COMMERCIAL

## 2019-10-10 VITALS
HEART RATE: 75 BPM | DIASTOLIC BLOOD PRESSURE: 78 MMHG | SYSTOLIC BLOOD PRESSURE: 121 MMHG | BODY MASS INDEX: 41.57 KG/M2 | OXYGEN SATURATION: 100 % | WEIGHT: 227.29 LBS | RESPIRATION RATE: 18 BRPM | TEMPERATURE: 98.2 F

## 2019-10-10 DIAGNOSIS — R10.13 EPIGASTRIC ABDOMINAL PAIN: Primary | ICD-10-CM

## 2019-10-10 LAB
ALBUMIN SERPL BCP-MCNC: 3.2 G/DL (ref 3.5–5)
ALP SERPL-CCNC: 76 U/L (ref 46–116)
ALT SERPL W P-5'-P-CCNC: 24 U/L (ref 12–78)
ANION GAP SERPL CALCULATED.3IONS-SCNC: 8 MMOL/L (ref 4–13)
AST SERPL W P-5'-P-CCNC: 25 U/L (ref 5–45)
BILIRUB DIRECT SERPL-MCNC: 0.07 MG/DL (ref 0–0.2)
BILIRUB SERPL-MCNC: 0.3 MG/DL (ref 0.2–1)
BUN SERPL-MCNC: 8 MG/DL (ref 5–25)
CALCIUM SERPL-MCNC: 9 MG/DL (ref 8.3–10.1)
CHLORIDE SERPL-SCNC: 108 MMOL/L (ref 100–108)
CO2 SERPL-SCNC: 26 MMOL/L (ref 21–32)
CREAT SERPL-MCNC: 0.63 MG/DL (ref 0.6–1.3)
GFR SERPL CREATININE-BSD FRML MDRD: 117 ML/MIN/1.73SQ M
GLUCOSE SERPL-MCNC: 113 MG/DL (ref 65–140)
LIPASE SERPL-CCNC: 61 U/L (ref 73–393)
MAGNESIUM SERPL-MCNC: 2 MG/DL (ref 1.6–2.6)
POTASSIUM SERPL-SCNC: 4.1 MMOL/L (ref 3.5–5.3)
PROT SERPL-MCNC: 6.5 G/DL (ref 6.4–8.2)
SODIUM SERPL-SCNC: 142 MMOL/L (ref 136–145)

## 2019-10-10 PROCEDURE — 99284 EMERGENCY DEPT VISIT MOD MDM: CPT

## 2019-10-10 PROCEDURE — 96374 THER/PROPH/DIAG INJ IV PUSH: CPT

## 2019-10-10 PROCEDURE — 99285 EMERGENCY DEPT VISIT HI MDM: CPT | Performed by: EMERGENCY MEDICINE

## 2019-10-10 PROCEDURE — 80048 BASIC METABOLIC PNL TOTAL CA: CPT | Performed by: EMERGENCY MEDICINE

## 2019-10-10 PROCEDURE — 96375 TX/PRO/DX INJ NEW DRUG ADDON: CPT

## 2019-10-10 PROCEDURE — 36415 COLL VENOUS BLD VENIPUNCTURE: CPT | Performed by: EMERGENCY MEDICINE

## 2019-10-10 PROCEDURE — 83690 ASSAY OF LIPASE: CPT | Performed by: EMERGENCY MEDICINE

## 2019-10-10 PROCEDURE — 80076 HEPATIC FUNCTION PANEL: CPT | Performed by: EMERGENCY MEDICINE

## 2019-10-10 PROCEDURE — 83735 ASSAY OF MAGNESIUM: CPT | Performed by: EMERGENCY MEDICINE

## 2019-10-10 RX ORDER — ONDANSETRON 2 MG/ML
4 INJECTION INTRAMUSCULAR; INTRAVENOUS ONCE
Status: COMPLETED | OUTPATIENT
Start: 2019-10-10 | End: 2019-10-10

## 2019-10-10 RX ADMIN — FAMOTIDINE 20 MG: 10 INJECTION, SOLUTION INTRAVENOUS at 08:42

## 2019-10-10 RX ADMIN — SODIUM CHLORIDE 1000 ML: 0.9 INJECTION, SOLUTION INTRAVENOUS at 08:41

## 2019-10-10 RX ADMIN — ONDANSETRON 4 MG: 2 INJECTION INTRAMUSCULAR; INTRAVENOUS at 08:42

## 2019-10-10 NOTE — ED NOTES
Pt yelling and screaming obscenities in room at this time; pt arguing with  and son;  Rn instructed pt and visitors that this type of behavior is not acceptable; Rn explained to pt and visitors if this type of behavior continuous visitors will be removed from room; pt and visitors acknowledged instructions     Ny Brown RN  10/10/19 6720

## 2019-10-10 NOTE — ED NOTES
Pt yelling and screaming obscenities in room again; Rn explained that the visitors would have to leave at this time; Pt states " This is how I fucking act at home"  Rn states that this language is still unacceptable  Pt then states that : I am going to rip this fucking IV out I want to get the fuck out of here"  Rn explained to pt not to rip the IV out because of safety reason and explained that the IV can be removed if she wishes; Charge nurse Balbir Wu entered the room to help deescalate the situation; Pt then becomes verbally aggressive with Jace wan stating " Bitch get the fuck out of here"    Rn explained again that staff is here to help; Dr Hallie Harvey enters pt room to explain care to pt     Valeri White RN  10/10/19 9180

## 2019-10-10 NOTE — ED PROVIDER NOTES
History  Chief Complaint   Patient presents with    Epigastric Pain     pt c/o epigastric pain for the past x4 days; pt states she has n/v but denies diarrhea and fevers; pt has a Hx of epigastic pain; pt denioes cp/sob     28 YO female presents with recurrent upper abdominal pain  Pt states pain has been sharp, non-radiating, constant, beginning 4 days prior; this has been associated with nausea and vomiting  Pt denies diarrhea  Pt states Hx of similar, she has been seen multiple times for recurrent abdominal pain  She was previously placed on Protonix and carafate  States she has finished the protonix, has been trying the carafate  Pt states she did have a reduced diet but recently ate "house food"  Pt denies new or different symptoms  Pt denies CP/SOB/F/C/D/C, no dysuria, burning on urination or blood in urine  History provided by:  Patient   used: No    Epigastric Pain   Pain location:  Epigastric  Pain quality: sharp    Pain radiates to:  Does not radiate  Pain severity:  Moderate  Onset quality:  Gradual  Duration:  4 days  Timing:  Constant  Progression:  Waxing and waning  Chronicity:  Recurrent  Context: eating    Relieved by:  Nothing  Exacerbated by: Eating  Ineffective treatments: carafate  Associated symptoms: nausea and vomiting    Associated symptoms: no abdominal pain, no cough, no dizziness, no fatigue, no fever, no shortness of breath and no weakness    Nausea:     Severity:  Moderate    Onset quality:  Gradual    Duration:  4 days    Timing:  Constant    Progression:  Waxing and waning  Vomiting:     Quality:  Stomach contents    Severity:  Moderate    Duration:  4 days    Timing:  Intermittent    Progression:  Unchanged      Prior to Admission Medications   Prescriptions Last Dose Informant Patient Reported? Taking?    QUEtiapine Fumarate (SEROQUEL PO)  Self Yes No   Sig: Take 300 mg by mouth daily at bedtime     TRAZODONE HCL PO  Self Yes No   Sig: Take 150 mg by mouth daily at bedtime     acetaminophen (TYLENOL) 500 mg tablet   No No   Sig: Take 1 tablet (500 mg total) by mouth every 6 (six) hours as needed for mild pain, moderate pain, headaches or fever   aluminum-magnesium hydroxide-simethicone (MAALOX) 200-200-20 MG/5ML SUSP   No No   Sig: Take 15 mL by mouth 4 (four) times a day (before meals and at bedtime)   buPROPion (WELLBUTRIN SR) 200 MG 12 hr tablet   Yes No   Sig: Take 200 mg by mouth daily   escitalopram (LEXAPRO) 20 mg tablet   Yes No   Sig: Take 5 mg by mouth daily   famotidine (PEPCID) 20 mg tablet   No No   Sig: Take 1 tablet (20 mg total) by mouth 2 (two) times a day   ibuprofen (MOTRIN) 400 mg tablet   No No   Sig: Take 1 tablet (400 mg total) by mouth every 6 (six) hours as needed for mild pain, moderate pain or fever   ondansetron (ZOFRAN-ODT) 4 mg disintegrating tablet   No No   Sig: Take 1 tablet (4 mg total) by mouth every 8 (eight) hours as needed for nausea or vomiting   pantoprazole (PROTONIX) 20 mg tablet   No No   Sig: Take 1 tablet (20 mg total) by mouth daily   sucralfate (CARAFATE) 1 g/10 mL suspension   No No   Sig: Take 10 mL (1 g total) by mouth 4 (four) times a day   sucralfate (CARAFATE) 1 g/10 mL suspension   No No   Sig: Take 10 mL (1 g total) by mouth 4 (four) times a day   zolpidem (AMBIEN CR) 12 5 MG CR tablet  Self Yes No   Sig: Take 12 5 mg by mouth daily at bedtime as needed for sleep (unknown dosage)      Facility-Administered Medications: None       Past Medical History:   Diagnosis Date    Anxiety     Bipolar disease, chronic (HCC)        Past Surgical History:   Procedure Laterality Date    NO PAST SURGERIES         History reviewed  No pertinent family history  I have reviewed and agree with the history as documented      Social History     Tobacco Use    Smoking status: Current Every Day Smoker     Packs/day: 0 50    Smokeless tobacco: Never Used   Substance Use Topics    Alcohol use: Never     Frequency: Never    Drug use: Yes     Types: Marijuana     Comment: last used: 06/20/19        Review of Systems   Constitutional: Negative for chills, fatigue and fever  HENT: Negative for dental problem  Eyes: Negative for visual disturbance  Respiratory: Negative for cough and shortness of breath  Cardiovascular: Negative for chest pain  Gastrointestinal: Positive for nausea and vomiting  Negative for abdominal pain and diarrhea  Genitourinary: Negative for dysuria and frequency  Musculoskeletal: Negative for arthralgias  Skin: Negative for rash  Neurological: Negative for dizziness, weakness and light-headedness  Psychiatric/Behavioral: Negative for agitation, behavioral problems and confusion  All other systems reviewed and are negative  Physical Exam  Physical Exam   Constitutional: She is oriented to person, place, and time  She appears well-developed and well-nourished  HENT:   Head: Normocephalic and atraumatic  Eyes: Pupils are equal, round, and reactive to light  EOM are normal    Neck: Normal range of motion  Cardiovascular: Normal rate, regular rhythm and normal heart sounds  Pulmonary/Chest: Effort normal and breath sounds normal    Abdominal: Soft  There is tenderness in the epigastric area  Musculoskeletal: Normal range of motion  Neurological: She is alert and oriented to person, place, and time  Skin: Skin is warm and dry  Psychiatric: She has a normal mood and affect  Her behavior is normal  Thought content normal    Nursing note and vitals reviewed        Vital Signs  ED Triage Vitals [10/10/19 0809]   Temperature Pulse Respirations Blood Pressure SpO2   98 2 °F (36 8 °C) 75 18 121/78 100 %      Temp Source Heart Rate Source Patient Position - Orthostatic VS BP Location FiO2 (%)   Oral Monitor Lying Right arm --      Pain Score       8           Vitals:    10/10/19 0809   BP: 121/78   Pulse: 75   Patient Position - Orthostatic VS: Lying         Visual Acuity      ED Medications  Medications   sodium chloride 0 9 % bolus 1,000 mL (has no administration in time range)   famotidine (PEPCID) injection 20 mg (has no administration in time range)   ondansetron (ZOFRAN) injection 4 mg (has no administration in time range)       Diagnostic Studies  Results Reviewed     Procedure Component Value Units Date/Time    CBC and differential [[id number]]     Lab Status:  No result Specimen:  Blood     Basic metabolic panel [[id number]]     Lab Status:  No result Specimen:  Blood     Hepatic function panel [[id number]]     Lab Status:  No result Specimen:  Blood     Magnesium [[id number]]     Lab Status:  No result Specimen:  Blood     Lipase [[id number]]     Lab Status:  No result Specimen:  Blood     POCT urinalysis dipstick [[id number]]     Lab Status:  No result Specimen:  Urine     POCT pregnancy, urine [[id number]]     Lab Status:  No result                  No orders to display              Procedures  Procedures       ED Course  ED Course as of Oct 10 0905   Thu Oct 10, 2019   0900 Pt screaming in room, using profanity, shouting she is in pain  Spoke with pt regarding her use of profanity and need to calm down as there are other pt's  Did explain her pain was being evaluated and medications have been given for treatment, if she needs more medications they can be offered  Pt states she just wants to leave  Did explain that pt's discomfort has not been fully evaluated and there is a possibility for serious medical issues such as pancreatitis  Did explain that pt can return to the ER at any time and, if she has decided to leave, she can  Pepcid over the counter for treatment of her pain  Pt has decided to leave against medical advice due to evaluation not being completed  She has refused to sign AMA paperwork  Risks were discussed with pt prior to her leaving                                    MDM  Number of Diagnoses or Management Options  Epigastric abdominal pain: new and requires workup  Diagnosis management comments: 1  Epigastric pain - Pt with similar in the past, states no new or different symptoms  Likely represents gastritis  Will check CBC, metabolic panel for electrolyte abnormalities and dehydration,  LFT's to assess GB dysfunction, lipase for pancreatitis, urine for infection  Will treat with IV fluids, Pepcid, antiemetics  Amount and/or Complexity of Data Reviewed  Clinical lab tests: ordered and reviewed  Obtain history from someone other than the patient: yes  Review and summarize past medical records: yes    Patient Progress  Patient progress: other (comment) (Pt left against medical advice )      Disposition  Final diagnoses:   None     ED Disposition     None      Follow-up Information    None         Patient's Medications   Discharge Prescriptions    No medications on file     No discharge procedures on file      ED Provider  Electronically Signed by           Manolo Gillis MD  10/10/19 8668

## 2019-10-10 NOTE — ED NOTES
Yelling heard behind closed curtain of patient room  Primary RN and myself present at pt bedside  Pt screaming, yelling profanities and verbally abusive to staff  Pt stating she has pain and that no one was helping her  Pt was instructed by staff that she had just been medicated for her pain and she had not given the medication time to work  Pt reports "stupid bitch don't be telling me how we can act  I have pain and no one here is doing anything about it " Dr Cindi Huynh present at bedside  Pt continues to remain highly uncooperative  Alley Khan RN  10/10/19 5768    I asked that patients visitors to leave at this time due to patient behavior  Pt escalated more continued to verbally assault staff  Pt states "well then I am just to to leave"  Pt attempting to self remove IV  Dr Cindi Huynh and primary RN remain present at bedside  Pt refuses to sign AMA paper work   IV removed by primary RN, pt ambulates out of department with family continuing to be uncooperative stating " I hate stupid bitches that walk into my room and tell me how to act "      Alley Khan RN  10/10/19 0376

## 2020-01-20 ENCOUNTER — APPOINTMENT (EMERGENCY)
Dept: RADIOLOGY | Facility: HOSPITAL | Age: 35
End: 2020-01-20
Payer: COMMERCIAL

## 2020-01-20 ENCOUNTER — HOSPITAL ENCOUNTER (EMERGENCY)
Facility: HOSPITAL | Age: 35
Discharge: HOME | End: 2020-01-20
Attending: EMERGENCY MEDICINE
Payer: COMMERCIAL

## 2020-01-20 VITALS
DIASTOLIC BLOOD PRESSURE: 63 MMHG | HEART RATE: 86 BPM | BODY MASS INDEX: 38.43 KG/M2 | WEIGHT: 210.1 LBS | OXYGEN SATURATION: 99 % | SYSTOLIC BLOOD PRESSURE: 134 MMHG | RESPIRATION RATE: 18 BRPM | TEMPERATURE: 97.5 F
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
## 2020-01-20 DIAGNOSIS — M54.50 ACUTE BILATERAL LOW BACK PAIN WITHOUT SCIATICA: ICD-10-CM

## 2020-01-20 DIAGNOSIS — R07.89 CHEST WALL PAIN: ICD-10-CM

## 2020-01-20 LAB
EXT PREG TEST URINE: NEGATIVE
EXT. CONTROL ED NAV: NORMAL

## 2020-01-20 PROCEDURE — 99284 EMERGENCY DEPT VISIT MOD MDM: CPT

## 2020-01-20 PROCEDURE — 96372 THER/PROPH/DIAG INJ SC/IM: CPT

## 2020-01-20 PROCEDURE — 71046 X-RAY EXAM CHEST 2 VIEWS: CPT

## 2020-01-20 PROCEDURE — 72100 X-RAY EXAM L-S SPINE 2/3 VWS: CPT

## 2020-01-20 PROCEDURE — 81025 URINE PREGNANCY TEST: CPT | Performed by: PHYSICIAN ASSISTANT

## 2020-01-20 PROCEDURE — 99284 EMERGENCY DEPT VISIT MOD MDM: CPT | Performed by: PHYSICIAN ASSISTANT

## 2020-01-20 RX ORDER — DIAZEPAM 5 MG/ML
5 INJECTION, SOLUTION INTRAMUSCULAR; INTRAVENOUS ONCE
Status: COMPLETED | OUTPATIENT
Start: 2020-01-20 | End: 2020-01-20

## 2020-01-20 RX ORDER — METHOCARBAMOL 500 MG/1
500 TABLET, FILM COATED ORAL EVERY 12 HOURS PRN
Qty: 14 TABLET | Refills: 0 | Status: SHIPPED | OUTPATIENT
Start: 2020-01-20

## 2020-01-20 RX ORDER — LIDOCAINE 50 MG/G
2 PATCH TOPICAL ONCE
Status: DISCONTINUED | OUTPATIENT
Start: 2020-01-20 | End: 2020-01-20 | Stop reason: HOSPADM

## 2020-01-20 RX ORDER — KETOROLAC TROMETHAMINE 30 MG/ML
15 INJECTION, SOLUTION INTRAMUSCULAR; INTRAVENOUS ONCE
Status: COMPLETED | OUTPATIENT
Start: 2020-01-20 | End: 2020-01-20

## 2020-01-20 RX ADMIN — KETOROLAC TROMETHAMINE 15 MG: 30 INJECTION, SOLUTION INTRAMUSCULAR at 09:01

## 2020-01-20 RX ADMIN — LIDOCAINE 2 PATCH: 50 PATCH TOPICAL at 09:03

## 2020-01-20 RX ADMIN — DIAZEPAM 5 MG: 10 INJECTION, SOLUTION INTRAMUSCULAR; INTRAVENOUS at 09:02

## 2020-01-20 NOTE — ED PROVIDER NOTES
History  Chief Complaint   Patient presents with    Fall     Pt  reports she fell 2 days ago in the shower and is c/o lower back pain and pain in chest where she struck the tub   Back Pain     Patient is a 40-year-old female with history of anxiety and bipolar, presents emergency department for evaluation of fall  Patient states 2 days ago, she slipped in the shower, falling  Patient denies head injury or loss consciousness  Patient states she hit her anterior chest and twisted her lower back  Patient states she took 1 Tylenol yesterday without relief of pain  Patient states pain worse with movement  Patient denies fever, chills, bladder/bowel incontinence, urinary retention, perianal numbness, radiation of pain down legs, dysuria, hematuria, shortness of breath, vomiting, leg swelling/pain  Prior to Admission Medications   Prescriptions Last Dose Informant Patient Reported? Taking?    QUEtiapine Fumarate (SEROQUEL PO)  Self Yes No   Sig: Take 300 mg by mouth daily at bedtime     TRAZODONE HCL PO  Self Yes No   Sig: Take 150 mg by mouth daily at bedtime     acetaminophen (TYLENOL) 500 mg tablet   No No   Sig: Take 1 tablet (500 mg total) by mouth every 6 (six) hours as needed for mild pain, moderate pain, headaches or fever   aluminum-magnesium hydroxide-simethicone (MAALOX) 200-200-20 MG/5ML SUSP   No No   Sig: Take 15 mL by mouth 4 (four) times a day (before meals and at bedtime)   buPROPion (WELLBUTRIN SR) 200 MG 12 hr tablet   Yes No   Sig: Take 200 mg by mouth daily   escitalopram (LEXAPRO) 20 mg tablet   Yes No   Sig: Take 5 mg by mouth daily   famotidine (PEPCID) 20 mg tablet   No No   Sig: Take 1 tablet (20 mg total) by mouth 2 (two) times a day   ibuprofen (MOTRIN) 400 mg tablet   No No   Sig: Take 1 tablet (400 mg total) by mouth every 6 (six) hours as needed for mild pain, moderate pain or fever   ondansetron (ZOFRAN-ODT) 4 mg disintegrating tablet   No No   Sig: Take 1 tablet (4 mg total) by mouth every 8 (eight) hours as needed for nausea or vomiting   pantoprazole (PROTONIX) 20 mg tablet   No No   Sig: Take 1 tablet (20 mg total) by mouth daily   sucralfate (CARAFATE) 1 g/10 mL suspension   No No   Sig: Take 10 mL (1 g total) by mouth 4 (four) times a day   sucralfate (CARAFATE) 1 g/10 mL suspension   No No   Sig: Take 10 mL (1 g total) by mouth 4 (four) times a day   zolpidem (AMBIEN CR) 12 5 MG CR tablet  Self Yes No   Sig: Take 12 5 mg by mouth daily at bedtime as needed for sleep (unknown dosage)      Facility-Administered Medications: None       Past Medical History:   Diagnosis Date    Anxiety     Bipolar disease, chronic (HCC)        Past Surgical History:   Procedure Laterality Date    NO PAST SURGERIES         History reviewed  No pertinent family history  I have reviewed and agree with the history as documented  Social History     Tobacco Use    Smoking status: Current Every Day Smoker     Packs/day: 0 50    Smokeless tobacco: Never Used   Substance Use Topics    Alcohol use: Never     Frequency: Never    Drug use: Yes     Types: Marijuana     Comment: last used: 06/20/19        Review of Systems   Constitutional: Negative for chills and fever  HENT: Negative for ear pain and sore throat  Eyes: Negative for redness  Respiratory: Negative for chest tightness and shortness of breath  Cardiovascular: Positive for chest pain  Negative for palpitations and leg swelling  Gastrointestinal: Negative for abdominal pain, diarrhea, nausea and vomiting  Genitourinary: Negative for dysuria and hematuria  Musculoskeletal: Positive for back pain  Negative for neck pain  Skin: Negative for rash  Neurological: Negative for weakness and headaches  Psychiatric/Behavioral: Negative for confusion  Physical Exam  Physical Exam   Constitutional: She is oriented to person, place, and time  She appears well-developed and well-nourished  Non-toxic appearance   She does not have a sickly appearance  She does not appear ill  No distress  HENT:   Head: Normocephalic and atraumatic  Right Ear: External ear normal    Left Ear: External ear normal    Nose: Nose normal    Mouth/Throat: Uvula is midline, oropharynx is clear and moist and mucous membranes are normal    Eyes: Pupils are equal, round, and reactive to light  Conjunctivae and EOM are normal    Neck: Normal range of motion  Neck supple  Cardiovascular: Normal rate and regular rhythm  Pulmonary/Chest: Effort normal and breath sounds normal  No stridor  No tachypnea and no bradypnea  No respiratory distress  She has no decreased breath sounds  She has no wheezes  She has no rhonchi  She has no rales  Chest wall is not dull to percussion  She exhibits tenderness (Diffuse)  She exhibits no laceration, no crepitus, no edema, no deformity, no swelling and no retraction  Musculoskeletal:        Cervical back: Normal         Thoracic back: Normal         Lumbar back: She exhibits decreased range of motion (Secondary to pain), tenderness (Bilateral paraspinal) and bony tenderness  Right lower leg: She exhibits no tenderness, no swelling and no edema  Left lower leg: She exhibits no tenderness, no swelling and no edema  Neurological: She is alert and oriented to person, place, and time  She has normal strength and normal reflexes  No sensory deficit  Gait normal  GCS eye subscore is 4  GCS verbal subscore is 5  GCS motor subscore is 6  Reflex Scores:       Patellar reflexes are 2+ on the right side and 2+ on the left side  Skin: Skin is warm and dry  Capillary refill takes less than 2 seconds  No rash noted  Psychiatric: She has a normal mood and affect   Her behavior is normal        Vital Signs  ED Triage Vitals [01/20/20 0813]   Temperature Pulse Respirations Blood Pressure SpO2   97 5 °F (36 4 °C) 86 18 134/63 99 %      Temp Source Heart Rate Source Patient Position - Orthostatic VS BP Location FiO2 (%) Oral -- -- -- --      Pain Score       9           Vitals:    01/20/20 0813   BP: 134/63   Pulse: 86         Visual Acuity      ED Medications  Medications   ketorolac (TORADOL) injection 15 mg (15 mg Intramuscular Given 1/20/20 0901)   diazepam (VALIUM) injection 5 mg (5 mg Intramuscular Given 1/20/20 0902)       Diagnostic Studies  Results Reviewed     Procedure Component Value Units Date/Time    POCT pregnancy, urine [[id number]]  (Normal) Resulted:  01/20/20 0900    Lab Status:  Final result Updated:  01/20/20 0900     EXT PREG TEST UR (Ref: Negative) negative     Control valid                 XR chest 2 views   Final Result by Sergio Arriaga MD (01/20 5944)      No acute trauma  Subtle patchy opacity in the right midlung and small nodule in the left base, most conspicuous on the soft tissue images, likely infectious or inflammatory  The study was marked in [facility] for immediate notification  Workstation performed: [id number]         XR lumbar spine 2 or 3 views   Final Result by Marta Paige MD (01/20 8025)      Normal examination  Workstation performed: [id number]                    Procedures  Procedures         ED Course  ED Course as of Jan 20 1615   Mon Jan 20, 2020   0830 Patient unable to provide urine sample at this time                                  MDM  Number of Diagnoses or Management Options  Acute bilateral low back pain without sciatica:   Chest wall pain:   Fall, initial encounter:   Diagnosis management comments: Patient is a 54-year-old female with history of anxiety and bipolar, presents emergency department for evaluation of fall  Patient states 2 days ago, she slipped in the shower, falling  Patient denies head injury or loss consciousness  Patient states she hit her anterior chest and twisted her lower back  Toradol and Valium given in emergency department  Patient without any red flag symptoms at this time    Patient denies fever, chills, IVDA, bladder/bowel incontinence, urinary tension, perianal numbness  Rx for Robaxin and diclofenac gel sent to patient's pharmacy  Information for orthopedics provided to patient if symptoms do not improve  Advised patient to follow-up with her primary care physician for re-evaluation of fall  Pt verbalizes understanding and agrees with plan  The management plan was discussed in detail with the patient at bedside and all questions were answered  Prior to discharge, I provided both verbal and written instructions  I discussed with the patient the signs and symptoms for which to return to the emergency department  All questions were answered and patient was comfortable with the plan of care and discharged to home  The patient verbalized understanding of our discussion and plan of care, and agrees to return to the Emergency Department for concerns and progression of illness  Disposition  Final diagnoses:   Fall, initial encounter   Acute bilateral low back pain without sciatica   Chest wall pain     Time reflects when diagnosis was documented in both MDM as applicable and the Disposition within this note     Time User Action Codes Description Comment    1/20/2020  9:24 AM Payton [place] Add [Y57  Irma Grates Fall, initial encounter     1/20/2020  9:24 AM Payton [place] Add [M54 5] Acute bilateral low back pain without sciatica     1/20/2020  9:24 AM Sheran [place] Add [R07 89] Chest wall pain       ED Disposition     ED Disposition Condition Date/Time Comment    Discharge Stable Mon Jan 20, 2020  9:28 AM Cari Bhatia discharge to home/self care              Follow-up Information     Follow up With Specialties Details Why Contact Info Additional [address] [facility] [place] [facility] Go to  If symptoms worsen [address] [facility]  [place] [address] [address]  [address] [place] [address], [address] [facility], [address] [facility], [place], [place], [address]   [facility], [address]  [address] [address]  [phone]             Discharge Medication List as of 1/20/2020  9:29 AM      START taking these medications    Details   diclofenac sodium (VOLTAREN) 1 % Apply 2 g topically 4 (four) times a day, Starting Mon 1/20/2020, Normal      methocarbamol (ROBAXIN) 500 mg tablet Take 1 tablet (500 mg total) by mouth every 12 (twelve) hours as needed for muscle spasms, Starting Mon 1/20/2020, Normal         CONTINUE these medications which have NOT CHANGED    Details   acetaminophen (TYLENOL) 500 mg tablet Take 1 tablet (500 mg total) by mouth every 6 (six) hours as needed for mild pain, moderate pain, headaches or fever, Starting Sat 10/13/2018, Normal      aluminum-magnesium hydroxide-simethicone (MAALOX) 200-200-20 MG/5ML SUSP Take 15 mL by mouth 4 (four) times a day (before meals and at bedtime), Starting Sun 5/19/2019, Print      buPROPion (WELLBUTRIN SR) 200 MG 12 hr tablet Take 200 mg by mouth daily, Historical Med      escitalopram (LEXAPRO) 20 mg tablet Take 5 mg by mouth daily, Historical Med      famotidine (PEPCID) 20 mg tablet Take 1 tablet (20 mg total) by mouth 2 (two) times a day, Starting Fri 7/19/2019, Print      ibuprofen (MOTRIN) 400 mg tablet Take 1 tablet (400 mg total) by mouth every 6 (six) hours as needed for mild pain, moderate pain or fever, Starting Sat 10/13/2018, Normal      ondansetron (ZOFRAN-ODT) 4 mg disintegrating tablet Take 1 tablet (4 mg total) by mouth every 8 (eight) hours as needed for nausea or vomiting, Starting Mon 9/30/2019, Print      pantoprazole (PROTONIX) 20 mg tablet Take 1 tablet (20 mg total) by mouth daily, Starting Mon 9/30/2019, Print      QUEtiapine Fumarate (SEROQUEL PO) Take 300 mg by mouth daily at bedtime  , Historical Med      !! sucralfate (CARAFATE) 1 g/10 mL suspension Take 10 mL (1 g total) by mouth 4 (four) times a day, Starting Fri 7/19/2019, Print      !! sucralfate (CARAFATE) 1 g/10 mL suspension Take 10 mL (1 g total) by mouth 4 (four) times a day, Starting Mon 9/30/2019, Print      TRAZODONE HCL PO Take 150 mg by mouth daily at bedtime  , Historical Med      zolpidem (AMBIEN CR) 12 5 MG CR tablet Take 12 5 mg by mouth daily at bedtime as needed for sleep (unknown dosage), Historical Med       !! - Potential duplicate medications found  Please discuss with provider  No discharge procedures on file      ED Provider  Electronically Signed by           Kelsy Esparza PA-C  01/20/20 6282

## 2020-01-20 NOTE — ED NOTES
Patient staets unable to provide urine sample without something to drink  Patient given drink  Tolerating PO fluids   Patient ambulatory in room with steady gait     Erlinda Seay RN  01/20/20 1764

## 2020-02-28 ENCOUNTER — HOSPITAL ENCOUNTER (EMERGENCY)
Facility: HOSPITAL | Age: 35
Discharge: HOME | End: 2020-02-28
Attending: EMERGENCY MEDICINE | Admitting: EMERGENCY MEDICINE
Payer: COMMERCIAL

## 2020-02-28 ENCOUNTER — APPOINTMENT (EMERGENCY)
Dept: RADIOLOGY | Facility: HOSPITAL | Age: 35
End: 2020-02-28
Payer: COMMERCIAL

## 2020-02-28 VITALS
BODY MASS INDEX: 39.52 KG/M2 | RESPIRATION RATE: 18 BRPM | OXYGEN SATURATION: 100 % | DIASTOLIC BLOOD PRESSURE: 87 MMHG | WEIGHT: 216.05 LBS | HEART RATE: 86 BPM | TEMPERATURE: 98.2 F | SYSTOLIC BLOOD PRESSURE: 129 MMHG

## 2020-02-28 DIAGNOSIS — J11.1 INFLUENZA-LIKE ILLNESS: Primary | ICD-10-CM

## 2020-02-28 DIAGNOSIS — H92.01 RIGHT EAR PAIN: ICD-10-CM

## 2020-02-28 PROCEDURE — 99283 EMERGENCY DEPT VISIT LOW MDM: CPT

## 2020-02-28 PROCEDURE — 99284 EMERGENCY DEPT VISIT MOD MDM: CPT | Performed by: EMERGENCY MEDICINE

## 2020-02-28 PROCEDURE — 71046 X-RAY EXAM CHEST 2 VIEWS: CPT

## 2020-02-28 RX ORDER — ONDANSETRON 4 MG/1
4 TABLET, ORALLY DISINTEGRATING ORAL ONCE
Status: COMPLETED | OUTPATIENT
Start: 2020-02-28 | End: 2020-02-28

## 2020-02-28 RX ORDER — ALBUTEROL SULFATE 90 UG/1
2 AEROSOL, METERED RESPIRATORY (INHALATION) EVERY 4 HOURS PRN
Qty: 1 INHALER | Refills: 0 | Status: SHIPPED | OUTPATIENT
Start: 2020-02-28

## 2020-02-28 RX ORDER — ACETAMINOPHEN 325 MG/1
975 TABLET ORAL ONCE
Status: COMPLETED | OUTPATIENT
Start: 2020-02-28 | End: 2020-02-28

## 2020-02-28 RX ORDER — IBUPROFEN 600 MG/1
600 TABLET ORAL EVERY 6 HOURS PRN
Qty: 30 TABLET | Refills: 0 | Status: SHIPPED | OUTPATIENT
Start: 2020-02-28

## 2020-02-28 RX ORDER — ACETAMINOPHEN 325 MG/1
975 TABLET ORAL EVERY 6 HOURS PRN
Qty: 30 TABLET | Refills: 0 | Status: SHIPPED | OUTPATIENT
Start: 2020-02-28

## 2020-02-28 RX ORDER — ONDANSETRON 4 MG/1
4 TABLET, FILM COATED ORAL EVERY 6 HOURS
Qty: 12 TABLET | Refills: 0 | Status: SHIPPED | OUTPATIENT
Start: 2020-02-28

## 2020-02-28 RX ADMIN — ONDANSETRON 4 MG: 4 TABLET, ORALLY DISINTEGRATING ORAL at 11:49

## 2020-02-28 RX ADMIN — ACETAMINOPHEN 975 MG: 325 TABLET ORAL at 11:49

## 2020-02-28 NOTE — ED PROVIDER NOTES
History  Chief Complaint   Patient presents with    Flu Symptoms     x 3 days  No tylenol or ibuprofen today  Flu Symptoms   Presenting symptoms: cough, fatigue, fever, myalgias, nausea and rhinorrhea    Presenting symptoms: no diarrhea, no shortness of breath, no sore throat and no vomiting    Severity:  Moderate  Onset quality:  Gradual  Duration:  3 days  Progression:  Worsening  Chronicity:  New  Relieved by:  Nothing  Worsened by:  Nothing  Ineffective treatments:  OTC medications  Associated symptoms: chills, decreased appetite, ear pain and nasal congestion    Associated symptoms: no mental status change, no neck stiffness and no syncope        Prior to Admission Medications   Prescriptions Last Dose Informant Patient Reported? Taking?    QUEtiapine Fumarate (SEROQUEL PO)  Self Yes No   Sig: Take 300 mg by mouth daily at bedtime     TRAZODONE HCL PO  Self Yes No   Sig: Take 150 mg by mouth daily at bedtime     aluminum-magnesium hydroxide-simethicone (MAALOX) 200-200-20 MG/5ML SUSP   No No   Sig: Take 15 mL by mouth 4 (four) times a day (before meals and at bedtime)   buPROPion (WELLBUTRIN SR) 200 MG 12 hr tablet   Yes No   Sig: Take 200 mg by mouth daily   diclofenac sodium (VOLTAREN) 1 %   No No   Sig: Apply 2 g topically 4 (four) times a day   escitalopram (LEXAPRO) 20 mg tablet   Yes No   Sig: Take 5 mg by mouth daily   famotidine (PEPCID) 20 mg tablet   No No   Sig: Take 1 tablet (20 mg total) by mouth 2 (two) times a day   ibuprofen (MOTRIN) 400 mg tablet   No No   Sig: Take 1 tablet (400 mg total) by mouth every 6 (six) hours as needed for mild pain, moderate pain or fever   methocarbamol (ROBAXIN) 500 mg tablet   No No   Sig: Take 1 tablet (500 mg total) by mouth every 12 (twelve) hours as needed for muscle spasms   ondansetron (ZOFRAN-ODT) 4 mg disintegrating tablet   No No   Sig: Take 1 tablet (4 mg total) by mouth every 8 (eight) hours as needed for nausea or vomiting   pantoprazole (PROTONIX) 20 mg tablet   No No   Sig: Take 1 tablet (20 mg total) by mouth daily   sucralfate (CARAFATE) 1 g/10 mL suspension   No No   Sig: Take 10 mL (1 g total) by mouth 4 (four) times a day   sucralfate (CARAFATE) 1 g/10 mL suspension   No No   Sig: Take 10 mL (1 g total) by mouth 4 (four) times a day   zolpidem (AMBIEN CR) 12 5 MG CR tablet  Self Yes No   Sig: Take 12 5 mg by mouth daily at bedtime as needed for sleep (unknown dosage)      Facility-Administered Medications: None       Past Medical History:   Diagnosis Date    Anxiety     Bipolar disease, chronic (HCC)        Past Surgical History:   Procedure Laterality Date    NO PAST SURGERIES         History reviewed  No pertinent family history  I have reviewed and agree with the history as documented  E-Cigarette/Vaping     E-Cigarette/Vaping Substances     Social History     Tobacco Use    Smoking status: Current Every Day Smoker     Packs/day: 0 50    Smokeless tobacco: Never Used   Substance Use Topics    Alcohol use: Never     Frequency: Never    Drug use: Yes     Types: Marijuana     Comment: last used: 06/20/19       Review of Systems   Constitutional: Positive for chills, decreased appetite, fatigue and fever  HENT: Positive for congestion, ear pain and rhinorrhea  Negative for sore throat, trouble swallowing and voice change  Eyes: Negative  Negative for pain and visual disturbance  Respiratory: Positive for cough  Negative for shortness of breath and wheezing  Cardiovascular: Negative  Negative for chest pain and palpitations  Gastrointestinal: Positive for nausea  Negative for abdominal pain, diarrhea and vomiting  Genitourinary: Negative  Negative for dysuria and frequency  Musculoskeletal: Positive for myalgias  Negative for neck pain and neck stiffness  Skin: Negative  Negative for rash  Neurological: Negative  Negative for dizziness, speech difficulty, weakness, light-headedness and numbness  Physical Exam  Physical Exam   Constitutional: She is oriented to person, place, and time  She appears well-developed and well-nourished  No distress  HENT:   Head: Normocephalic and atraumatic  Right Ear: Hearing, external ear and ear canal normal  No tenderness  No mastoid tenderness  Tympanic membrane is not injected, not perforated, not erythematous, not retracted and not bulging  A middle ear effusion is present  No hemotympanum  Left Ear: Hearing, tympanic membrane, external ear and ear canal normal    Mouth/Throat: Oropharynx is clear and moist    Eyes: Pupils are equal, round, and reactive to light  Conjunctivae and EOM are normal    Neck: Normal range of motion  Neck supple  No tracheal deviation present  Cardiovascular: Normal rate, regular rhythm and intact distal pulses  Pulmonary/Chest: Effort normal and breath sounds normal  No respiratory distress  She has no wheezes  She has no rales  Abdominal: Soft  Bowel sounds are normal  She exhibits no distension  There is no tenderness  There is no rebound and no guarding  Musculoskeletal: Normal range of motion  She exhibits no tenderness or deformity  Neurological: She is alert and oriented to person, place, and time  Skin: Skin is warm and dry  Capillary refill takes less than 2 seconds  No rash noted  Psychiatric: She has a normal mood and affect  Her behavior is normal    Nursing note and vitals reviewed        Vital Signs  ED Triage Vitals   Temperature Pulse Respirations Blood Pressure SpO2   02/28/20 1103 02/28/20 1103 02/28/20 1103 02/28/20 1103 02/28/20 1103   98 2 °F (36 8 °C) 86 18 129/87 100 %      Temp Source Heart Rate Source Patient Position - Orthostatic VS BP Location FiO2 (%)   02/28/20 1103 02/28/20 1103 02/28/20 1103 02/28/20 1103 --   Tympanic Monitor Sitting Left arm       Pain Score       02/28/20 1149       Worst Possible Pain           Vitals:    02/28/20 1103   BP: 129/87   Pulse: 86   Patient Position - Orthostatic VS: Sitting         Visual Acuity      ED Medications  Medications   acetaminophen (TYLENOL) tablet 975 mg (975 mg Oral Given 2/28/20 1149)   ondansetron (ZOFRAN-ODT) dispersible tablet 4 mg (4 mg Oral Given 2/28/20 1149)       Diagnostic Studies  Results Reviewed     None                 XR chest 2 views   ED Interpretation by Kelley Carrington DO (02/28 1150)   No acute cardiopulmonary disease  Procedures  Procedures         ED Course                               MDM  Number of Diagnoses or Management Options  Influenza-like illness:   Right ear pain:   Diagnosis management comments: 66-year-old female presenting for concerns of influenza like illness  She notes that she has 4 kids who are sick at home with similar symptoms  She is outside the window for treatment with Tamiflu for influenza, will hold on testing here in the emergency room  Symptomatic management was initiated  Patient felt better after Tylenol in the ER  Chest x-ray was negative  Her vitals remained stable and she was ambulating without difficulty evidence of hypoxia  I have reviewed all the patient's pertinent blood work, imaging results and other testing and their evaluation here in the emergency room  They verbalized understanding of the testing today and have no further questions or concerns regarding the care here in the emergency room  He will follow up with her primary care physician as well as with any specialist in their discharge instructions  Strict return precautions were discussed         Amount and/or Complexity of Data Reviewed  Tests in the radiology section of CPT®: ordered and reviewed          Disposition  Final diagnoses:   Influenza-like illness   Right ear pain     Time reflects when diagnosis was documented in both MDM as applicable and the Disposition within this note     Time User Action Codes Description Comment    2/28/2020 11:22 AM Veronique Don illness     2/28/2020 11:22 AM Mortimer Crosby Add [H92 01] Right ear pain       ED Disposition     ED Disposition Condition Date/Time Comment    Discharge Stable Fri Feb 28, 2020 11:22 AM Ruben Mann discharge to home/self care  Follow-up Information     Follow up With Specialties Details Why Contact Info Additional [address] In 1 week  [address], [address] [facility] [address]  [address], [address], [address] [place], [place], [address]          Discharge Medication List as of 2/28/2020 11:52 AM      START taking these medications    Details   acetaminophen (TYLENOL) 325 mg tablet Take 3 tablets (975 mg total) by mouth every 6 (six) hours as needed for mild pain or fever, Starting Fri 2/28/2020, Normal      albuterol (PROVENTIL HFA,VENTOLIN HFA) 90 mcg/act inhaler Inhale 2 puffs every 4 (four) hours as needed for wheezing, Starting Fri 2/28/2020, Normal      !! ibuprofen (MOTRIN) 600 mg tablet Take 1 tablet (600 mg total) by mouth every 6 (six) hours as needed for mild pain, Starting Fri 2/28/2020, Normal      ondansetron (ZOFRAN) 4 mg tablet Take 1 tablet (4 mg total) by mouth every 6 (six) hours, Starting Fri 2/28/2020, Normal       !! - Potential duplicate medications found  Please discuss with provider        CONTINUE these medications which have NOT CHANGED    Details   aluminum-magnesium hydroxide-simethicone (MAALOX) 200-200-20 MG/5ML SUSP Take 15 mL by mouth 4 (four) times a day (before meals and at bedtime), Starting Sun 5/19/2019, Print      buPROPion (WELLBUTRIN SR) 200 MG 12 hr tablet Take 200 mg by mouth daily, Historical Med      diclofenac sodium (VOLTAREN) 1 % Apply 2 g topically 4 (four) times a day, Starting Mon 1/20/2020, Normal      escitalopram (LEXAPRO) 20 mg tablet Take 5 mg by mouth daily, Historical Med      famotidine (PEPCID) 20 mg tablet Take 1 tablet (20 mg total) by mouth 2 (two) times a day, Starting Fri 7/19/2019, Print      !! ibuprofen (MOTRIN) 400 mg tablet Take 1 tablet (400 mg total) by mouth every 6 (six) hours as needed for mild pain, moderate pain or fever, Starting Sat 10/13/2018, Normal      methocarbamol (ROBAXIN) 500 mg tablet Take 1 tablet (500 mg total) by mouth every 12 (twelve) hours as needed for muscle spasms, Starting Mon 1/20/2020, Normal      ondansetron (ZOFRAN-ODT) 4 mg disintegrating tablet Take 1 tablet (4 mg total) by mouth every 8 (eight) hours as needed for nausea or vomiting, Starting Mon 9/30/2019, Print      pantoprazole (PROTONIX) 20 mg tablet Take 1 tablet (20 mg total) by mouth daily, Starting Mon 9/30/2019, Print      QUEtiapine Fumarate (SEROQUEL PO) Take 300 mg by mouth daily at bedtime  , Historical Med      !! sucralfate (CARAFATE) 1 g/10 mL suspension Take 10 mL (1 g total) by mouth 4 (four) times a day, Starting Fri 7/19/2019, Print      !! sucralfate (CARAFATE) 1 g/10 mL suspension Take 10 mL (1 g total) by mouth 4 (four) times a day, Starting Mon 9/30/2019, Print      TRAZODONE HCL PO Take 150 mg by mouth daily at bedtime  , Historical Med      zolpidem (AMBIEN CR) 12 5 MG CR tablet Take 12 5 mg by mouth daily at bedtime as needed for sleep (unknown dosage), Historical Med       !! - Potential duplicate medications found  Please discuss with provider  No discharge procedures on file      PDMP Review     None          ED Provider  Electronically Signed by           Rachell Ordoñez DO  02/28/20 5534

## 2020-09-07 ENCOUNTER — HOSPITAL ENCOUNTER (EMERGENCY)
Facility: HOSPITAL | Age: 35
Discharge: HOME | End: 2020-09-07
Attending: EMERGENCY MEDICINE | Admitting: EMERGENCY MEDICINE
Payer: COMMERCIAL

## 2020-09-07 VITALS
TEMPERATURE: 98.3 F | WEIGHT: 247.14 LBS | OXYGEN SATURATION: 97 % | SYSTOLIC BLOOD PRESSURE: 138 MMHG | DIASTOLIC BLOOD PRESSURE: 62 MMHG | BODY MASS INDEX: 45.2 KG/M2 | HEART RATE: 87 BPM | RESPIRATION RATE: 18 BRPM

## 2020-09-07 DIAGNOSIS — J45.901 ACUTE ASTHMA EXACERBATION: Primary | ICD-10-CM

## 2020-09-07 LAB
EXT PREG TEST URINE: NEGATIVE
EXT. CONTROL ED NAV: NORMAL

## 2020-09-07 PROCEDURE — 99283 EMERGENCY DEPT VISIT LOW MDM: CPT

## 2020-09-07 PROCEDURE — 94640 AIRWAY INHALATION TREATMENT: CPT

## 2020-09-07 PROCEDURE — 99284 EMERGENCY DEPT VISIT MOD MDM: CPT | Performed by: PHYSICIAN ASSISTANT

## 2020-09-07 PROCEDURE — 96372 THER/PROPH/DIAG INJ SC/IM: CPT

## 2020-09-07 PROCEDURE — 81025 URINE PREGNANCY TEST: CPT | Performed by: PHYSICIAN ASSISTANT

## 2020-09-07 RX ORDER — SODIUM CHLORIDE FOR INHALATION 0.9 %
3 VIAL, NEBULIZER (ML) INHALATION ONCE
Status: COMPLETED | OUTPATIENT
Start: 2020-09-07 | End: 2020-09-07

## 2020-09-07 RX ORDER — PREDNISONE 20 MG/1
60 TABLET ORAL DAILY
Qty: 15 TABLET | Refills: 0 | Status: SHIPPED | OUTPATIENT
Start: 2020-09-07 | End: 2020-09-12

## 2020-09-07 RX ORDER — KETOROLAC TROMETHAMINE 30 MG/ML
15 INJECTION, SOLUTION INTRAMUSCULAR; INTRAVENOUS ONCE
Status: COMPLETED | OUTPATIENT
Start: 2020-09-07 | End: 2020-09-07

## 2020-09-07 RX ORDER — LORATADINE 10 MG/1
10 TABLET ORAL DAILY
Qty: 30 TABLET | Refills: 0 | Status: SHIPPED | OUTPATIENT
Start: 2020-09-07

## 2020-09-07 RX ORDER — PREDNISONE 20 MG/1
60 TABLET ORAL ONCE
Status: COMPLETED | OUTPATIENT
Start: 2020-09-07 | End: 2020-09-07

## 2020-09-07 RX ORDER — ALBUTEROL SULFATE 90 UG/1
2 AEROSOL, METERED RESPIRATORY (INHALATION) EVERY 4 HOURS PRN
Qty: 1 INHALER | Refills: 0 | Status: SHIPPED | OUTPATIENT
Start: 2020-09-07

## 2020-09-07 RX ADMIN — ISODIUM CHLORIDE 3 ML: 0.03 SOLUTION RESPIRATORY (INHALATION) at 12:02

## 2020-09-07 RX ADMIN — IPRATROPIUM BROMIDE 1 MG: 0.5 SOLUTION RESPIRATORY (INHALATION) at 13:22

## 2020-09-07 RX ADMIN — ALBUTEROL SULFATE 10 MG: 2.5 SOLUTION RESPIRATORY (INHALATION) at 13:22

## 2020-09-07 RX ADMIN — KETOROLAC TROMETHAMINE 15 MG: 30 INJECTION, SOLUTION INTRAMUSCULAR; INTRAVENOUS at 12:02

## 2020-09-07 RX ADMIN — ISODIUM CHLORIDE 3 ML: 0.03 SOLUTION RESPIRATORY (INHALATION) at 13:22

## 2020-09-07 RX ADMIN — PREDNISONE 60 MG: 20 TABLET ORAL at 12:04

## 2020-09-07 RX ADMIN — IPRATROPIUM BROMIDE 1 MG: 0.5 SOLUTION RESPIRATORY (INHALATION) at 12:02

## 2020-09-07 RX ADMIN — ALBUTEROL SULFATE 10 MG: 2.5 SOLUTION RESPIRATORY (INHALATION) at 12:02

## 2020-09-07 NOTE — Clinical Note
KEITH GRACIA accompanied Contreras Muir to the emergency department on 9/7/2020  Return date if applicable: 37/15/9403        If you have any questions or concerns, please don't hesitate to call        Rani Jon PA-C

## 2020-09-07 NOTE — Clinical Note
Sony Gonzalez was seen and treated in our emergency department on 9/7/2020  Diagnosis:     Ruben    She may return on this date: If you have any questions or concerns, please don't hesitate to call        [address] [place] [address]    ______________________________           _______________          _______________  Hospital Representative                              Date                                Time

## 2020-09-07 NOTE — ED PROVIDER NOTES
History  Chief Complaint   Patient presents with    Asthma     hx asthma feels tight and feverish with a headache     Patient is a 28year old asthmatic female, reports previous admissions to both medicine and ICU floors for her asthma  Patient denies intubations for asthma  Reports her asthma has been getting worse over the past few days reports she has chest tightness shortness of breath and headache  Patient reports chills no measured fevers at home  Patient denies any abdominal pain nausea vomiting diarrhea contact with COVID-19 but the patient's  Patient reports no relief with her albuterol home reports he is not on any steroids reports she has not seen a pulmonologist for her asthma  Patient reports this asthma attack feels similar to previous times receive needed admission to the medicine floor  Patient reports she has been on BiPAP before and declines this today reporting she feels she does not need it  History provided by:  Patient   used: No    Asthma   Location:  Lungs  Quality:  Wheezing  Severity:  Moderate  Onset quality:  Gradual  Duration:  3 days  Timing:  Constant  Progression:  Worsening  Context:  Only albuterol at home  Relieved by:  None  Worsened by:  Exertion  Ineffective treatments:  Albuterol  Associated symptoms: fatigue, headaches, shortness of breath and wheezing    Associated symptoms: no abdominal pain, no chest pain, no congestion, no ear pain, no fever, no nausea, no rash, no rhinorrhea, no sore throat and no vomiting        Prior to Admission Medications   Prescriptions Last Dose Informant Patient Reported? Taking?    QUEtiapine Fumarate (SEROQUEL PO)  Self Yes No   Sig: Take 300 mg by mouth daily at bedtime     TRAZODONE HCL PO  Self Yes No   Sig: Take 150 mg by mouth daily at bedtime     acetaminophen (TYLENOL) 325 mg tablet   No No   Sig: Take 3 tablets (975 mg total) by mouth every 6 (six) hours as needed for mild pain or fever   albuterol (5 mg/mL) 0 5 % nebulizer solution   No No   Sig: Take 0 5 mL (2 5 mg total) by nebulization every 6 (six) hours as needed for wheezing   albuterol (PROVENTIL HFA,VENTOLIN HFA) 90 mcg/act inhaler   No No   Sig: Inhale 2 puffs every 4 (four) hours as needed for wheezing   aluminum-magnesium hydroxide-simethicone (MAALOX) 200-200-20 MG/5ML SUSP   No No   Sig: Take 15 mL by mouth 4 (four) times a day (before meals and at bedtime)   buPROPion (WELLBUTRIN SR) 200 MG 12 hr tablet   Yes No   Sig: Take 200 mg by mouth daily   diclofenac sodium (VOLTAREN) 1 %   No No   Sig: Apply 2 g topically 4 (four) times a day   escitalopram (LEXAPRO) 20 mg tablet   Yes No   Sig: Take 5 mg by mouth daily   famotidine (PEPCID) 20 mg tablet   No No   Sig: Take 1 tablet (20 mg total) by mouth 2 (two) times a day   ibuprofen (MOTRIN) 400 mg tablet   No No   Sig: Take 1 tablet (400 mg total) by mouth every 6 (six) hours as needed for mild pain, moderate pain or fever   ibuprofen (MOTRIN) 600 mg tablet   No No   Sig: Take 1 tablet (600 mg total) by mouth every 6 (six) hours as needed for mild pain   methocarbamol (ROBAXIN) 500 mg tablet   No No   Sig: Take 1 tablet (500 mg total) by mouth every 12 (twelve) hours as needed for muscle spasms   ondansetron (ZOFRAN) 4 mg tablet   No No   Sig: Take 1 tablet (4 mg total) by mouth every 6 (six) hours   ondansetron (ZOFRAN-ODT) 4 mg disintegrating tablet   No No   Sig: Take 1 tablet (4 mg total) by mouth every 8 (eight) hours as needed for nausea or vomiting   pantoprazole (PROTONIX) 20 mg tablet   No No   Sig: Take 1 tablet (20 mg total) by mouth daily   sucralfate (CARAFATE) 1 g/10 mL suspension   No No   Sig: Take 10 mL (1 g total) by mouth 4 (four) times a day   sucralfate (CARAFATE) 1 g/10 mL suspension   No No   Sig: Take 10 mL (1 g total) by mouth 4 (four) times a day   zolpidem (AMBIEN CR) 12 5 MG CR tablet  Self Yes No   Sig: Take 12 5 mg by mouth daily at bedtime as needed for sleep (unknown dosage) Facility-Administered Medications: None       Past Medical History:   Diagnosis Date    Anxiety     Bipolar disease, chronic ([facility] [address] )        Past Surgical History:   Procedure Laterality Date    NO PAST SURGERIES         History reviewed  No pertinent family history  I have reviewed and agree with the history as documented  E-Cigarette/Vaping     E-Cigarette/Vaping Substances     Social History     Tobacco Use    Smoking status: Current Every Day Smoker     Packs/day: 0 50    Smokeless tobacco: Never Used   Substance Use Topics    Alcohol use: Never     Frequency: Never    Drug use: Yes     Types: Marijuana     Comment: last used: 06/20/19       Review of Systems   Constitutional: Positive for fatigue  Negative for chills and fever  HENT: Negative for congestion, ear pain, rhinorrhea and sore throat  Eyes: Negative for redness  Respiratory: Positive for chest tightness, shortness of breath and wheezing  Cardiovascular: Negative for chest pain and palpitations  Gastrointestinal: Negative for abdominal pain, nausea and vomiting  Genitourinary: Negative for dysuria and hematuria  Musculoskeletal: Negative  Skin: Negative for rash  Neurological: Positive for headaches  Negative for dizziness, syncope, light-headedness and numbness  Physical Exam  Physical Exam  Vitals signs and nursing note reviewed  Constitutional:       Appearance: She is well-developed  HENT:      Head: Normocephalic  Eyes:      General: No scleral icterus  Cardiovascular:      Rate and Rhythm: Normal rate and regular rhythm  Pulmonary:      Effort: Pulmonary effort is normal       Breath sounds: No stridor  Wheezing ( Expiratory all lung fields) present  Comments: Patient speaking in full sentences with wheezing noted on exhalation  Patient with no try potting or accessory muscle use  Abdominal:      General: There is no distension  Palpations: Abdomen is soft  Tenderness:  There is no abdominal tenderness  Musculoskeletal: Normal range of motion  Skin:     General: Skin is warm and dry  Capillary Refill: Capillary refill takes less than 2 seconds  Neurological:      Mental Status: She is alert and oriented to person, place, and time           Vital Signs  ED Triage Vitals [09/07/20 1005]   Temperature Pulse Respirations Blood Pressure SpO2   98 3 °F (36 8 °C) 97 16 114/77 98 %      Temp Source Heart Rate Source Patient Position - Orthostatic VS BP Location FiO2 (%)   Tympanic Monitor Sitting Left arm --      Pain Score       --           Vitals:    09/07/20 1005 09/07/20 1325   BP: 114/77 138/62   Pulse: 97 87   Patient Position - Orthostatic VS: Sitting Lying         Visual Acuity      ED Medications  Medications   albuterol inhalation solution 10 mg (10 mg Nebulization Given 9/7/20 1202)   ipratropium (ATROVENT) 0 02 % inhalation solution 1 mg (1 mg Nebulization Given 9/7/20 1202)   sodium chloride 0 9 % inhalation solution 3 mL (3 mL Nebulization Given 9/7/20 1202)   predniSONE tablet 60 mg (60 mg Oral Given 9/7/20 1204)   ketorolac (TORADOL) injection 15 mg (15 mg Intramuscular Given 9/7/20 1202)   albuterol inhalation solution 10 mg (10 mg Nebulization Given 9/7/20 1322)   ipratropium (ATROVENT) 0 02 % inhalation solution 1 mg (1 mg Nebulization Given 9/7/20 1322)   sodium chloride 0 9 % inhalation solution 3 mL (3 mL Nebulization Given 9/7/20 1322)       Diagnostic Studies  Results Reviewed     Procedure Component Value Units Date/Time    POCT pregnancy, urine [[id number]]  (Normal) Resulted:  09/07/20 1200    Lab Status:  Final result Updated:  09/07/20 1209     EXT PREG TEST UR (Ref: Negative) NEGATIVE     Control VALID                 No orders to display              Procedures  Procedures         ED Course  ED Course as of Sep 07 1415   [facility] Sep 07, 2020   1250 Patient re-evaluated after hour long albuterol treatment patient continuing to have wheezing noted throughout all lung fields  Patient reports shee feels better however feels as though she would benefit for more albuterol  Another hour long treatment ordered      1351 Patient is re-evaluated at this time and has significant decrease in wheezing  Patient reports she feels significantly improved in her symptoms and would like to go home  Will allow patient to finish the last bit of liquid in her albuterol nebulizer and will prescribe albuterol for her home nebulizer as well as an albuterol pump, Claritin and steroids over the next 5 days for the patient  Patient was reexamined at this time and was found to be stable for discharge  Return to emergency department criteria was reviewed with the patient who verbalized understanding and was agreeable to discharge and the treatment plan at this time  US AUDIT      Most Recent Value   Initial Alcohol Screen: US AUDIT-C    1  How often do you have a drink containing alcohol?  0 Filed at: 09/07/2020 1141   2  How many drinks containing alcohol do you have on a typical day you are drinking? 0 Filed at: 09/07/2020 1141   3b  FEMALE Any Age, or MALE 65+: How often do you have 4 or more drinks on one occassion? 0 Filed at: 09/07/2020 1141   Audit-C Score  0 Filed at: 09/07/2020 1141                  JONNIE/DAST-10      Most Recent Value   How many times in the past year have you    Used an illegal drug or used a prescription medication for non-medical reasons? Never Filed at: 09/07/2020 1141                                MDM  Number of Diagnoses or Management Options  Acute asthma exacerbation:   Diagnosis management comments: All imaging and/or lab testing discussed with patient, strict return to ED precautions discussed  Patient and/or family members verbalizes understanding and agrees with plan  Patient is stable for discharge     Portions of the record may have been created with voice recognition software   Occasional wrong word or "sound a like" substitutions may have occurred due to the inherent limitations of voice recognition software  Read the chart carefully and recognize, using context, where substitutions have occurred  Disposition  Final diagnoses:   Acute asthma exacerbation     Time reflects when diagnosis was documented in both MDM as applicable and the Disposition within this note     Time User Action Codes Description Comment    9/7/2020  1:57 PM Santy Stuart, Jamie Uribe Acute asthma exacerbation       ED Disposition     ED Disposition Condition Date/Time Comment    Discharge Stable Mon Sep 7, 2020  1:56 PM Ruben TINSLEYEASTONAIMEE discharge to home/self care              Follow-up Information     Follow up With Specialties Details Why Morteza Becerra MD Family Medicine Schedule an appointment as soon as possible for a visit   [facility] [facility] [address]             Patient's Medications   Discharge Prescriptions    ALBUTEROL (5 MG/ML) 0 5 % NEBULIZER SOLUTION    Take 0 5 mL (2 5 mg total) by nebulization every 6 (six) hours as needed for wheezing       Start Date: 9/7/2020  End Date: --       Order Dose: 2 5 mg       Quantity: 20 mL    Refills: 0    ALBUTEROL (PROVENTIL HFA,VENTOLIN HFA) 90 MCG/ACT INHALER    Inhale 2 puffs every 4 (four) hours as needed for wheezing       Start Date: 9/7/2020  End Date: --       Order Dose: 2 puffs       Quantity: 1 Inhaler    Refills: 0    IPRATROPIUM (ATROVENT) 0 02 % NEBULIZER SOLUTION    Take 1 vial (0 5 mg total) by nebulization 4 (four) times a day       Start Date: 9/7/2020  End Date: --       Order Dose: 0 5 mg       Quantity: 50 vial    Refills: 0    LORATADINE (CLARITIN) 10 MG TABLET    Take 1 tablet (10 mg total) by mouth daily       Start Date: 9/7/2020  End Date: --       Order Dose: 10 mg       Quantity: 30 tablet    Refills: 0    PREDNISONE 20 MG TABLET    Take 3 tablets (60 mg total) by mouth daily for 5 days       Start Date: 9/7/2020  End Date: 9/12/2020 Order Dose: 60 mg       Quantity: 15 tablet    Refills: 0     No discharge procedures on file      PDMP Review     None          ED Provider  Electronically Signed by           Luciano Banks PA-C  09/07/20 3569

## 2020-12-20 ENCOUNTER — HOSPITAL ENCOUNTER (EMERGENCY)
Facility: HOSPITAL | Age: 35
Discharge: HOME | End: 2020-12-20
Attending: EMERGENCY MEDICINE | Admitting: EMERGENCY MEDICINE
Payer: COMMERCIAL

## 2020-12-20 VITALS
SYSTOLIC BLOOD PRESSURE: 112 MMHG | HEART RATE: 86 BPM | BODY MASS INDEX: 46.41 KG/M2 | OXYGEN SATURATION: 99 % | WEIGHT: 253.75 LBS | RESPIRATION RATE: 20 BRPM | DIASTOLIC BLOOD PRESSURE: 69 MMHG | TEMPERATURE: 97.2 F

## 2020-12-20 DIAGNOSIS — R05.9 COUGH: ICD-10-CM

## 2020-12-20 DIAGNOSIS — Z20.822 SUSPECTED COVID-19 VIRUS INFECTION: Primary | ICD-10-CM

## 2020-12-20 DIAGNOSIS — M79.10 MYALGIA: ICD-10-CM

## 2020-12-20 PROCEDURE — 87637 SARSCOV2&INF A&B&RSV AMP PRB: CPT | Performed by: PHYSICIAN ASSISTANT

## 2020-12-20 PROCEDURE — 99284 EMERGENCY DEPT VISIT MOD MDM: CPT | Performed by: PHYSICIAN ASSISTANT

## 2020-12-20 PROCEDURE — 99283 EMERGENCY DEPT VISIT LOW MDM: CPT

## 2020-12-20 RX ORDER — MULTIVITAMIN
1 CAPSULE ORAL DAILY
Qty: 7 CAPSULE | Refills: 0 | Status: SHIPPED | OUTPATIENT
Start: 2020-12-20 | End: 2020-12-27

## 2020-12-20 RX ORDER — MELATONIN
2000 DAILY
Qty: 14 TABLET | Refills: 0 | Status: SHIPPED | OUTPATIENT
Start: 2020-12-20 | End: 2020-12-27

## 2020-12-20 RX ORDER — GUAIFENESIN 100 MG/5ML
200 SOLUTION ORAL ONCE
Status: COMPLETED | OUTPATIENT
Start: 2020-12-20 | End: 2020-12-20

## 2020-12-20 RX ORDER — MULTIVIT WITH MINERALS/LUTEIN
1000 TABLET ORAL 2 TIMES DAILY
Qty: 14 TABLET | Refills: 0 | Status: SHIPPED | OUTPATIENT
Start: 2020-12-20 | End: 2020-12-27

## 2020-12-20 RX ORDER — BENZONATATE 100 MG/1
100 CAPSULE ORAL EVERY 8 HOURS
Qty: 21 CAPSULE | Refills: 0 | Status: SHIPPED | OUTPATIENT
Start: 2020-12-20

## 2020-12-20 RX ADMIN — GUAIFENESIN 200 MG: 100 SOLUTION ORAL at 11:52

## 2020-12-22 LAB
FLUAV RNA NPH QL NAA+PROBE: NOT DETECTED
FLUBV RNA NPH QL NAA+PROBE: NOT DETECTED
RSV RNA NPH QL NAA+PROBE: NOT DETECTED
SARS-COV-2 RNA NPH QL NAA+PROBE: DETECTED

## 2021-10-19 ENCOUNTER — HOSPITAL ENCOUNTER (EMERGENCY)
Facility: HOSPITAL | Age: 36
Discharge: HOME | End: 2021-10-19
Attending: EMERGENCY MEDICINE
Payer: COMMERCIAL

## 2021-10-19 VITALS
HEART RATE: 96 BPM | RESPIRATION RATE: 16 BRPM | TEMPERATURE: 97.7 F | DIASTOLIC BLOOD PRESSURE: 79 MMHG | SYSTOLIC BLOOD PRESSURE: 133 MMHG | WEIGHT: 237.66 LBS | OXYGEN SATURATION: 99 % | BODY MASS INDEX: 43.47 KG/M2

## 2021-10-19 DIAGNOSIS — J40 BRONCHITIS: ICD-10-CM

## 2021-10-19 DIAGNOSIS — H66.90 OTITIS MEDIA: Primary | ICD-10-CM

## 2021-10-19 DIAGNOSIS — Z20.822 ENCOUNTER FOR LABORATORY TESTING FOR COVID-19 VIRUS: ICD-10-CM

## 2021-10-19 LAB
FLUAV RNA RESP QL NAA+PROBE: NEGATIVE
FLUBV RNA RESP QL NAA+PROBE: NEGATIVE
RSV RNA RESP QL NAA+PROBE: NEGATIVE
SARS-COV-2 RNA RESP QL NAA+PROBE: NEGATIVE

## 2021-10-19 PROCEDURE — 99283 EMERGENCY DEPT VISIT LOW MDM: CPT

## 2021-10-19 PROCEDURE — 0241U HB NFCT DS VIR RESP RNA 4 TRGT: CPT | Performed by: PHYSICIAN ASSISTANT

## 2021-10-19 PROCEDURE — 99284 EMERGENCY DEPT VISIT MOD MDM: CPT | Performed by: PHYSICIAN ASSISTANT

## 2021-10-19 RX ORDER — ALBUTEROL SULFATE 2.5 MG/3ML
2.5 SOLUTION RESPIRATORY (INHALATION) EVERY 6 HOURS PRN
Qty: 120 ML | Refills: 0 | Status: SHIPPED | OUTPATIENT
Start: 2021-10-19

## 2021-10-19 RX ORDER — ALBUTEROL SULFATE 90 UG/1
2 AEROSOL, METERED RESPIRATORY (INHALATION) EVERY 6 HOURS PRN
Qty: 8.5 G | Refills: 0 | Status: SHIPPED | OUTPATIENT
Start: 2021-10-19

## 2021-10-19 RX ORDER — AZITHROMYCIN 250 MG/1
TABLET, FILM COATED ORAL
Qty: 6 TABLET | Refills: 0 | Status: SHIPPED | OUTPATIENT
Start: 2021-10-19 | End: 2021-10-23

## 2022-06-30 ENCOUNTER — HOSPITAL ENCOUNTER (EMERGENCY)
Facility: HOSPITAL | Age: 37
Discharge: HOME | End: 2022-06-30
Attending: EMERGENCY MEDICINE
Payer: COMMERCIAL

## 2022-06-30 VITALS
SYSTOLIC BLOOD PRESSURE: 115 MMHG | HEART RATE: 103 BPM | WEIGHT: 265.65 LBS | OXYGEN SATURATION: 98 % | HEIGHT: 62 IN | BODY MASS INDEX: 48.89 KG/M2 | TEMPERATURE: 98.9 F | DIASTOLIC BLOOD PRESSURE: 81 MMHG | RESPIRATION RATE: 20 BRPM

## 2022-06-30 DIAGNOSIS — M54.50 ACUTE EXACERBATION OF CHRONIC LOW BACK PAIN: Primary | ICD-10-CM

## 2022-06-30 DIAGNOSIS — G89.29 ACUTE EXACERBATION OF CHRONIC LOW BACK PAIN: Primary | ICD-10-CM

## 2022-06-30 PROCEDURE — 96372 THER/PROPH/DIAG INJ SC/IM: CPT

## 2022-06-30 PROCEDURE — 99284 EMERGENCY DEPT VISIT MOD MDM: CPT | Performed by: EMERGENCY MEDICINE

## 2022-06-30 PROCEDURE — 99283 EMERGENCY DEPT VISIT LOW MDM: CPT

## 2022-06-30 RX ORDER — METHOCARBAMOL 500 MG/1
500 TABLET, FILM COATED ORAL ONCE
Status: COMPLETED | OUTPATIENT
Start: 2022-06-30 | End: 2022-06-30

## 2022-06-30 RX ORDER — ACETAMINOPHEN 325 MG/1
975 TABLET ORAL ONCE
Status: COMPLETED | OUTPATIENT
Start: 2022-06-30 | End: 2022-06-30

## 2022-06-30 RX ORDER — LIDOCAINE 50 MG/G
1 PATCH TOPICAL ONCE
Status: DISCONTINUED | OUTPATIENT
Start: 2022-06-30 | End: 2022-06-30 | Stop reason: HOSPADM

## 2022-06-30 RX ORDER — KETOROLAC TROMETHAMINE 30 MG/ML
30 INJECTION, SOLUTION INTRAMUSCULAR; INTRAVENOUS ONCE
Status: COMPLETED | OUTPATIENT
Start: 2022-06-30 | End: 2022-06-30

## 2022-06-30 RX ADMIN — KETOROLAC TROMETHAMINE 30 MG: 30 INJECTION, SOLUTION INTRAMUSCULAR; INTRAVENOUS at 19:27

## 2022-06-30 RX ADMIN — METHOCARBAMOL 500 MG: 500 TABLET ORAL at 19:26

## 2022-06-30 RX ADMIN — LIDOCAINE 1 PATCH: 50 PATCH CUTANEOUS at 19:27

## 2022-06-30 RX ADMIN — ACETAMINOPHEN 975 MG: 325 TABLET, FILM COATED ORAL at 19:26

## 2022-06-30 NOTE — ED NOTES
Pt states she goes to PT for pain & it only makes pain worse - last PT was 2 weeks ago    Took her gabapentin & tramadol for pain today but not helping the back pain & b/l knee pain     Pete Linares RN  06/30/22 2673

## 2022-06-30 NOTE — ED PROVIDER NOTES
History  Chief Complaint   Patient presents with    Back Pain     Pt c/o back pain  Pt states hx of pain  Pt states BL knee pain  Pt states unable to tolerate pain at this time  Pt c/o allergic reaction on her face  Pt c/o chills last night       History provided by:  Patient   used: No    Back Pain  Location:  Lumbar spine  Quality:  Aching  Radiates to:  Does not radiate  Pain severity:  Moderate  Pain is:  Same all the time  Onset quality:  Gradual  Timing:  Intermittent  Progression:  Waxing and waning  Chronicity:  Chronic  Context comment:  Lumbar and throacic disc disease, arthritis  Relieved by:  Nothing  Worsened by:  Nothing  Ineffective treatments:  None tried  Associated symptoms: no abdominal pain, no bladder incontinence, no bowel incontinence, no chest pain, no dysuria, no fever, no headaches, no numbness, no paresthesias, no perianal numbness and no weakness    Risk factors: obesity    Risk factors comment:  Lumbar disc disease, Chronic back pain      Prior to Admission Medications   Prescriptions Last Dose Informant Patient Reported? Taking?    Ascorbic Acid (vitamin C) 1000 MG tablet   No No   Sig: Take 1 tablet (1,000 mg total) by mouth 2 (two) times a day for 7 days   Multiple Vitamin (multivitamin) capsule   No No   Sig: Take 1 capsule by mouth daily for 7 days   QUEtiapine Fumarate (SEROQUEL PO)  Self Yes No   Sig: Take 300 mg by mouth daily at bedtime     TRAZODONE HCL PO  Self Yes No   Sig: Take 150 mg by mouth daily at bedtime     acetaminophen (TYLENOL) 325 mg tablet   No No   Sig: Take 3 tablets (975 mg total) by mouth every 6 (six) hours as needed for mild pain or fever   albuterol (2 5 mg/3 mL) 0 083 % nebulizer solution   No No   Sig: Take 3 mL (2 5 mg total) by nebulization every 6 (six) hours as needed for wheezing or shortness of breath   albuterol (5 mg/mL) 0 5 % nebulizer solution   No No   Sig: Take 0 5 mL (2 5 mg total) by nebulization every 6 (six) hours as needed for wheezing   albuterol (5 mg/mL) 0 5 % nebulizer solution   No No   Sig: Take 0 5 mL (2 5 mg total) by nebulization every 6 (six) hours as needed for wheezing   albuterol (PROVENTIL HFA,VENTOLIN HFA) 90 mcg/act inhaler   No No   Sig: Inhale 2 puffs every 4 (four) hours as needed for wheezing   albuterol (PROVENTIL HFA,VENTOLIN HFA) 90 mcg/act inhaler   No No   Sig: Inhale 2 puffs every 4 (four) hours as needed for wheezing   albuterol (ProAir HFA) 90 mcg/act inhaler   No No   Sig: Inhale 2 puffs every 6 (six) hours as needed for wheezing   aluminum-magnesium hydroxide-simethicone (MAALOX) 200-200-20 MG/5ML SUSP   No No   Sig: Take 15 mL by mouth 4 (four) times a day (before meals and at bedtime)   benzonatate (TESSALON PERLES) 100 mg capsule   No No   Sig: Take 1 capsule (100 mg total) by mouth every 8 (eight) hours   buPROPion (WELLBUTRIN SR) 200 MG 12 hr tablet   Yes No   Sig: Take 200 mg by mouth daily   cholecalciferol (VITAMIN D3) 1,000 units tablet   No No   Sig: Take 2 tablets (2,000 Units total) by mouth daily for 7 days   diclofenac sodium (VOLTAREN) 1 %   No No   Sig: Apply 2 g topically 4 (four) times a day   escitalopram (LEXAPRO) 20 mg tablet   Yes No   Sig: Take 5 mg by mouth daily   famotidine (PEPCID) 20 mg tablet   No No   Sig: Take 1 tablet (20 mg total) by mouth 2 (two) times a day   guaiFENesin (ROBITUSSIN) 100 MG/5ML oral liquid   No No   Sig: Take 5-10 mL (100-200 mg total) by mouth every 4 (four) hours as needed for cough   ibuprofen (MOTRIN) 400 mg tablet   No No   Sig: Take 1 tablet (400 mg total) by mouth every 6 (six) hours as needed for mild pain, moderate pain or fever   ibuprofen (MOTRIN) 600 mg tablet   No No   Sig: Take 1 tablet (600 mg total) by mouth every 6 (six) hours as needed for mild pain   ipratropium (ATROVENT) 0 02 % nebulizer solution   No No   Sig: Take 1 vial (0 5 mg total) by nebulization 4 (four) times a day   loratadine (CLARITIN) 10 mg tablet   No No   Sig: Take 1 tablet (10 mg total) by mouth daily   methocarbamol (ROBAXIN) 500 mg tablet   No No   Sig: Take 1 tablet (500 mg total) by mouth every 12 (twelve) hours as needed for muscle spasms   ondansetron (ZOFRAN) 4 mg tablet   No No   Sig: Take 1 tablet (4 mg total) by mouth every 6 (six) hours   ondansetron (ZOFRAN-ODT) 4 mg disintegrating tablet   No No   Sig: Take 1 tablet (4 mg total) by mouth every 8 (eight) hours as needed for nausea or vomiting   pantoprazole (PROTONIX) 20 mg tablet   No No   Sig: Take 1 tablet (20 mg total) by mouth daily   sucralfate (CARAFATE) 1 g/10 mL suspension   No No   Sig: Take 10 mL (1 g total) by mouth 4 (four) times a day   sucralfate (CARAFATE) 1 g/10 mL suspension   No No   Sig: Take 10 mL (1 g total) by mouth 4 (four) times a day   zolpidem (AMBIEN CR) 12 5 MG CR tablet  Self Yes No   Sig: Take 12 5 mg by mouth daily at bedtime as needed for sleep (unknown dosage)      Facility-Administered Medications: None       Past Medical History:   Diagnosis Date    Anxiety     Bipolar disease, chronic (HCC)        Past Surgical History:   Procedure Laterality Date    NO PAST SURGERIES         History reviewed  No pertinent family history  I have reviewed and agree with the history as documented  E-Cigarette/Vaping    E-Cigarette Use Never User      E-Cigarette/Vaping Substances     Social History     Tobacco Use    Smoking status: Current Every Day Smoker     Packs/day: 0 50    Smokeless tobacco: Never Used   Vaping Use    Vaping Use: Never used   Substance Use Topics    Alcohol use: Never    Drug use: Yes     Types: Marijuana     Comment: rare       Review of Systems   Constitutional: Negative for chills and fever  HENT: Negative for facial swelling, sore throat and trouble swallowing  Eyes: Negative for pain and visual disturbance  Respiratory: Negative for cough and shortness of breath  Cardiovascular: Negative for chest pain and leg swelling     Gastrointestinal: Negative for abdominal pain, bowel incontinence, diarrhea, nausea and vomiting  Genitourinary: Negative for bladder incontinence, dysuria and flank pain  Musculoskeletal: Positive for back pain  Negative for neck pain and neck stiffness  Skin: Negative for pallor and rash  Allergic/Immunologic: Negative for environmental allergies and immunocompromised state  Neurological: Negative for dizziness, weakness, numbness, headaches and paresthesias  Hematological: Negative for adenopathy  Does not bruise/bleed easily  Psychiatric/Behavioral: Negative for agitation and behavioral problems  All other systems reviewed and are negative  Physical Exam  Physical Exam  Vitals and nursing note reviewed  Constitutional:       General: She is not in acute distress  Appearance: She is well-developed  HENT:      Head: Normocephalic and atraumatic  Eyes:      Extraocular Movements: Extraocular movements intact  Cardiovascular:      Rate and Rhythm: Normal rate and regular rhythm  Pulmonary:      Effort: Pulmonary effort is normal  No respiratory distress  Abdominal:      Palpations: Abdomen is soft  Tenderness: There is no abdominal tenderness  There is no guarding or rebound  Musculoskeletal:         General: Normal range of motion  Cervical back: Normal range of motion and neck supple  Comments: No midline lumbar or thoracic spine tenderness, NV intact distally in BLE, patient able to sit up, turn around and sit in bed   Skin:     General: Skin is warm and dry  Neurological:      General: No focal deficit present  Mental Status: She is alert and oriented to person, place, and time     Psychiatric:         Mood and Affect: Mood normal          Behavior: Behavior normal          Vital Signs  ED Triage Vitals [06/30/22 1829]   Temperature Pulse Respirations Blood Pressure SpO2   98 9 °F (37 2 °C) 103 20 115/81 98 %      Temp Source Heart Rate Source Patient Position - Orthostatic VS BP Location FiO2 (%)   Oral Monitor Sitting Right arm --      Pain Score       10 - Worst Possible Pain           Vitals:    06/30/22 1829   BP: 115/81   Pulse: 103   Patient Position - Orthostatic VS: Sitting         Visual Acuity      ED Medications  Medications   ketorolac (TORADOL) injection 30 mg (30 mg Intramuscular Given 6/30/22 1927)   methocarbamol (ROBAXIN) tablet 500 mg (500 mg Oral Given 6/30/22 1926)   acetaminophen (TYLENOL) tablet 975 mg (975 mg Oral Given 6/30/22 1926)       Diagnostic Studies  Results Reviewed     None                 No orders to display              Procedures  Procedures         ED Course                               SBIRT 20yo+    Flowsheet Row Most Recent Value   SBIRT (25 yo +)    In order to provide better care to our patients, we are screening all of our patients for alcohol and drug use  Would it be okay to ask you these screening questions? No Filed at: 06/30/2022 5002                    MDM  Number of Diagnoses or Management Options  Acute exacerbation of chronic low back pain  Diagnosis management comments: Patient is a 40 old comes in with acute on chronic back, patient has history lumbar/thoracic disc disease, arthritis, has had MRIs in March, follows at [facility], no red flags at this point, patient is able to sit up, turned around and sit in the bed, no midline lumbar spine tenderness,, neurovascular intact distally in bilateral lower extremities  Impression:  Acute on chronic back pain, will treat symptomatically with pain meds, lidocaine patch, would need follow-up with Spine as outpatient        Disposition  Final diagnoses:   Acute exacerbation of chronic low back pain     Time reflects when diagnosis was documented in both MDM as applicable and the Disposition within this note     Time User Action Codes Description Comment    6/30/2022  7:19 PM Adarsh Mora Add [M54 50,  G89 29] Acute exacerbation of chronic low back pain       ED Disposition     ED Disposition   Discharge    Condition   Stable    Date/Time   Thu Jun 30, 2022  8:13 PM    Comment   Ruben [id number] discharge to home/self care                 Follow-up Information     Follow up With Specialties Details Why Contact Info Additional [phone] [facility] Schedule an appointment as soon as possible for a visit   [address]  [place] [address] [facility] Schedule an appointment as soon as possible for a visit   [place] [address]  [address], [address], [address] [place], [place], [id number] [phone]          Discharge Medication List as of 6/30/2022  8:14 PM      CONTINUE these medications which have NOT CHANGED    Details   acetaminophen (TYLENOL) 325 mg tablet Take 3 tablets (975 mg total) by mouth every 6 (six) hours as needed for mild pain or fever, Starting Fri 2/28/2020, Normal      albuterol (2 5 mg/3 mL) 0 083 % nebulizer solution Take 3 mL (2 5 mg total) by nebulization every 6 (six) hours as needed for wheezing or shortness of breath, Starting Tue 10/19/2021, Print      !! albuterol (5 mg/mL) 0 5 % nebulizer solution Take 0 5 mL (2 5 mg total) by nebulization every 6 (six) hours as needed for wheezing, Starting Fri 2/28/2020, Normal      !! albuterol (5 mg/mL) 0 5 % nebulizer solution Take 0 5 mL (2 5 mg total) by nebulization every 6 (six) hours as needed for wheezing, Starting Mon 9/7/2020, Normal      !! albuterol (ProAir HFA) 90 mcg/act inhaler Inhale 2 puffs every 6 (six) hours as needed for wheezing, Starting Tue 10/19/2021, Print      !! albuterol (PROVENTIL HFA,VENTOLIN HFA) 90 mcg/act inhaler Inhale 2 puffs every 4 (four) hours as needed for wheezing, Starting Fri 2/28/2020, Normal      !! albuterol (PROVENTIL HFA,VENTOLIN HFA) 90 mcg/act inhaler Inhale 2 puffs every 4 (four) hours as needed for wheezing, Starting Mon 9/7/2020, Normal      aluminum-magnesium hydroxide-simethicone (MAALOX) 200-200-20 MG/5ML SUSP Take 15 mL by mouth 4 (four) times a day (before meals and at bedtime), Starting Sun 5/19/2019, Print      Ascorbic Acid (vitamin C) 1000 MG tablet Take 1 tablet (1,000 mg total) by mouth 2 (two) times a day for 7 days, Starting Sun 12/20/2020, Until Sun 12/27/2020, Normal      benzonatate (TESSALON PERLES) 100 mg capsule Take 1 capsule (100 mg total) by mouth every 8 (eight) hours, Starting Sun 12/20/2020, Normal      buPROPion (WELLBUTRIN SR) 200 MG 12 hr tablet Take 200 mg by mouth daily, Historical Med      cholecalciferol (VITAMIN D3) 1,000 units tablet Take 2 tablets (2,000 Units total) by mouth daily for 7 days, Starting Sun 12/20/2020, Until Sun 12/27/2020, Normal      diclofenac sodium (VOLTAREN) 1 % Apply 2 g topically 4 (four) times a day, Starting Mon 1/20/2020, Normal      escitalopram (LEXAPRO) 20 mg tablet Take 5 mg by mouth daily, Historical Med      famotidine (PEPCID) 20 mg tablet Take 1 tablet (20 mg total) by mouth 2 (two) times a day, Starting Fri 7/19/2019, Print      guaiFENesin (ROBITUSSIN) 100 MG/5ML oral liquid Take 5-10 mL (100-200 mg total) by mouth every 4 (four) hours as needed for cough, Starting Sun 12/20/2020, Normal      !! ibuprofen (MOTRIN) 400 mg tablet Take 1 tablet (400 mg total) by mouth every 6 (six) hours as needed for mild pain, moderate pain or fever, Starting Sat 10/13/2018, Normal      !! ibuprofen (MOTRIN) 600 mg tablet Take 1 tablet (600 mg total) by mouth every 6 (six) hours as needed for mild pain, Starting Fri 2/28/2020, Normal      ipratropium (ATROVENT) 0 02 % nebulizer solution Take 1 vial (0 5 mg total) by nebulization 4 (four) times a day, Starting Mon 9/7/2020, Normal      loratadine (CLARITIN) 10 mg tablet Take 1 tablet (10 mg total) by mouth daily, Starting Mon 9/7/2020, Normal      methocarbamol (ROBAXIN) 500 mg tablet Take 1 tablet (500 mg total) by mouth every 12 (twelve) hours as needed for muscle spasms, Starting Mon 1/20/2020, Normal      Multiple Vitamin (multivitamin) capsule Take 1 capsule by mouth daily for 7 days, Starting Sun 12/20/2020, Until Sun 12/27/2020, Normal      ondansetron (ZOFRAN) 4 mg tablet Take 1 tablet (4 mg total) by mouth every 6 (six) hours, Starting Fri 2/28/2020, Normal      ondansetron (ZOFRAN-ODT) 4 mg disintegrating tablet Take 1 tablet (4 mg total) by mouth every 8 (eight) hours as needed for nausea or vomiting, Starting Mon 9/30/2019, Print      pantoprazole (PROTONIX) 20 mg tablet Take 1 tablet (20 mg total) by mouth daily, Starting Mon 9/30/2019, Print      QUEtiapine Fumarate (SEROQUEL PO) Take 300 mg by mouth daily at bedtime  , Historical Med      !! sucralfate (CARAFATE) 1 g/10 mL suspension Take 10 mL (1 g total) by mouth 4 (four) times a day, Starting Fri 7/19/2019, Print      !! sucralfate (CARAFATE) 1 g/10 mL suspension Take 10 mL (1 g total) by mouth 4 (four) times a day, Starting Mon 9/30/2019, Print      TRAZODONE HCL PO Take 150 mg by mouth daily at bedtime  , Historical Med      zolpidem (AMBIEN CR) 12 5 MG CR tablet Take 12 5 mg by mouth daily at bedtime as needed for sleep (unknown dosage), Historical Med       !! - Potential duplicate medications found  Please discuss with provider  No discharge procedures on file      PDMP Review     None          ED Provider  Electronically Signed by           Mari Cook MD  07/01/22 9183

## 2022-07-01 NOTE — ED NOTES
Pt stopped RN to let her know that she has MyChart and does not need discharge papers; provider will be notified        Ulises Yao RN  06/30/22 2015

## 2022-09-02 ENCOUNTER — APPOINTMENT (EMERGENCY)
Dept: CT IMAGING | Facility: HOSPITAL | Age: 37
End: 2022-09-02
Payer: MEDICARE

## 2022-09-02 ENCOUNTER — HOSPITAL ENCOUNTER (EMERGENCY)
Facility: HOSPITAL | Age: 37
Discharge: HOME | End: 2022-09-02
Attending: EMERGENCY MEDICINE
Payer: MEDICARE

## 2022-09-02 VITALS
WEIGHT: 260.14 LBS | RESPIRATION RATE: 18 BRPM | OXYGEN SATURATION: 100 % | HEART RATE: 92 BPM | TEMPERATURE: 98.4 F | SYSTOLIC BLOOD PRESSURE: 124 MMHG | DIASTOLIC BLOOD PRESSURE: 74 MMHG | BODY MASS INDEX: 47.58 KG/M2

## 2022-09-02 DIAGNOSIS — R51.9 HEADACHE: Primary | ICD-10-CM

## 2022-09-02 DIAGNOSIS — M62.838 MUSCLE SPASMS OF NECK: ICD-10-CM

## 2022-09-02 LAB
ATRIAL RATE: 107 BPM
P AXIS: 71 DEGREES
PR INTERVAL: 128 MS
QRS AXIS: 71 DEGREES
QRSD INTERVAL: 70 MS
QT INTERVAL: 334 MS
QTC INTERVAL: 445 MS
T WAVE AXIS: 46 DEGREES
VENTRICULAR RATE: 107 BPM

## 2022-09-02 PROCEDURE — 96361 HYDRATE IV INFUSION ADD-ON: CPT

## 2022-09-02 PROCEDURE — 93005 ELECTROCARDIOGRAM TRACING: CPT

## 2022-09-02 PROCEDURE — 99284 EMERGENCY DEPT VISIT MOD MDM: CPT

## 2022-09-02 PROCEDURE — 70450 CT HEAD/BRAIN W/O DYE: CPT

## 2022-09-02 PROCEDURE — G1004 CDSM NDSC: HCPCS

## 2022-09-02 PROCEDURE — 96374 THER/PROPH/DIAG INJ IV PUSH: CPT

## 2022-09-02 PROCEDURE — 93010 ELECTROCARDIOGRAM REPORT: CPT

## 2022-09-02 PROCEDURE — 99285 EMERGENCY DEPT VISIT HI MDM: CPT

## 2022-09-02 RX ORDER — GABAPENTIN 600 MG/1
600 TABLET ORAL 3 TIMES DAILY
COMMUNITY

## 2022-09-02 RX ORDER — METHOCARBAMOL 500 MG/1
500 TABLET, FILM COATED ORAL 2 TIMES DAILY
Qty: 20 TABLET | Refills: 0 | Status: SHIPPED | OUTPATIENT
Start: 2022-09-02

## 2022-09-02 RX ORDER — TRAMADOL HYDROCHLORIDE 50 MG/1
50 TABLET ORAL EVERY 6 HOURS PRN
COMMUNITY

## 2022-09-02 RX ORDER — LIDOCAINE 50 MG/G
1 PATCH TOPICAL DAILY
Qty: 7 PATCH | Refills: 0 | Status: SHIPPED | OUTPATIENT
Start: 2022-09-02

## 2022-09-02 RX ORDER — METHOCARBAMOL 500 MG/1
500 TABLET, FILM COATED ORAL ONCE
Status: COMPLETED | OUTPATIENT
Start: 2022-09-02 | End: 2022-09-02

## 2022-09-02 RX ORDER — ACETAMINOPHEN 325 MG/1
650 TABLET ORAL ONCE
Status: COMPLETED | OUTPATIENT
Start: 2022-09-02 | End: 2022-09-02

## 2022-09-02 RX ORDER — KETOROLAC TROMETHAMINE 30 MG/ML
15 INJECTION, SOLUTION INTRAMUSCULAR; INTRAVENOUS ONCE
Status: COMPLETED | OUTPATIENT
Start: 2022-09-02 | End: 2022-09-02

## 2022-09-02 RX ORDER — LIDOCAINE 50 MG/G
1 PATCH TOPICAL ONCE
Status: DISCONTINUED | OUTPATIENT
Start: 2022-09-02 | End: 2022-09-02 | Stop reason: HOSPADM

## 2022-09-02 RX ADMIN — ACETAMINOPHEN 650 MG: 325 TABLET ORAL at 15:26

## 2022-09-02 RX ADMIN — LIDOCAINE 1 PATCH: 50 PATCH CUTANEOUS at 15:31

## 2022-09-02 RX ADMIN — SODIUM CHLORIDE 1000 ML: 0.9 INJECTION, SOLUTION INTRAVENOUS at 15:23

## 2022-09-02 RX ADMIN — METHOCARBAMOL 500 MG: 500 TABLET ORAL at 15:26

## 2022-09-02 RX ADMIN — KETOROLAC TROMETHAMINE 15 MG: 30 INJECTION, SOLUTION INTRAMUSCULAR at 15:33

## 2022-09-02 NOTE — ED NOTES
Pt wanting to leave to go get a meal so she can take her meds  States she feels much better       Pedro Martinez RN  09/02/22 9729

## 2022-09-02 NOTE — ED PROVIDER NOTES
History  Chief Complaint   Patient presents with    Shoulder Pain    Neck Pain     Patient reports unable to turn neck to the right, reports she has been up with the pain since 5:30      40 YOF with PMH pre diabetes, GERD, asthma, and migraines presents today with right shoulder pain that radiates into the right side of her neck and her head causing a headache  Reports she woke up this AM with the pain and it continued to worsen even after taking 3 ibuprofen  She cannot move her head due to the pain and the movement makes it worse  Pain radiates down her right arm  States it feels like her right arm is weak due to the pain  States the headache is not like her typical migraines and this one is more of a pinching  Admits to a slight headache yesterday that she use an abortive medication for and it worked  Denies numbness, tingling  Denies dizziness, LH, changes in vision  Denies N/V/D, CP, SOB, palpitations, fever, chills  No history of IVDA or cancer  Admits to chronic back pain due to nerve impingements in her lower back  Is on Topamax from PCP until pt has follow up with neuro in December  Pt seen at [facility] on 8/15 for migraine x2 weeks  Had labs performed and UA- however pt left prior to discussion of the results  I reviewed them and they are WNL  Pt was given Tylenol, Benadryl, Toradol, Reglan, IVF  Prior to Admission Medications   Prescriptions Last Dose Informant Patient Reported? Taking?    Ascorbic Acid (vitamin C) 1000 MG tablet   No No   Sig: Take 1 tablet (1,000 mg total) by mouth 2 (two) times a day for 7 days   Multiple Vitamin (multivitamin) capsule   No No   Sig: Take 1 capsule by mouth daily for 7 days   QUEtiapine Fumarate (SEROQUEL PO)  Self Yes No   Sig: Take 300 mg by mouth daily at bedtime     TRAZODONE HCL PO  Self Yes No   Sig: Take 150 mg by mouth daily at bedtime     acetaminophen (TYLENOL) 325 mg tablet   No No   Sig: Take 3 tablets (975 mg total) by mouth every 6 (six) hours as needed for mild pain or fever   albuterol (2 5 mg/3 mL) 0 083 % nebulizer solution   No No   Sig: Take 3 mL (2 5 mg total) by nebulization every 6 (six) hours as needed for wheezing or shortness of breath   albuterol (5 mg/mL) 0 5 % nebulizer solution   No No   Sig: Take 0 5 mL (2 5 mg total) by nebulization every 6 (six) hours as needed for wheezing   albuterol (5 mg/mL) 0 5 % nebulizer solution   No No   Sig: Take 0 5 mL (2 5 mg total) by nebulization every 6 (six) hours as needed for wheezing   albuterol (PROVENTIL HFA,VENTOLIN HFA) 90 mcg/act inhaler   No No   Sig: Inhale 2 puffs every 4 (four) hours as needed for wheezing   albuterol (PROVENTIL HFA,VENTOLIN HFA) 90 mcg/act inhaler   No No   Sig: Inhale 2 puffs every 4 (four) hours as needed for wheezing   albuterol (ProAir HFA) 90 mcg/act inhaler   No Yes   Sig: Inhale 2 puffs every 6 (six) hours as needed for wheezing   aluminum-magnesium hydroxide-simethicone (MAALOX) 200-200-20 MG/5ML SUSP   No No   Sig: Take 15 mL by mouth 4 (four) times a day (before meals and at bedtime)   benzonatate (TESSALON PERLES) 100 mg capsule   No No   Sig: Take 1 capsule (100 mg total) by mouth every 8 (eight) hours   buPROPion (WELLBUTRIN SR) 200 MG 12 hr tablet   Yes No   Sig: Take 200 mg by mouth daily   cholecalciferol (VITAMIN D3) 1,000 units tablet   No No   Sig: Take 2 tablets (2,000 Units total) by mouth daily for 7 days   diclofenac sodium (VOLTAREN) 1 %   No No   Sig: Apply 2 g topically 4 (four) times a day   escitalopram (LEXAPRO) 20 mg tablet   Yes No   Sig: Take 5 mg by mouth daily   famotidine (PEPCID) 20 mg tablet   No No   Sig: Take 1 tablet (20 mg total) by mouth 2 (two) times a day   guaiFENesin (ROBITUSSIN) 100 MG/5ML oral liquid   No No   Sig: Take 5-10 mL (100-200 mg total) by mouth every 4 (four) hours as needed for cough   ibuprofen (MOTRIN) 400 mg tablet   No No   Sig: Take 1 tablet (400 mg total) by mouth every 6 (six) hours as needed for mild pain, moderate pain or fever   ibuprofen (MOTRIN) 600 mg tablet   No No   Sig: Take 1 tablet (600 mg total) by mouth every 6 (six) hours as needed for mild pain   ipratropium (ATROVENT) 0 02 % nebulizer solution   No No   Sig: Take 1 vial (0 5 mg total) by nebulization 4 (four) times a day   loratadine (CLARITIN) 10 mg tablet   No No   Sig: Take 1 tablet (10 mg total) by mouth daily   methocarbamol (ROBAXIN) 500 mg tablet   No No   Sig: Take 1 tablet (500 mg total) by mouth every 12 (twelve) hours as needed for muscle spasms   ondansetron (ZOFRAN) 4 mg tablet   No No   Sig: Take 1 tablet (4 mg total) by mouth every 6 (six) hours   ondansetron (ZOFRAN-ODT) 4 mg disintegrating tablet   No No   Sig: Take 1 tablet (4 mg total) by mouth every 8 (eight) hours as needed for nausea or vomiting   pantoprazole (PROTONIX) 20 mg tablet   No No   Sig: Take 1 tablet (20 mg total) by mouth daily   sucralfate (CARAFATE) 1 g/10 mL suspension   No No   Sig: Take 10 mL (1 g total) by mouth 4 (four) times a day   sucralfate (CARAFATE) 1 g/10 mL suspension   No No   Sig: Take 10 mL (1 g total) by mouth 4 (four) times a day   zolpidem (AMBIEN CR) 12 5 MG CR tablet  Self Yes No   Sig: Take 12 5 mg by mouth daily at bedtime as needed for sleep (unknown dosage)      Facility-Administered Medications: None       Past Medical History:   Diagnosis Date    Anxiety     Arthritis     Bipolar disease, chronic (HCC)        Past Surgical History:   Procedure Laterality Date    NO PAST SURGERIES         History reviewed  No pertinent family history  I have reviewed and agree with the history as documented      E-Cigarette/Vaping    E-Cigarette Use Never User      E-Cigarette/Vaping Substances     Social History     Tobacco Use    Smoking status: Current Every Day Smoker     Packs/day: 0 50    Smokeless tobacco: Never Used   Vaping Use    Vaping Use: Never used   Substance Use Topics    Alcohol use: Never    Drug use: Yes     Types: Marijuana Comment: rare       Review of Systems   Constitutional: Negative for chills and fever  HENT: Negative for ear pain and sore throat  Eyes: Negative for pain and visual disturbance  Respiratory: Negative for cough and shortness of breath  Cardiovascular: Negative for chest pain and palpitations  Gastrointestinal: Negative for abdominal pain and vomiting  Genitourinary: Negative for dysuria and hematuria  Musculoskeletal: Positive for myalgias, neck pain and neck stiffness  Negative for arthralgias and back pain  Skin: Negative for color change and rash  Neurological: Positive for headaches  Negative for seizures and syncope  All other systems reviewed and are negative  Physical Exam  Physical Exam  Vitals and nursing note reviewed  Constitutional:       General: She is not in acute distress  Appearance: She is well-developed  HENT:      Head: Normocephalic and atraumatic  Nose: Nose normal       Mouth/Throat:      Mouth: Mucous membranes are moist    Eyes:      Conjunctiva/sclera: Conjunctivae normal    Neck:      Comments: ROM decreased due to the pain  Cardiovascular:      Rate and Rhythm: Normal rate and regular rhythm  Heart sounds: No murmur heard  Pulmonary:      Effort: Pulmonary effort is normal  No respiratory distress  Breath sounds: Normal breath sounds  Abdominal:      Palpations: Abdomen is soft  Tenderness: There is no abdominal tenderness  Musculoskeletal:         General: Tenderness present  No swelling  Cervical back: Neck supple  Tenderness (right paraspinal  spasms present ) present  Comments: TTP to right trap muscles  No deformity  Spasms present  Strength 5/5 in b/l arms  Pulses and sensory intact  Skin:     General: Skin is warm and dry  Capillary Refill: Capillary refill takes less than 2 seconds  Neurological:      General: No focal deficit present        Mental Status: She is alert and oriented to person, place, and time  Cranial Nerves: No cranial nerve deficit  Sensory: No sensory deficit  Motor: No weakness  Coordination: Coordination normal       Comments: No weakness  Psychiatric:         Mood and Affect: Mood normal          Behavior: Behavior normal          Vital Signs  ED Triage Vitals [09/02/22 1418]   Temperature Pulse Respirations Blood Pressure SpO2   98 4 °F (36 9 °C) 105 16 161/90 99 %      Temp Source Heart Rate Source Patient Position - Orthostatic VS BP Location FiO2 (%)   Oral Monitor Sitting Right arm --      Pain Score       10 - Worst Possible Pain           Vitals:    09/02/22 1418   BP: 161/90   Pulse: 105   Patient Position - Orthostatic VS: Sitting         Visual Acuity      ED Medications  Medications   sodium chloride 0 9 % bolus 1,000 mL (1,000 mL Intravenous New Bag 9/2/22 1523)   lidocaine (LIDODERM) 5 % patch 1 patch (1 patch Topical Medication Applied 9/2/22 1531)   methocarbamol (ROBAXIN) tablet 500 mg (500 mg Oral Given 9/2/22 1526)   acetaminophen (TYLENOL) tablet 650 mg (650 mg Oral Given 9/2/22 1526)   ketorolac (TORADOL) injection 15 mg (15 mg Intravenous Given 9/2/22 1533)       Diagnostic Studies  Results Reviewed     None                 CT head without contrast   Final Result by Jazlyn Cabrera MD (09/02 1528)      No acute intracranial abnormality                    Workstation performed: [id number]                    Procedures  ECG 12 Lead Documentation Only    Date/Time: 9/2/2022 3:21 PM  Performed by: Rosalva Robledo PA-C  Authorized by: Rosalva Robledo PA-C     ECG reviewed by me, the ED Provider: yes    Patient location:  ED  Previous ECG:     Previous ECG:  Unavailable  Interpretation:     Interpretation: normal    Rate:     ECG rate:  107    ECG rate assessment: tachycardic    Rhythm:     Rhythm: sinus rhythm    Ectopy:     Ectopy: none    QRS:     QRS axis:  Normal  Conduction:     Conduction: normal    ST segments:     ST segments:  Normal  T waves:     T waves: normal               ED Course  ED Course as of 22 1538   Fri Sep 02, 2022   1449 Pt states her father  of heart disease and she had to wear a holter monitor once when she was little  Requesting ECG     1522 ECG: ST at 107 bpm    1529 CT head without contrast  No acute intracranial abnormality  1532 Sign out to [facility] [employer] PA-C: Pending reassessment after pain meds              MDM  Number of Diagnoses or Management Options  Headache: new and requires workup  Muscle spasms of neck: new and does not require workup  Diagnosis management comments: 40 YOF with PMH pre diabetes, GERD, asthma, and migraines presents today with right shoulder pain that radiates into the right side of her neck and her head causing a headache  Has taken Ibuprofen without relief  Physical exam as noted above  Vitals stable  Afebrile and nontachy  Neuro exam normal  However given that pt's headache is different than her normal, CT of head performed which was normal  Pt given IVF, Toradol, Robaxin, Tylenol and Lidocaine patch,     Sign out to [facility] PA-C: Pending reassessment after pain medications  Can d/c home if pain improves          Amount and/or Complexity of Data Reviewed  Clinical lab tests: reviewed  Tests in the radiology section of CPT®: ordered and reviewed  Decide to obtain previous medical records or to obtain history from someone other than the patient: yes  Review and summarize past medical records: yes    Patient Progress  Patient progress: stable      Disposition  Final diagnoses:   Headache   Muscle spasms of neck     Time reflects when diagnosis was documented in both MDM as applicable and the Disposition within this note     Time User Action Codes Description Comment    2022  3:10 PM Jami Bolaños Add [R51 9] Headache     2022  3:10 PM Jami Bolaños Add [N99 [id number]] Muscle spasms of neck       ED Disposition     None      Follow-up Information     Follow up With Specialties Details Why Contact Info Additional Information    [facility] Schedule an appointment as soon as possible for a visit   [address] [facility] Emergency Medicine  If symptoms worsen [facility] [id number]  [address] [facility] Emergency Department, [address] [facility] , [facility], [address] [facility], [address]          Patient's Medications   Discharge Prescriptions    LIDOCAINE (LIDODERM) 5 %    Apply 1 patch topically daily Remove & Discard patch within 12 hours or as directed by MD       Start Date: 9/2/2022  End Date: --       Order Dose: 1 patch       Quantity: 7 patch    Refills: 0    METHOCARBAMOL (ROBAXIN) 500 MG TABLET    Take 1 tablet (500 mg total) by mouth 2 (two) times a day       Start Date: 9/2/2022  End Date: --       Order Dose: 500 mg       Quantity: 20 tablet    Refills: 0       No discharge procedures on file      PDMP Review     None          ED Provider  Electronically Signed by           Dmitry Denis PA-C  09/02/22 3925

## 2022-09-02 NOTE — ED CARE HANDOFF
Emergency Department Sign Out Note        Sign out and transfer of care from [facility]  See Separate Emergency Department note  The patient, Compa Valle, was evaluated by the previous provider for right shoulder pain, right neck pain and headache since this morning  Workup Completed:  CT head  EKG    ED Course / Workup Pending (followup):     1536 Per sign out, patient just received medications  Will reassess and discharge  1710 Patient reporting improvement in symptoms  Patient sitting up and moving head  Patient requesting something to eat  Crackers provided by nurse  Will discharge  Patient agreeable  ED Course as of 09/02/22 1711   Fri Sep 02, 2022   1536 Per sign out, patient just received medications  Will reassess and discharge  1710 Patient reporting improvement in symptoms  Patient sitting up and moving head  Patient requesting something to eat  Crackers provided by nurse  Will discharge  Patient agreeable       Procedures  MDM  Number of Diagnoses or Management Options  Headache: new and requires workup  Muscle spasms of neck: new and requires workup  Diagnosis management comments:          Amount and/or Complexity of Data Reviewed  Tests in the radiology section of CPT®: reviewed  Review and summarize past medical records: yes  Discuss the patient with other providers: yes    Patient Progress  Patient progress: stable          Disposition  Final diagnoses:   Headache   Muscle spasms of neck     Time reflects when diagnosis was documented in both MDM as applicable and the Disposition within this note     Time User Action Codes Description Comment    9/2/2022  3:10 PM Milana Oliveros [R51 9] Headache     9/2/2022  3:10 PM Vitaliy Flores [J45 [id number]] Muscle spasms of neck       ED Disposition     ED Disposition   Discharge    Condition   Stable    Date/Time   Fri Sep 2, 2022  5:06 PM    Comment   Ruben HASSANJXCZIOSJIC discharge to home/self care                Follow-up Information     Follow up With Specialties Details Why Contact Info Additional [phone] [facility] Schedule an appointment as soon as possible for a visit   [address]  [facility] Emergency Department Emergency Medicine  If symptoms worsen [facility] [id number]  [address] [facility] Emergency Department, [address]  , [address] [place], [place], [address]        Patient's Medications   Discharge Prescriptions    LIDOCAINE (LIDODERM) 5 %    Apply 1 patch topically daily Remove & Discard patch within 12 hours or as directed by MD       Start Date: 9/2/2022  End Date: --       Order Dose: 1 patch       Quantity: 7 patch    Refills: 0    METHOCARBAMOL (ROBAXIN) 500 MG TABLET    Take 1 tablet (500 mg total) by mouth 2 (two) times a day       Start Date: 9/2/2022  End Date: --       Order Dose: 500 mg       Quantity: 20 tablet    Refills: 0     No discharge procedures on file         ED Provider  Electronically Signed by     Cortney Viera PA-C  09/02/22 0213

## 2022-09-02 NOTE — DISCHARGE INSTRUCTIONS
-schedule follow up with PCP   -use Robaxin for muscle spasms  -use Lidocaine patches as needed   -use heating pad at home and stretch

## 2023-03-27 ENCOUNTER — HOSPITAL ENCOUNTER (EMERGENCY)
Facility: HOSPITAL | Age: 38
Discharge: HOME | End: 2023-03-27
Attending: EMERGENCY MEDICINE | Admitting: EMERGENCY MEDICINE

## 2023-03-27 VITALS
OXYGEN SATURATION: 99 % | RESPIRATION RATE: 18 BRPM | BODY MASS INDEX: 46.61 KG/M2 | TEMPERATURE: 98.2 F | HEART RATE: 89 BPM | DIASTOLIC BLOOD PRESSURE: 70 MMHG | WEIGHT: 254.85 LBS | SYSTOLIC BLOOD PRESSURE: 122 MMHG

## 2023-03-27 DIAGNOSIS — B37.2 CANDIDIASIS OF SKIN: Primary | ICD-10-CM

## 2023-03-27 LAB
BILIRUB UR QL STRIP: NEGATIVE
CLARITY UR: CLEAR
COLOR UR: NORMAL
EXT PREGNANCY TEST URINE: NEGATIVE
EXT. CONTROL: NORMAL
GLUCOSE SERPL-MCNC: 109 MG/DL (ref 65–140)
GLUCOSE UR STRIP-MCNC: NEGATIVE MG/DL
HGB UR QL STRIP.AUTO: NEGATIVE
KETONES UR STRIP-MCNC: NEGATIVE MG/DL
LEUKOCYTE ESTERASE UR QL STRIP: NEGATIVE
NITRITE UR QL STRIP: NEGATIVE
PH UR STRIP.AUTO: 7 [PH] (ref 4.5–8)
PROT UR STRIP-MCNC: NEGATIVE MG/DL
SP GR UR STRIP.AUTO: >=1.03 (ref 1–1.03)
UROBILINOGEN UR QL STRIP.AUTO: 1 E.U./DL

## 2023-03-27 RX ORDER — NYSTATIN 100000 [USP'U]/G
POWDER TOPICAL 4 TIMES DAILY
Qty: 15 G | Refills: 0 | Status: SHIPPED | OUTPATIENT
Start: 2023-03-27

## 2023-03-27 NOTE — ED PROVIDER NOTES
History  Chief Complaint   Patient presents with   • Skin Problem     Pt c/o open skin near her private area that started about 2 days ago denies odor      Patient presents emerged part with a rash to her groin and skin folds  States that it itches and burns and has been sore  She is concerned because the skin is open  Patient is diabetic and is on metformin once a day  Patient does not check her sugars at home  She is concerned that her sugars may be high  Prior to Admission Medications   Prescriptions Last Dose Informant Patient Reported? Taking?    Ascorbic Acid (vitamin C) 1000 MG tablet   No No   Sig: Take 1 tablet (1,000 mg total) by mouth 2 (two) times a day for 7 days   Multiple Vitamin (multivitamin) capsule   No No   Sig: Take 1 capsule by mouth daily for 7 days   acetaminophen (TYLENOL) 325 mg tablet   No No   Sig: Take 3 tablets (975 mg total) by mouth every 6 (six) hours as needed for mild pain or fever   albuterol (2 5 mg/3 mL) 0 083 % nebulizer solution   No No   Sig: Take 3 mL (2 5 mg total) by nebulization every 6 (six) hours as needed for wheezing or shortness of breath   albuterol (ProAir HFA) 90 mcg/act inhaler   No No   Sig: Inhale 2 puffs every 6 (six) hours as needed for wheezing   aluminum-magnesium hydroxide-simethicone (MAALOX) 200-200-20 MG/5ML SUSP   No No   Sig: Take 15 mL by mouth 4 (four) times a day (before meals and at bedtime)   buPROPion (WELLBUTRIN SR) 200 MG 12 hr tablet   Yes No   Sig: Take 200 mg by mouth daily   cholecalciferol (VITAMIN D3) 1,000 units tablet   No No   Sig: Take 2 tablets (2,000 Units total) by mouth daily for 7 days   diclofenac sodium (VOLTAREN) 1 %   No No   Sig: Apply 2 g topically 4 (four) times a day   escitalopram (LEXAPRO) 20 mg tablet   Yes No   Sig: Take 5 mg by mouth daily   gabapentin (NEURONTIN) 600 MG tablet   Yes No   Sig: Take 600 mg by mouth 3 (three) times a day   guaiFENesin (ROBITUSSIN) 100 MG/5ML oral liquid   No No   Sig: Take 5-10 mL (100-200 mg total) by mouth every 4 (four) hours as needed for cough   ibuprofen (MOTRIN) 400 mg tablet   No No   Sig: Take 1 tablet (400 mg total) by mouth every 6 (six) hours as needed for mild pain, moderate pain or fever   ibuprofen (MOTRIN) 600 mg tablet   No No   Sig: Take 1 tablet (600 mg total) by mouth every 6 (six) hours as needed for mild pain   ipratropium (ATROVENT) 0 02 % nebulizer solution   No No   Sig: Take 1 vial (0 5 mg total) by nebulization 4 (four) times a day   lidocaine (Lidoderm) 5 %   No No   Sig: Apply 1 patch topically daily Remove & Discard patch within 12 hours or as directed by MD   loratadine (CLARITIN) 10 mg tablet   No No   Sig: Take 1 tablet (10 mg total) by mouth daily   metFORMIN (GLUCOPHAGE) 850 mg tablet   Yes No   Sig: Take 850 mg by mouth daily with breakfast   methocarbamol (ROBAXIN) 500 mg tablet   No No   Sig: Take 1 tablet (500 mg total) by mouth 2 (two) times a day   ondansetron (ZOFRAN) 4 mg tablet   No No   Sig: Take 1 tablet (4 mg total) by mouth every 6 (six) hours   pantoprazole (PROTONIX) 20 mg tablet   No No   Sig: Take 1 tablet (20 mg total) by mouth daily   traMADol (ULTRAM) 50 mg tablet   Yes No   Sig: Take 50 mg by mouth every 6 (six) hours as needed for moderate pain      Facility-Administered Medications: None       Past Medical History:   Diagnosis Date   • Anxiety    • Arthritis    • Bipolar disease, chronic (HCC)    • Diabetes mellitus (HCC)        Past Surgical History:   Procedure Laterality Date   • NO PAST SURGERIES         History reviewed  No pertinent family history  I have reviewed and agree with the history as documented      E-Cigarette/Vaping   • E-Cigarette Use Never User      E-Cigarette/Vaping Substances     Social History     Tobacco Use   • Smoking status: Every Day     Packs/day: 0 50     Types: Cigarettes   • Smokeless tobacco: Never   Vaping Use   • Vaping Use: Never used   Substance Use Topics   • Alcohol use: Never   • Drug use: Yes     Types: Marijuana     Comment: rare       Review of Systems   Constitutional: Negative for fever  Respiratory: Negative  Cardiovascular: Negative  Gastrointestinal: Negative  Genitourinary: Negative for dysuria  Skin: Positive for rash  All other systems reviewed and are negative  Physical Exam  Physical Exam  Vitals and nursing note reviewed  Constitutional:       Appearance: She is well-developed  HENT:      Head: Normocephalic and atraumatic  Right Ear: Tympanic membrane and external ear normal       Left Ear: Tympanic membrane and external ear normal    Eyes:      Conjunctiva/sclera: Conjunctivae normal    Cardiovascular:      Rate and Rhythm: Normal rate and regular rhythm  Heart sounds: Normal heart sounds  Pulmonary:      Effort: Pulmonary effort is normal       Breath sounds: Normal breath sounds  Abdominal:      General: Bowel sounds are normal       Palpations: Abdomen is soft  Musculoskeletal:         General: Normal range of motion  Cervical back: Neck supple  Lymphadenopathy:      Cervical: No cervical adenopathy  Skin:     General: Skin is warm  Findings: Rash present  Comments: In the skin folds of patient's pannus and groin erythematous rash with a few satellite lesions consistent with a yeast infection   Neurological:      Mental Status: She is alert     Psychiatric:         Behavior: Behavior normal          Vital Signs  ED Triage Vitals [03/27/23 1126]   Temperature Pulse Respirations Blood Pressure SpO2   98 2 °F (36 8 °C) 89 18 122/70 99 %      Temp src Heart Rate Source Patient Position - Orthostatic VS BP Location FiO2 (%)   -- -- Sitting Right arm --      Pain Score       --           Vitals:    03/27/23 1126   BP: 122/70   Pulse: 89   Patient Position - Orthostatic VS: Sitting         Visual Acuity      ED Medications  Medications - No data to display    Diagnostic Studies  Results Reviewed     Procedure Component Value Units Date/Time    POCT pregnancy, urine [[id number]]  (Normal) Resulted: 03/27/23 1229    Lab Status: Final result Updated: 03/27/23 1229     EXT Preg Test, Ur Negative     Control Valid    Urine Macroscopic, POC [[id number]] Collected: 03/27/23 1223    Lab Status: Final result Specimen: Urine Updated: 03/27/23 1224     Color, UA Lianna     Clarity, UA Clear     pH, UA 7 0     Leukocytes, UA Negative     Nitrite, UA Negative     Protein, UA Negative mg/dl      Glucose, UA Negative mg/dl      Ketones, UA Negative mg/dl      Urobilinogen, UA 1 0 E U /dl      Bilirubin, UA Negative     Occult Blood, UA Negative     Specific Gravity, UA >=1 030    Narrative:      CLINITEK RESULT    Fingerstick Glucose (POCT) [[id number]]  (Normal) Collected: 03/27/23 1159    Lab Status: Final result Updated: 03/27/23 1202     POC Glucose 109 mg/dl                  No orders to display              Procedures  Procedures         ED Course                                             Medical Decision Making  Hx of dm - pt cant check sugars at home - will check here as pt has a yeast infection  Candidiasis of skin: acute illness or injury  Amount and/or Complexity of Data Reviewed  Labs: ordered  Risk  OTC drugs  Prescription drug management  Disposition  Final diagnoses:   Candidiasis of skin     Time reflects when diagnosis was documented in both MDM as applicable and the Disposition within this note     Time User Action Codes Description Comment    3/27/2023 12:12 PM Ericka Briones Add [B37 2] Candidiasis of skin       ED Disposition     ED Disposition   Discharge    Condition   Stable    Date/Time   Mon Mar 27, 2023 12:10 PM    Comment   Ruben Ayala discharge to home/self care                 Follow-up Information     Follow up With Specialties Details Why [address] [facility]   [address] [facility]  [phone]            Discharge Medication List as of 3/27/2023 12:12 PM      START taking these medications    Details   nystatin (MYCOSTATIN) powder Apply topically 4 (four) times a day, Starting Mon 3/27/2023, Normal         CONTINUE these medications which have NOT CHANGED    Details   acetaminophen (TYLENOL) 325 mg tablet Take 3 tablets (975 mg total) by mouth every 6 (six) hours as needed for mild pain or fever, Starting Fri 2/28/2020, Normal      albuterol (2 5 mg/3 mL) 0 083 % nebulizer solution Take 3 mL (2 5 mg total) by nebulization every 6 (six) hours as needed for wheezing or shortness of breath, Starting Tue 10/19/2021, Print      albuterol (ProAir HFA) 90 mcg/act inhaler Inhale 2 puffs every 6 (six) hours as needed for wheezing, Starting Tue 10/19/2021, Print      aluminum-magnesium hydroxide-simethicone (MAALOX) 200-200-20 MG/5ML SUSP Take 15 mL by mouth 4 (four) times a day (before meals and at bedtime), Starting Sun 5/19/2019, Print      Ascorbic Acid (vitamin C) 1000 MG tablet Take 1 tablet (1,000 mg total) by mouth 2 (two) times a day for 7 days, Starting Sun 12/20/2020, Until Fri 9/2/2022, Normal      buPROPion (WELLBUTRIN SR) 200 MG 12 hr tablet Take 200 mg by mouth daily, Historical Med      cholecalciferol (VITAMIN D3) 1,000 units tablet Take 2 tablets (2,000 Units total) by mouth daily for 7 days, Starting Sun 12/20/2020, Until Fri 9/2/2022, Normal      diclofenac sodium (VOLTAREN) 1 % Apply 2 g topically 4 (four) times a day, Starting Mon 1/20/2020, Normal      escitalopram (LEXAPRO) 20 mg tablet Take 5 mg by mouth daily, Historical Med      gabapentin (NEURONTIN) 600 MG tablet Take 600 mg by mouth 3 (three) times a day, Historical Med      guaiFENesin (ROBITUSSIN) 100 MG/5ML oral liquid Take 5-10 mL (100-200 mg total) by mouth every 4 (four) hours as needed for cough, Starting Sun 12/20/2020, Normal      !! ibuprofen (MOTRIN) 400 mg tablet Take 1 tablet (400 mg total) by mouth every 6 (six) hours as needed for mild pain, moderate pain or fever, Starting Sat 10/13/2018, Normal      !! ibuprofen (MOTRIN) 600 mg tablet Take 1 tablet (600 mg total) by mouth every 6 (six) hours as needed for mild pain, Starting Fri 2/28/2020, Normal      ipratropium (ATROVENT) 0 02 % nebulizer solution Take 1 vial (0 5 mg total) by nebulization 4 (four) times a day, Starting Mon 9/7/2020, Normal      lidocaine (Lidoderm) 5 % Apply 1 patch topically daily Remove & Discard patch within 12 hours or as directed by MD, Starting Fri 9/2/2022, Normal      loratadine (CLARITIN) 10 mg tablet Take 1 tablet (10 mg total) by mouth daily, Starting Mon 9/7/2020, Normal      metFORMIN (GLUCOPHAGE) 850 mg tablet Take 850 mg by mouth daily with breakfast, Historical Med      methocarbamol (ROBAXIN) 500 mg tablet Take 1 tablet (500 mg total) by mouth 2 (two) times a day, Starting Fri 9/2/2022, Normal      Multiple Vitamin (multivitamin) capsule Take 1 capsule by mouth daily for 7 days, Starting Sun 12/20/2020, Until Sun 12/27/2020, Normal      ondansetron (ZOFRAN) 4 mg tablet Take 1 tablet (4 mg total) by mouth every 6 (six) hours, Starting Fri 2/28/2020, Normal      pantoprazole (PROTONIX) 20 mg tablet Take 1 tablet (20 mg total) by mouth daily, Starting Mon 9/30/2019, Print      traMADol (ULTRAM) 50 mg tablet Take 50 mg by mouth every 6 (six) hours as needed for moderate pain, Historical Med       !! - Potential duplicate medications found  Please discuss with provider  No discharge procedures on file      PDMP Review     None          ED Provider  Electronically Signed by           Cindy Barrow PA-C  03/27/23 6652

## 2023-03-27 NOTE — Clinical Note
Sunil Mccrary was seen and treated in our emergency department on 3/27/2023  Diagnosis:     Ruben  may return to work on return date  She may return on this date: 03/28/2023         If you have any questions or concerns, please don't hesitate to call        Fernandez Velarde RN    ______________________________           _______________          _______________  Hospital Representative                              Date                                Time

## 2023-08-30 ENCOUNTER — HOSPITAL ENCOUNTER (EMERGENCY)
Facility: HOSPITAL | Age: 38
Discharge: HOME | End: 2023-08-30
Attending: EMERGENCY MEDICINE
Payer: MEDICARE

## 2023-08-30 VITALS
TEMPERATURE: 98 F | DIASTOLIC BLOOD PRESSURE: 87 MMHG | RESPIRATION RATE: 18 BRPM | SYSTOLIC BLOOD PRESSURE: 148 MMHG | OXYGEN SATURATION: 99 % | HEART RATE: 117 BPM

## 2023-08-30 DIAGNOSIS — D64.9 ANEMIA: ICD-10-CM

## 2023-08-30 DIAGNOSIS — R51.9 ACUTE HEADACHE: Primary | ICD-10-CM

## 2023-08-30 LAB
ANION GAP SERPL CALCULATED.3IONS-SCNC: 8 MMOL/L
BACTERIA UR QL AUTO: ABNORMAL /HPF
BASOPHILS # BLD AUTO: 0.09 THOUSANDS/ÂΜL (ref 0–0.1)
BASOPHILS NFR BLD AUTO: 1 % (ref 0–1)
BILIRUB UR QL STRIP: NEGATIVE
BUN SERPL-MCNC: 11 MG/DL (ref 5–25)
CALCIUM SERPL-MCNC: 9.4 MG/DL (ref 8.4–10.2)
CHLORIDE SERPL-SCNC: 107 MMOL/L (ref 96–108)
CLARITY UR: CLEAR
CO2 SERPL-SCNC: 22 MMOL/L (ref 21–32)
COLOR UR: YELLOW
CREAT SERPL-MCNC: 0.66 MG/DL (ref 0.6–1.3)
EOSINOPHIL # BLD AUTO: 0.27 THOUSAND/ÂΜL (ref 0–0.61)
EOSINOPHIL NFR BLD AUTO: 2 % (ref 0–6)
ERYTHROCYTE [DISTWIDTH] IN BLOOD BY AUTOMATED COUNT: 18.5 % (ref 11.6–15.1)
EXT PREGNANCY TEST URINE: NEGATIVE
EXT. CONTROL: NORMAL
GFR SERPL CREATININE-BSD FRML MDRD: 112 ML/MIN/1.73SQ M
GLUCOSE SERPL-MCNC: 112 MG/DL (ref 65–140)
GLUCOSE UR STRIP-MCNC: NEGATIVE MG/DL
HCT VFR BLD AUTO: 36.4 % (ref 34.8–46.1)
HGB BLD-MCNC: 11.1 G/DL (ref 11.5–15.4)
HGB UR QL STRIP.AUTO: ABNORMAL
IMM GRANULOCYTES # BLD AUTO: 0.05 THOUSAND/UL (ref 0–0.2)
IMM GRANULOCYTES NFR BLD AUTO: 0 % (ref 0–2)
KETONES UR STRIP-MCNC: NEGATIVE MG/DL
LEUKOCYTE ESTERASE UR QL STRIP: NEGATIVE
LYMPHOCYTES # BLD AUTO: 4.11 THOUSANDS/ÂΜL (ref 0.6–4.47)
LYMPHOCYTES NFR BLD AUTO: 33 % (ref 14–44)
MCH RBC QN AUTO: 24.1 PG (ref 26.8–34.3)
MCHC RBC AUTO-ENTMCNC: 30.5 G/DL (ref 31.4–37.4)
MCV RBC AUTO: 79 FL (ref 82–98)
MONOCYTES # BLD AUTO: 0.44 THOUSAND/ÂΜL (ref 0.17–1.22)
MONOCYTES NFR BLD AUTO: 4 % (ref 4–12)
MUCOUS THREADS UR QL AUTO: ABNORMAL
NEUTROPHILS # BLD AUTO: 7.65 THOUSANDS/ÂΜL (ref 1.85–7.62)
NEUTS SEG NFR BLD AUTO: 60 % (ref 43–75)
NITRITE UR QL STRIP: NEGATIVE
NON-SQ EPI CELLS URNS QL MICRO: ABNORMAL /HPF
NRBC BLD AUTO-RTO: 0 /100 WBCS
PH UR STRIP.AUTO: 5.5 [PH] (ref 4.5–8)
PLATELET # BLD AUTO: 356 THOUSANDS/UL (ref 149–390)
PMV BLD AUTO: 10.5 FL (ref 8.9–12.7)
POTASSIUM SERPL-SCNC: 3.9 MMOL/L (ref 3.5–5.3)
PROT UR STRIP-MCNC: NEGATIVE MG/DL
RBC # BLD AUTO: 4.6 MILLION/UL (ref 3.81–5.12)
RBC #/AREA URNS AUTO: ABNORMAL /HPF
SODIUM SERPL-SCNC: 137 MMOL/L (ref 135–147)
SP GR UR STRIP.AUTO: >=1.03 (ref 1–1.03)
UROBILINOGEN UR QL STRIP.AUTO: 0.2 E.U./DL
WBC # BLD AUTO: 12.61 THOUSAND/UL (ref 4.31–10.16)
WBC #/AREA URNS AUTO: ABNORMAL /HPF

## 2023-08-30 PROCEDURE — 80048 BASIC METABOLIC PNL TOTAL CA: CPT | Performed by: PHYSICIAN ASSISTANT

## 2023-08-30 PROCEDURE — 81001 URINALYSIS AUTO W/SCOPE: CPT

## 2023-08-30 PROCEDURE — 85025 COMPLETE CBC W/AUTO DIFF WBC: CPT | Performed by: PHYSICIAN ASSISTANT

## 2023-08-30 PROCEDURE — 99284 EMERGENCY DEPT VISIT MOD MDM: CPT | Performed by: PHYSICIAN ASSISTANT

## 2023-08-30 PROCEDURE — 96361 HYDRATE IV INFUSION ADD-ON: CPT

## 2023-08-30 PROCEDURE — 99283 EMERGENCY DEPT VISIT LOW MDM: CPT

## 2023-08-30 PROCEDURE — 36415 COLL VENOUS BLD VENIPUNCTURE: CPT | Performed by: PHYSICIAN ASSISTANT

## 2023-08-30 PROCEDURE — 96374 THER/PROPH/DIAG INJ IV PUSH: CPT

## 2023-08-30 PROCEDURE — 96375 TX/PRO/DX INJ NEW DRUG ADDON: CPT

## 2023-08-30 PROCEDURE — 81025 URINE PREGNANCY TEST: CPT | Performed by: PHYSICIAN ASSISTANT

## 2023-08-30 RX ORDER — METOCLOPRAMIDE HYDROCHLORIDE 5 MG/ML
10 INJECTION INTRAMUSCULAR; INTRAVENOUS ONCE
Status: COMPLETED | OUTPATIENT
Start: 2023-08-30 | End: 2023-08-30

## 2023-08-30 RX ORDER — KETOROLAC TROMETHAMINE 30 MG/ML
15 INJECTION, SOLUTION INTRAMUSCULAR; INTRAVENOUS ONCE
Status: COMPLETED | OUTPATIENT
Start: 2023-08-30 | End: 2023-08-30

## 2023-08-30 RX ORDER — DIPHENHYDRAMINE HYDROCHLORIDE 50 MG/ML
25 INJECTION INTRAMUSCULAR; INTRAVENOUS ONCE
Status: COMPLETED | OUTPATIENT
Start: 2023-08-30 | End: 2023-08-30

## 2023-08-30 RX ORDER — NAPROXEN 500 MG/1
500 TABLET ORAL 2 TIMES DAILY WITH MEALS
Qty: 10 TABLET | Refills: 0 | Status: SHIPPED | OUTPATIENT
Start: 2023-08-30

## 2023-08-30 RX ORDER — METOCLOPRAMIDE 10 MG/1
10 TABLET ORAL EVERY 6 HOURS
Qty: 10 TABLET | Refills: 0 | Status: SHIPPED | OUTPATIENT
Start: 2023-08-30

## 2023-08-30 RX ADMIN — SODIUM CHLORIDE 1000 ML: 0.9 INJECTION, SOLUTION INTRAVENOUS at 13:31

## 2023-08-30 RX ADMIN — METOCLOPRAMIDE 10 MG: 5 INJECTION, SOLUTION INTRAMUSCULAR; INTRAVENOUS at 14:01

## 2023-08-30 RX ADMIN — DIPHENHYDRAMINE HYDROCHLORIDE 25 MG: 50 INJECTION, SOLUTION INTRAMUSCULAR; INTRAVENOUS at 13:59

## 2023-08-30 RX ADMIN — KETOROLAC TROMETHAMINE 15 MG: 30 INJECTION, SOLUTION INTRAMUSCULAR at 13:57

## 2023-08-30 NOTE — ED PROVIDER NOTES
History  Chief Complaint   Patient presents with   • Headache - Recurrent or Known Dx Migraines     Headache starting yesterday. Hx migraines. Taking Rizatripan. However, injection was 4 days late month. Patient presents the emergency department with a headache has a history of having migraines. Patient states symptoms started yesterday. No injury or trauma. Has mild photo and phonophobia. Also is having some pain down into her neck and states she feels a lump there. Patient states she took her migraine medicine-rizatriptan. Patient states symptoms are similar to some of her other migraines but states that the pain just has not gone away and so she was concerned and came in for evaluation. Patient states she has been taking all other medications as prescribed. She has not had any cough congestion or fevers. Prior to Admission Medications   Prescriptions Last Dose Informant Patient Reported? Taking?    Ascorbic Acid (vitamin C) 1000 MG tablet   No No   Sig: Take 1 tablet (1,000 mg total) by mouth 2 (two) times a day for 7 days   Multiple Vitamin (multivitamin) capsule   No No   Sig: Take 1 capsule by mouth daily for 7 days   acetaminophen (TYLENOL) 325 mg tablet   No No   Sig: Take 3 tablets (975 mg total) by mouth every 6 (six) hours as needed for mild pain or fever   albuterol (2.5 mg/3 mL) 0.083 % nebulizer solution   No No   Sig: Take 3 mL (2.5 mg total) by nebulization every 6 (six) hours as needed for wheezing or shortness of breath   albuterol (ProAir HFA) 90 mcg/act inhaler   No No   Sig: Inhale 2 puffs every 6 (six) hours as needed for wheezing   aluminum-magnesium hydroxide-simethicone (MAALOX) 200-200-20 MG/5ML SUSP   No No   Sig: Take 15 mL by mouth 4 (four) times a day (before meals and at bedtime)   buPROPion (WELLBUTRIN SR) 200 MG 12 hr tablet   Yes No   Sig: Take 200 mg by mouth daily   cholecalciferol (VITAMIN D3) 1,000 units tablet   No No   Sig: Take 2 tablets (2,000 Units total) by mouth daily for 7 days   diclofenac sodium (VOLTAREN) 1 %   No No   Sig: Apply 2 g topically 4 (four) times a day   escitalopram (LEXAPRO) 20 mg tablet   Yes No   Sig: Take 5 mg by mouth daily   gabapentin (NEURONTIN) 600 MG tablet   Yes No   Sig: Take 600 mg by mouth 3 (three) times a day   guaiFENesin (ROBITUSSIN) 100 MG/5ML oral liquid   No No   Sig: Take 5-10 mL (100-200 mg total) by mouth every 4 (four) hours as needed for cough   ibuprofen (MOTRIN) 400 mg tablet   No No   Sig: Take 1 tablet (400 mg total) by mouth every 6 (six) hours as needed for mild pain, moderate pain or fever   ibuprofen (MOTRIN) 600 mg tablet   No No   Sig: Take 1 tablet (600 mg total) by mouth every 6 (six) hours as needed for mild pain   ipratropium (ATROVENT) 0.02 % nebulizer solution   No No   Sig: Take 1 vial (0.5 mg total) by nebulization 4 (four) times a day   lidocaine (Lidoderm) 5 %   No No   Sig: Apply 1 patch topically daily Remove & Discard patch within 12 hours or as directed by MD   loratadine (CLARITIN) 10 mg tablet   No No   Sig: Take 1 tablet (10 mg total) by mouth daily   metFORMIN (GLUCOPHAGE) 850 mg tablet   Yes No   Sig: Take 850 mg by mouth daily with breakfast   methocarbamol (ROBAXIN) 500 mg tablet   No No   Sig: Take 1 tablet (500 mg total) by mouth 2 (two) times a day   nystatin (MYCOSTATIN) powder   No No   Sig: Apply topically 4 (four) times a day   ondansetron (ZOFRAN) 4 mg tablet   No No   Sig: Take 1 tablet (4 mg total) by mouth every 6 (six) hours   pantoprazole (PROTONIX) 20 mg tablet   No No   Sig: Take 1 tablet (20 mg total) by mouth daily   traMADol (ULTRAM) 50 mg tablet   Yes No   Sig: Take 50 mg by mouth every 6 (six) hours as needed for moderate pain      Facility-Administered Medications: None       Past Medical History:   Diagnosis Date   • Anxiety    • Arthritis    • Bipolar disease, chronic (HCC)    • Diabetes mellitus (HCC)        Past Surgical History:   Procedure Laterality Date   • NO PAST SURGERIES         History reviewed. No pertinent family history. I have reviewed and agree with the history as documented. E-Cigarette/Vaping   • E-Cigarette Use Never User      E-Cigarette/Vaping Substances     Social History     Tobacco Use   • Smoking status: Every Day     Packs/day: 0.50     Types: Cigarettes   • Smokeless tobacco: Never   Vaping Use   • Vaping Use: Never used   Substance Use Topics   • Alcohol use: Never   • Drug use: Yes     Types: Marijuana     Comment: rare       Review of Systems   Constitutional: Negative for fever. Eyes: Negative for visual disturbance. Respiratory: Negative. Cardiovascular: Negative. Gastrointestinal: Positive for nausea. Negative for abdominal pain and vomiting. Genitourinary: Negative. Neurological: Positive for headaches. Negative for numbness. All other systems reviewed and are negative. Physical Exam  Physical Exam  Vitals and nursing note reviewed. Constitutional:       Appearance: She is well-developed. HENT:      Head: Normocephalic and atraumatic. Right Ear: External ear normal.      Left Ear: External ear normal.   Eyes:      Conjunctiva/sclera: Conjunctivae normal.   Cardiovascular:      Rate and Rhythm: Normal rate and regular rhythm. Heart sounds: Normal heart sounds. Pulmonary:      Effort: Pulmonary effort is normal.      Breath sounds: Normal breath sounds. Abdominal:      General: Bowel sounds are normal.      Palpations: Abdomen is soft. Musculoskeletal:         General: Normal range of motion. Cervical back: Neck supple. Spasms and tenderness present. Back:    Lymphadenopathy:      Cervical: No cervical adenopathy. Skin:     General: Skin is warm. Findings: No rash. Neurological:      Mental Status: She is alert. Cranial Nerves: No cranial nerve deficit. Motor: No weakness.       Coordination: Coordination normal.   Psychiatric:         Behavior: Behavior normal. Vital Signs  ED Triage Vitals [08/30/23 1309]   Temperature Pulse Respirations Blood Pressure SpO2   98 °F (36.7 °C) (!) 117 18 148/87 99 %      Temp src Heart Rate Source Patient Position - Orthostatic VS BP Location FiO2 (%)   -- -- -- -- --      Pain Score       10 - Worst Possible Pain           Vitals:    08/30/23 1309   BP: 148/87   Pulse: (!) 117         Visual Acuity      ED Medications  Medications   sodium chloride 0.9 % bolus 1,000 mL (0 mL Intravenous Stopped 8/30/23 1431)   ketorolac (TORADOL) injection 15 mg (15 mg Intravenous Given 8/30/23 1357)   metoclopramide (REGLAN) injection 10 mg (10 mg Intravenous Given 8/30/23 1401)   diphenhydrAMINE (BENADRYL) injection 25 mg (25 mg Intravenous Given 8/30/23 1359)       Diagnostic Studies  Results Reviewed     Procedure Component Value Units Date/Time    Urine Microscopic [[id number]]  (Abnormal) Collected: 08/30/23 1352    Lab Status: Final result Specimen: Urine, Clean Catch Updated: 08/30/23 1427     RBC, UA 2-4 /hpf      WBC, UA 1-2 /hpf      Epithelial Cells Occasional /hpf      Bacteria, UA Occasional /hpf      MUCUS THREADS Occasional    Basic metabolic panel [[id number]] Collected: 08/30/23 1335    Lab Status: Final result Specimen: Blood from Hand, Right Updated: 08/30/23 1357     Sodium 137 mmol/L      Potassium 3.9 mmol/L      Chloride 107 mmol/L      CO2 22 mmol/L      ANION GAP 8 mmol/L      BUN 11 mg/dL      Creatinine 0.66 mg/dL      Glucose 112 mg/dL      Calcium 9.4 mg/dL      eGFR 112 ml/min/1.73sq m     Narrative:      [place] guidelines for Chronic Kidney Disease (CKD):   •  Stage 1 with normal or high GFR (GFR > 90 mL/min/1.73 square meters)  •  Stage 2 Mild CKD (GFR = 60-89 mL/min/1.73 square meters)  •  Stage 3A Moderate CKD (GFR = 45-59 mL/min/1.73 square meters)  •  Stage 3B Moderate CKD (GFR = 30-44 mL/min/1.73 square meters)  •  Stage 4 Severe CKD (GFR = 15-29 mL/min/1.73 square meters)  •  Stage 5 End Stage CKD (GFR <15 mL/min/1.73 square meters)  Note: GFR calculation is accurate only with a steady state creatinine    POCT pregnancy, urine [[id number]]  (Normal) Resulted: 08/30/23 1356    Lab Status: Final result Updated: 08/30/23 1356     EXT Preg Test, Ur Negative     Control Valid    Urine Macroscopic, POC [[id number]]  (Abnormal) Collected: 08/30/23 1352    Lab Status: Final result Specimen: Urine Updated: 08/30/23 1354     Color, UA Yellow     Clarity, UA Clear     pH, UA 5.5     Leukocytes, UA Negative     Nitrite, UA Negative     Protein, UA Negative mg/dl      Glucose, UA Negative mg/dl      Ketones, UA Negative mg/dl      Urobilinogen, UA 0.2 E.U./dl      Bilirubin, UA Negative     Occult Blood, UA Trace     Specific Gravity, UA >=1.030    Narrative:      CLINITEK RESULT    CBC and differential [[id number]]  (Abnormal) Collected: 08/30/23 1335    Lab Status: Final result Specimen: Blood from Hand, Right Updated: 08/30/23 1341     WBC 12.61 Thousand/uL      RBC 4.60 Million/uL      Hemoglobin 11.1 g/dL      Hematocrit 36.4 %      MCV 79 fL      MCH 24.1 pg      MCHC 30.5 g/dL      RDW 18.5 %      MPV 10.5 fL      Platelets 972 Thousands/uL      nRBC 0 /100 WBCs      Neutrophils Relative 60 %      Immat GRANS % 0 %      Lymphocytes Relative 33 %      Monocytes Relative 4 %      Eosinophils Relative 2 %      Basophils Relative 1 %      Neutrophils Absolute 7.65 Thousands/µL      Immature Grans Absolute 0.05 Thousand/uL      Lymphocytes Absolute 4.11 Thousands/µL      Monocytes Absolute 0.44 Thousand/µL      Eosinophils Absolute 0.27 Thousand/µL      Basophils Absolute 0.09 Thousands/µL                  No orders to display              Procedures  Procedures         ED Course  ED Course as of 08/30/23 1621   Wed Aug 30, 2023   1403 Hemoglobin(!): 11.1  Mild anemia- discussed with pt - she just finished menstural cyccle - discussed PCP FU   1404 Creatinine: 0.66  Normal    1418 Pt pain relieved - resting comfortably - pt feeling better. Medical Decision Making  We will check urine basic labs and give migraine cocktail. Hx of migraine/similar hx. Will tx. Acute headache: acute illness or injury  Anemia: acute illness or injury  Amount and/or Complexity of Data Reviewed  Independent Historian: spouse     Details: provided hx of her symptoms since yesterday   External Data Reviewed: notes. Labs: ordered. Decision-making details documented in ED Course. Risk  Prescription drug management. Risk Details: Pt symptoms resolved w meds. instructions reviewed. Disposition  Final diagnoses:   Acute headache   Anemia     Time reflects when diagnosis was documented in both MDM as applicable and the Disposition within this note     Time User Action Codes Description Comment    8/30/2023  2:21 PM Ericka Briones [R51.9] Acute headache     8/30/2023  2:39 PM Ericka Briones [D64.9] Anemia       ED Disposition     ED Disposition   Discharge    Condition   Stable    Date/Time   Wed Aug 30, 2023  2:20 PM    Comment   Ruben Claudio discharge to home/self care.                Follow-up Information     Follow up With Specialties Details Why [address] [facility]   [address] [facility]  [phone]            Discharge Medication List as of 8/30/2023  2:39 PM      START taking these medications    Details   metoclopramide (Reglan) 10 mg tablet Take 1 tablet (10 mg total) by mouth every 6 (six) hours PRN nausea, HA, Starting Wed 8/30/2023, Normal      naproxen (Naprosyn) 500 mg tablet Take 1 tablet (500 mg total) by mouth 2 (two) times a day with meals PRN CASAREZ, Starting Wed 8/30/2023, Normal         CONTINUE these medications which have NOT CHANGED    Details   acetaminophen (TYLENOL) 325 mg tablet Take 3 tablets (975 mg total) by mouth every 6 (six) hours as needed for mild pain or fever, Starting Fri 2/28/2020, Normal      albuterol (2.5 mg/3 mL) 0.083 % nebulizer solution Take 3 mL (2.5 mg total) by nebulization every 6 (six) hours as needed for wheezing or shortness of breath, Starting Tue 10/19/2021, Print      albuterol (ProAir HFA) 90 mcg/act inhaler Inhale 2 puffs every 6 (six) hours as needed for wheezing, Starting Tue 10/19/2021, Print      aluminum-magnesium hydroxide-simethicone (MAALOX) 456-640-20 MG/5ML SUSP Take 15 mL by mouth 4 (four) times a day (before meals and at bedtime), Starting Sun 5/19/2019, Print      Ascorbic Acid (vitamin C) 1000 MG tablet Take 1 tablet (1,000 mg total) by mouth 2 (two) times a day for 7 days, Starting Sun 12/20/2020, Until Fri 9/2/2022, Normal      buPROPion (WELLBUTRIN SR) 200 MG 12 hr tablet Take 200 mg by mouth daily, Historical Med      cholecalciferol (VITAMIN D3) 1,000 units tablet Take 2 tablets (2,000 Units total) by mouth daily for 7 days, Starting Sun 12/20/2020, Until Fri 9/2/2022, Normal      diclofenac sodium (VOLTAREN) 1 % Apply 2 g topically 4 (four) times a day, Starting Mon 1/20/2020, Normal      escitalopram (LEXAPRO) 20 mg tablet Take 5 mg by mouth daily, Historical Med      gabapentin (NEURONTIN) 600 MG tablet Take 600 mg by mouth 3 (three) times a day, Historical Med      guaiFENesin (ROBITUSSIN) 100 MG/5ML oral liquid Take 5-10 mL (100-200 mg total) by mouth every 4 (four) hours as needed for cough, Starting Sun 12/20/2020, Normal      !! ibuprofen (MOTRIN) 400 mg tablet Take 1 tablet (400 mg total) by mouth every 6 (six) hours as needed for mild pain, moderate pain or fever, Starting Sat 10/13/2018, Normal      !! ibuprofen (MOTRIN) 600 mg tablet Take 1 tablet (600 mg total) by mouth every 6 (six) hours as needed for mild pain, Starting Fri 2/28/2020, Normal      ipratropium (ATROVENT) 0.02 % nebulizer solution Take 1 vial (0.5 mg total) by nebulization 4 (four) times a day, Starting Mon 9/7/2020, Normal      lidocaine (Lidoderm) 5 % Apply 1 patch topically daily Remove & Discard patch within 12 hours or as directed by MD, Starting Fri 9/2/2022, Normal      loratadine (CLARITIN) 10 mg tablet Take 1 tablet (10 mg total) by mouth daily, Starting Mon 9/7/2020, Normal      metFORMIN (GLUCOPHAGE) 850 mg tablet Take 850 mg by mouth daily with breakfast, Historical Med      methocarbamol (ROBAXIN) 500 mg tablet Take 1 tablet (500 mg total) by mouth 2 (two) times a day, Starting Fri 9/2/2022, Normal      Multiple Vitamin (multivitamin) capsule Take 1 capsule by mouth daily for 7 days, Starting Sun 12/20/2020, Until Sun 12/27/2020, Normal      nystatin (MYCOSTATIN) powder Apply topically 4 (four) times a day, Starting Mon 3/27/2023, Normal      ondansetron (ZOFRAN) 4 mg tablet Take 1 tablet (4 mg total) by mouth every 6 (six) hours, Starting Fri 2/28/2020, Normal      pantoprazole (PROTONIX) 20 mg tablet Take 1 tablet (20 mg total) by mouth daily, Starting Mon 9/30/2019, Print      traMADol (ULTRAM) 50 mg tablet Take 50 mg by mouth every 6 (six) hours as needed for moderate pain, Historical Med       !! - Potential duplicate medications found. Please discuss with provider. No discharge procedures on file.     PDMP Review     None          ED Provider  Electronically Signed by           Shakira Medeiros PA-C  08/30/23 2080

## 2023-08-30 NOTE — Clinical Note
Kaden Patel was seen and treated in our emergency department on 8/30/2023. Diagnosis:     Dalinette  . She may return on this date: 08/31/2023         If you have any questions or concerns, please don't hesitate to call.       Ericka Briones PA-C    ______________________________           _______________          _______________  Hospital Representative                              Date                                Time

## 2023-12-09 ENCOUNTER — HOSPITAL ENCOUNTER (EMERGENCY)
Facility: HOSPITAL | Age: 38
Discharge: HOME | End: 2023-12-09
Attending: EMERGENCY MEDICINE
Payer: COMMERCIAL

## 2023-12-09 ENCOUNTER — APPOINTMENT (EMERGENCY)
Dept: RADIOLOGY | Facility: HOSPITAL | Age: 38
End: 2023-12-09
Payer: COMMERCIAL

## 2023-12-09 VITALS
HEART RATE: 96 BPM | TEMPERATURE: 98.2 F | DIASTOLIC BLOOD PRESSURE: 57 MMHG | WEIGHT: 251.32 LBS | BODY MASS INDEX: 45.97 KG/M2 | SYSTOLIC BLOOD PRESSURE: 105 MMHG | OXYGEN SATURATION: 100 % | RESPIRATION RATE: 21 BRPM

## 2023-12-09 DIAGNOSIS — J06.9 VIRAL URI WITH COUGH: Primary | ICD-10-CM

## 2023-12-09 LAB
ATRIAL RATE: 82 BPM
FLUAV RNA RESP QL NAA+PROBE: NEGATIVE
FLUBV RNA RESP QL NAA+PROBE: NEGATIVE
P AXIS: 66 DEGREES
PR INTERVAL: 126 MS
QRS AXIS: 53 DEGREES
QRSD INTERVAL: 78 MS
QT INTERVAL: 392 MS
QTC INTERVAL: 457 MS
RSV RNA RESP QL NAA+PROBE: NEGATIVE
SARS-COV-2 RNA RESP QL NAA+PROBE: NEGATIVE
T WAVE AXIS: 35 DEGREES
VENTRICULAR RATE: 82 BPM

## 2023-12-09 PROCEDURE — 0241U HB NFCT DS VIR RESP RNA 4 TRGT: CPT | Performed by: EMERGENCY MEDICINE

## 2023-12-09 PROCEDURE — 94640 AIRWAY INHALATION TREATMENT: CPT

## 2023-12-09 PROCEDURE — 93005 ELECTROCARDIOGRAM TRACING: CPT

## 2023-12-09 PROCEDURE — 99284 EMERGENCY DEPT VISIT MOD MDM: CPT | Performed by: EMERGENCY MEDICINE

## 2023-12-09 PROCEDURE — 99285 EMERGENCY DEPT VISIT HI MDM: CPT

## 2023-12-09 PROCEDURE — 71046 X-RAY EXAM CHEST 2 VIEWS: CPT

## 2023-12-09 RX ORDER — ALBUTEROL SULFATE 2.5 MG/3ML
5 SOLUTION RESPIRATORY (INHALATION) ONCE
Status: COMPLETED | OUTPATIENT
Start: 2023-12-09 | End: 2023-12-09

## 2023-12-09 RX ORDER — GUAIFENESIN 600 MG/1
600 TABLET, EXTENDED RELEASE ORAL ONCE
Status: COMPLETED | OUTPATIENT
Start: 2023-12-09 | End: 2023-12-09

## 2023-12-09 RX ADMIN — GUAIFENESIN 600 MG: 600 TABLET, EXTENDED RELEASE ORAL at 15:38

## 2023-12-09 RX ADMIN — IPRATROPIUM BROMIDE 0.5 MG: 0.5 SOLUTION RESPIRATORY (INHALATION) at 15:38

## 2023-12-09 RX ADMIN — ALBUTEROL SULFATE 5 MG: 2.5 SOLUTION RESPIRATORY (INHALATION) at 15:38

## 2023-12-09 NOTE — ED PROVIDER NOTES
History  Chief Complaint   Patient presents with    Chest Pain     Chest pain (substernal) that started yesterday with SOB and wheezing. Describes pain as "sharp"     46 yo F h/o asthma, NIDDM; presenting for evaluation of 2 days of URI sx. Reports nasal congestion/rhinorrhea, cough occasionally productive. Reports feeling her asthma is acting up, has been using her albuterol. No known sick contacts  Denies fevers, chills, abdominal pain, N/V/D/C, leg pain/swelling                  Prior to Admission Medications   Prescriptions Last Dose Informant Patient Reported? Taking?    Ascorbic Acid (vitamin C) 1000 MG tablet   No No   Sig: Take 1 tablet (1,000 mg total) by mouth 2 (two) times a day for 7 days   Multiple Vitamin (multivitamin) capsule   No No   Sig: Take 1 capsule by mouth daily for 7 days   acetaminophen (TYLENOL) 325 mg tablet   No No   Sig: Take 3 tablets (975 mg total) by mouth every 6 (six) hours as needed for mild pain or fever   albuterol (2.5 mg/3 mL) 0.083 % nebulizer solution   No No   Sig: Take 3 mL (2.5 mg total) by nebulization every 6 (six) hours as needed for wheezing or shortness of breath   albuterol (ProAir HFA) 90 mcg/act inhaler   No No   Sig: Inhale 2 puffs every 6 (six) hours as needed for wheezing   aluminum-magnesium hydroxide-simethicone (MAALOX) 200-200-20 MG/5ML SUSP   No No   Sig: Take 15 mL by mouth 4 (four) times a day (before meals and at bedtime)   buPROPion (WELLBUTRIN SR) 200 MG 12 hr tablet   Yes No   Sig: Take 200 mg by mouth daily   cholecalciferol (VITAMIN D3) 1,000 units tablet   No No   Sig: Take 2 tablets (2,000 Units total) by mouth daily for 7 days   diclofenac sodium (VOLTAREN) 1 %   No No   Sig: Apply 2 g topically 4 (four) times a day   escitalopram (LEXAPRO) 20 mg tablet   Yes No   Sig: Take 5 mg by mouth daily   gabapentin (NEURONTIN) 600 MG tablet   Yes No   Sig: Take 600 mg by mouth 3 (three) times a day   guaiFENesin (ROBITUSSIN) 100 MG/5ML oral liquid No No   Sig: Take 5-10 mL (100-200 mg total) by mouth every 4 (four) hours as needed for cough   ibuprofen (MOTRIN) 400 mg tablet   No No   Sig: Take 1 tablet (400 mg total) by mouth every 6 (six) hours as needed for mild pain, moderate pain or fever   ibuprofen (MOTRIN) 600 mg tablet   No No   Sig: Take 1 tablet (600 mg total) by mouth every 6 (six) hours as needed for mild pain   ipratropium (ATROVENT) 0.02 % nebulizer solution   No No   Sig: Take 1 vial (0.5 mg total) by nebulization 4 (four) times a day   lidocaine (Lidoderm) 5 %   No No   Sig: Apply 1 patch topically daily Remove & Discard patch within 12 hours or as directed by MD   loratadine (CLARITIN) 10 mg tablet   No No   Sig: Take 1 tablet (10 mg total) by mouth daily   metFORMIN (GLUCOPHAGE) 850 mg tablet   Yes No   Sig: Take 850 mg by mouth daily with breakfast   methocarbamol (ROBAXIN) 500 mg tablet   No No   Sig: Take 1 tablet (500 mg total) by mouth 2 (two) times a day   metoclopramide (Reglan) 10 mg tablet   No No   Sig: Take 1 tablet (10 mg total) by mouth every 6 (six) hours PRN nausea, HA   naproxen (Naprosyn) 500 mg tablet   No No   Sig: Take 1 tablet (500 mg total) by mouth 2 (two) times a day with meals PRN HA   nystatin (MYCOSTATIN) powder   No No   Sig: Apply topically 4 (four) times a day   ondansetron (ZOFRAN) 4 mg tablet   No No   Sig: Take 1 tablet (4 mg total) by mouth every 6 (six) hours   pantoprazole (PROTONIX) 20 mg tablet   No No   Sig: Take 1 tablet (20 mg total) by mouth daily   traMADol (ULTRAM) 50 mg tablet   Yes No   Sig: Take 50 mg by mouth every 6 (six) hours as needed for moderate pain      Facility-Administered Medications: None       Past Medical History:   Diagnosis Date    Anxiety     Arthritis     Bipolar disease, chronic (HCC)     Diabetes mellitus ([address])        Past Surgical History:   Procedure Laterality Date    NO PAST SURGERIES         History reviewed. No pertinent family history.   I have reviewed and agree with the history as documented. E-Cigarette/Vaping    E-Cigarette Use Never User      E-Cigarette/Vaping Substances     Social History     Tobacco Use    Smoking status: Every Day     Packs/day: 0.50     Types: Cigarettes    Smokeless tobacco: Never   Vaping Use    Vaping Use: Never used   Substance Use Topics    Alcohol use: Never    Drug use: Yes     Types: Marijuana     Comment: rare       Review of Systems    Physical Exam  Physical Exam  Vitals and nursing note reviewed. Constitutional:       General: She is not in acute distress. Appearance: Normal appearance. She is well-developed. HENT:      Head: Normocephalic and atraumatic. Nose: Mucosal edema present. Eyes:      Extraocular Movements: Extraocular movements intact. Conjunctiva/sclera: Conjunctivae normal.   Cardiovascular:      Rate and Rhythm: Normal rate and regular rhythm. Heart sounds: Normal heart sounds. Pulmonary:      Effort: Pulmonary effort is normal. No respiratory distress. Breath sounds: Normal breath sounds. No stridor. No wheezing or rales. Chest:      Chest wall: No tenderness. Abdominal:      General: There is no distension. Palpations: Abdomen is soft. Tenderness: There is no abdominal tenderness. There is no guarding or rebound. Musculoskeletal:         General: No swelling, tenderness or deformity. Cervical back: Normal range of motion and neck supple. Skin:     General: Skin is warm and dry. Findings: No rash. Neurological:      General: No focal deficit present. Mental Status: She is alert and oriented to person, place, and time. Motor: No abnormal muscle tone. Coordination: Coordination normal.   Psychiatric:         Thought Content:  Thought content normal.         Judgment: Judgment normal.         Vital Signs  ED Triage Vitals [12/09/23 1440]   Temperature Pulse Respirations Blood Pressure SpO2   98.2 °F (36.8 °C) 87 18 130/62 96 %      Temp Source Heart Rate Source Patient Position - Orthostatic VS BP Location FiO2 (%)   Oral Monitor Lying Right arm --      Pain Score       7           Vitals:    12/09/23 1440 12/09/23 1500 12/09/23 1530 12/09/23 1545   BP: 130/62 112/75 120/74 105/57   Pulse: 87 77 84 96   Patient Position - Orthostatic VS: Lying Lying Lying Lying         Visual Acuity      ED Medications  Medications   guaiFENesin (MUCINEX) 12 hr tablet 600 mg (600 mg Oral Given 12/9/23 1538)   albuterol inhalation solution 5 mg (5 mg Nebulization Given 12/9/23 1538)   ipratropium (ATROVENT) 0.02 % inhalation solution 0.5 mg (0.5 mg Nebulization Given 12/9/23 1538)       Diagnostic Studies  Results Reviewed       Procedure Component Value Units Date/Time    FLU/RSV/COVID - if FLU/RSV clinically relevant [[id number]]  (Normal) Collected: 12/09/23 1538    Lab Status: Final result Specimen: Nares from Nose Updated: 12/09/23 1629     SARS-CoV-2 Negative     INFLUENZA A PCR Negative     INFLUENZA B PCR Negative     RSV PCR Negative    Narrative:      FOR PEDIATRIC PATIENTS - copy/paste COVID Guidelines URL to browser: [email or web address]    SARS-CoV-2 assay is a Nucleic Acid Amplification assay intended for the  qualitative detection of nucleic acid from SARS-CoV-2 in nasopharyngeal  swabs. Results are for the presumptive identification of SARS-CoV-2 RNA. Positive results are indicative of infection with SARS-CoV-2, the virus  causing COVID-19, but do not rule out bacterial infection or co-infection  with other viruses. Laboratories within the [facility] and its  territories are required to report all positive results to the appropriate  public health authorities. Negative results do not preclude SARS-CoV-2  infection and should not be used as the sole basis for treatment or other  patient management decisions.  Negative results must be combined with  clinical observations, patient history, and epidemiological information. This test has not been FDA cleared or approved. This test has been authorized by FDA under an Emergency Use Authorization  (EUA). This test is only authorized for the duration of time the  declaration that circumstances exist justifying the authorization of the  emergency use of an in vitro diagnostic tests for detection of SARS-CoV-2  virus and/or diagnosis of COVID-19 infection under section 564(b)(1) of  the Act, 21 U. S.C. 703NRG-4(K)(4), unless the authorization is terminated  or revoked sooner. The test has been validated but independent review by FDA  and CLIA is pending. Test performed using [employer] GeneXpert: This RT-PCR assay targets N2,  a region unique to SARS-CoV-2. A conserved region in the E-gene was chosen  for pan-Sarbecovirus detection which includes SARS-CoV-2. According to CMS-2020-01-R, this platform meets the definition of high-throughput technology. XR chest 2 views    (Results Pending)              Procedures  Procedures         ED Course  ED Course as of 12/09/23 1804   Sat Dec 09, 2023   1541 CXR independently interpreted by myself: no acute abn                                SBIRT 20yo+      Flowsheet Row Most Recent Value   Initial Alcohol Screen: US AUDIT-C     1. How often do you have a drink containing alcohol? 0 Filed at: 12/09/2023 1554   2. How many drinks containing alcohol do you have on a typical day you are drinking? 0 Filed at: 12/09/2023 1554   3b. FEMALE Any Age, or MALE 65+: How often do you have 4 or more drinks on one occassion? 0 Filed at: 12/09/2023 1554   Audit-C Score 0 Filed at: 12/09/2023 1554   JONNIE: How many times in the past year have you. .. Used an illegal drug or used a prescription medication for non-medical reasons?  Never Filed at: 12/09/2023 1554                      Medical Decision Making  44 yo F with URI sx/cough- will treat sx, CXR to r/o PNA, COVID/Flu/RSV testing    Discussed sx management, PCP f/u and return precautions    Problems Addressed:  Viral URI with cough: acute illness or injury    Amount and/or Complexity of Data Reviewed  Labs: ordered. Radiology: ordered and independent interpretation performed. Decision-making details documented in ED Course. Risk  OTC drugs. Prescription drug management. Disposition  Final diagnoses:   Viral URI with cough     Time reflects when diagnosis was documented in both MDM as applicable and the Disposition within this note       Time User Action Codes Description Comment    12/9/2023  3:43 PM Nilda Flores [J06.9] Viral URI with cough           ED Disposition       ED Disposition   Discharge    Condition   Stable    Date/Time   Sat Dec 9, 2023 [address] discharge to home/self care.                    Follow-up Information       Follow up With Specialties Details Why [address] [place] [address] [facility]   [facility]  [phone]              Discharge Medication List as of 12/9/2023  3:54 PM        CONTINUE these medications which have NOT CHANGED    Details   acetaminophen (TYLENOL) 325 mg tablet Take 3 tablets (975 mg total) by mouth every 6 (six) hours as needed for mild pain or fever, Starting Fri 2/28/2020, Normal      albuterol (2.5 mg/3 mL) 0.083 % nebulizer solution Take 3 mL (2.5 mg total) by nebulization every 6 (six) hours as needed for wheezing or shortness of breath, Starting Tue 10/19/2021, Print      albuterol (ProAir HFA) 90 mcg/act inhaler Inhale 2 puffs every 6 (six) hours as needed for wheezing, Starting Tue 10/19/2021, Print      aluminum-magnesium hydroxide-simethicone (MAALOX) 200-200-20 MG/5ML SUSP Take 15 mL by mouth 4 (four) times a day (before meals and at bedtime), Starting Sun 5/19/2019, Print      Ascorbic Acid (vitamin C) 1000 MG tablet Take 1 tablet (1,000 mg total) by mouth 2 (two) times a day for 7 days, Starting Sun 12/20/2020, Until Fri 9/2/2022, Normal      buPROPion (WELLBUTRIN SR) 200 MG 12 hr tablet Take 200 mg by mouth daily, Historical Med      cholecalciferol (VITAMIN D3) 1,000 units tablet Take 2 tablets (2,000 Units total) by mouth daily for 7 days, Starting Sun 12/20/2020, Until Fri 9/2/2022, Normal      diclofenac sodium (VOLTAREN) 1 % Apply 2 g topically 4 (four) times a day, Starting Mon 1/20/2020, Normal      escitalopram (LEXAPRO) 20 mg tablet Take 5 mg by mouth daily, Historical Med      gabapentin (NEURONTIN) 600 MG tablet Take 600 mg by mouth 3 (three) times a day, Historical Med      guaiFENesin (ROBITUSSIN) 100 MG/5ML oral liquid Take 5-10 mL (100-200 mg total) by mouth every 4 (four) hours as needed for cough, Starting Sun 12/20/2020, Normal      !! ibuprofen (MOTRIN) 400 mg tablet Take 1 tablet (400 mg total) by mouth every 6 (six) hours as needed for mild pain, moderate pain or fever, Starting Sat 10/13/2018, Normal      !! ibuprofen (MOTRIN) 600 mg tablet Take 1 tablet (600 mg total) by mouth every 6 (six) hours as needed for mild pain, Starting Fri 2/28/2020, Normal      ipratropium (ATROVENT) 0.02 % nebulizer solution Take 1 vial (0.5 mg total) by nebulization 4 (four) times a day, Starting Mon 9/7/2020, Normal      lidocaine (Lidoderm) 5 % Apply 1 patch topically daily Remove & Discard patch within 12 hours or as directed by MD, Starting Fri 9/2/2022, Normal      loratadine (CLARITIN) 10 mg tablet Take 1 tablet (10 mg total) by mouth daily, Starting Mon 9/7/2020, Normal      metFORMIN (GLUCOPHAGE) 850 mg tablet Take 850 mg by mouth daily with breakfast, Historical Med      methocarbamol (ROBAXIN) 500 mg tablet Take 1 tablet (500 mg total) by mouth 2 (two) times a day, Starting Fri 9/2/2022, Normal      metoclopramide (Reglan) 10 mg tablet Take 1 tablet (10 mg total) by mouth every 6 (six) hours PRN nausea, HA, Starting Wed 8/30/2023, Normal      Multiple Vitamin (multivitamin) capsule Take 1 capsule by mouth daily for 7 days, Starting Sun 12/20/2020, Until Sun 12/27/2020, Normal      naproxen (Naprosyn) 500 mg tablet Take 1 tablet (500 mg total) by mouth 2 (two) times a day with meals PRN CASAREZ, Starting Wed 8/30/2023, Normal      nystatin (MYCOSTATIN) powder Apply topically 4 (four) times a day, Starting Mon 3/27/2023, Normal      ondansetron (ZOFRAN) 4 mg tablet Take 1 tablet (4 mg total) by mouth every 6 (six) hours, Starting Fri 2/28/2020, Normal      pantoprazole (PROTONIX) 20 mg tablet Take 1 tablet (20 mg total) by mouth daily, Starting Mon 9/30/2019, Print      traMADol (ULTRAM) 50 mg tablet Take 50 mg by mouth every 6 (six) hours as needed for moderate pain, Historical Med       !! - Potential duplicate medications found. Please discuss with provider. No discharge procedures on file.     PDMP Review       None            ED Provider  Electronically Signed by             Jose J Urbina DO  12/09/23 3245

## 2023-12-09 NOTE — Clinical Note
Adi Pereyra was seen and treated in our emergency department on 12/9/2023. No restrictions            Diagnosis:     Ruben  may return to work on return date. She may return on this date: 12/10/2023         If you have any questions or concerns, please don't hesitate to call.       Joo Alves RN    ______________________________           _______________          _______________  Hospital Representative                              Date                                Time

## 2023-12-09 NOTE — DISCHARGE INSTRUCTIONS
Tylenol 650-1000 mg every 4 hours as needed (maximum 3000 mg in 24 hours)  Ibuprofen 400-600 mg every 4 hours as needed  Mucinex/honey as needed for cough    Albuterol as needed for wheezing    Stay well hydrated    COVID/Flu/RSV test in process    Follow up with family doctor    Return to the ED if new/worsening symptoms including but not limited to difficulty breathing, dehydration, passing out, etc.

## 2024-02-21 ENCOUNTER — TELEPHONE (OUTPATIENT)
Age: 39
End: 2024-02-21

## 2024-02-21 NOTE — TELEPHONE ENCOUNTER
Called pt back; No answer. Left a generic vm requesting a call back.     ----- Message from Silvia Rangel sent at 2/21/2024 11:29 AM EST -----  Regarding: Blood In Urnine  Contact: [phone]  Patient called  said she has blood in urine  and Urologist  advised her to schedule appointment

## 2024-02-22 ENCOUNTER — TELEPHONE (OUTPATIENT)
Dept: OBGYN CLINIC | Facility: CLINIC | Age: 39
End: 2024-02-22

## 2024-02-22 NOTE — TELEPHONE ENCOUNTER
----- Message from Silvia Rangel sent at 2/21/2024 11:29 AM EST -----  Regarding: Blood In Urnine  Contact: [phone]  Patient called  said she has blood in urine  and Urologist  advised her to schedule appointment

## 2024-02-22 NOTE — TELEPHONE ENCOUNTER
Patient contacted [facility], who she is already established with, to schedule appt. She wishes to stay with them.

## 2024-03-07 ENCOUNTER — HOSPITAL ENCOUNTER (EMERGENCY)
Facility: HOSPITAL | Age: 39
Discharge: HOME | End: 2024-03-07
Attending: EMERGENCY MEDICINE
Payer: COMMERCIAL

## 2024-03-07 ENCOUNTER — APPOINTMENT (EMERGENCY)
Dept: RADIOLOGY | Facility: HOSPITAL | Age: 39
End: 2024-03-07
Payer: COMMERCIAL

## 2024-03-07 VITALS
WEIGHT: 250.22 LBS | OXYGEN SATURATION: 99 % | HEART RATE: 70 BPM | DIASTOLIC BLOOD PRESSURE: 60 MMHG | BODY MASS INDEX: 45.77 KG/M2 | RESPIRATION RATE: 20 BRPM | SYSTOLIC BLOOD PRESSURE: 111 MMHG | TEMPERATURE: 98.6 F

## 2024-03-07 DIAGNOSIS — F41.9 ANXIETY: ICD-10-CM

## 2024-03-07 DIAGNOSIS — R07.9 CHEST PAIN: Primary | ICD-10-CM

## 2024-03-07 DIAGNOSIS — R35.0 URINARY FREQUENCY: ICD-10-CM

## 2024-03-07 LAB
ANION GAP SERPL CALCULATED.3IONS-SCNC: 7 MMOL/L
ATRIAL RATE: 98 BPM
BACTERIA UR QL AUTO: ABNORMAL /HPF
BASOPHILS # BLD AUTO: 0.08 THOUSANDS/ÂΜL (ref 0–0.1)
BASOPHILS NFR BLD AUTO: 1 % (ref 0–1)
BILIRUB UR QL STRIP: NEGATIVE
BUN SERPL-MCNC: 13 MG/DL (ref 5–25)
CALCIUM SERPL-MCNC: 9.1 MG/DL (ref 8.4–10.2)
CARDIAC TROPONIN I PNL SERPL HS: <2 NG/L
CHLORIDE SERPL-SCNC: 107 MMOL/L (ref 96–108)
CLARITY UR: CLEAR
CO2 SERPL-SCNC: 22 MMOL/L (ref 21–32)
COLOR UR: YELLOW
CREAT SERPL-MCNC: 0.71 MG/DL (ref 0.6–1.3)
EOSINOPHIL # BLD AUTO: 0.11 THOUSAND/ÂΜL (ref 0–0.61)
EOSINOPHIL NFR BLD AUTO: 1 % (ref 0–6)
ERYTHROCYTE [DISTWIDTH] IN BLOOD BY AUTOMATED COUNT: 17.4 % (ref 11.6–15.1)
EXT PREGNANCY TEST URINE: NEGATIVE
EXT. CONTROL: NORMAL
GFR SERPL CREATININE-BSD FRML MDRD: 107 ML/MIN/1.73SQ M
GLUCOSE SERPL-MCNC: 141 MG/DL (ref 65–140)
GLUCOSE UR STRIP-MCNC: NEGATIVE MG/DL
HCT VFR BLD AUTO: 39.6 % (ref 34.8–46.1)
HGB BLD-MCNC: 12.6 G/DL (ref 11.5–15.4)
HGB UR QL STRIP.AUTO: ABNORMAL
IMM GRANULOCYTES # BLD AUTO: 0.09 THOUSAND/UL (ref 0–0.2)
IMM GRANULOCYTES NFR BLD AUTO: 1 % (ref 0–2)
KETONES UR STRIP-MCNC: NEGATIVE MG/DL
LEUKOCYTE ESTERASE UR QL STRIP: NEGATIVE
LYMPHOCYTES # BLD AUTO: 3.42 THOUSANDS/ÂΜL (ref 0.6–4.47)
LYMPHOCYTES NFR BLD AUTO: 23 % (ref 14–44)
MCH RBC QN AUTO: 26.5 PG (ref 26.8–34.3)
MCHC RBC AUTO-ENTMCNC: 31.8 G/DL (ref 31.4–37.4)
MCV RBC AUTO: 83 FL (ref 82–98)
MONOCYTES # BLD AUTO: 0.69 THOUSAND/ÂΜL (ref 0.17–1.22)
MONOCYTES NFR BLD AUTO: 5 % (ref 4–12)
MUCOUS THREADS UR QL AUTO: ABNORMAL
NEUTROPHILS # BLD AUTO: 10.8 THOUSANDS/ÂΜL (ref 1.85–7.62)
NEUTS SEG NFR BLD AUTO: 69 % (ref 43–75)
NITRITE UR QL STRIP: NEGATIVE
NON-SQ EPI CELLS URNS QL MICRO: ABNORMAL /HPF
NRBC BLD AUTO-RTO: 0 /100 WBCS
P AXIS: 83 DEGREES
PH UR STRIP.AUTO: 5.5 [PH] (ref 4.5–8)
PLATELET # BLD AUTO: 284 THOUSANDS/UL (ref 149–390)
PMV BLD AUTO: 10.3 FL (ref 8.9–12.7)
POTASSIUM SERPL-SCNC: 3.9 MMOL/L (ref 3.5–5.3)
PR INTERVAL: 118 MS
PROT UR STRIP-MCNC: ABNORMAL MG/DL
QRS AXIS: 73 DEGREES
QRSD INTERVAL: 80 MS
QT INTERVAL: 378 MS
QTC INTERVAL: 482 MS
RBC # BLD AUTO: 4.75 MILLION/UL (ref 3.81–5.12)
RBC #/AREA URNS AUTO: ABNORMAL /HPF
SODIUM SERPL-SCNC: 136 MMOL/L (ref 135–147)
SP GR UR STRIP.AUTO: >=1.03 (ref 1–1.03)
T WAVE AXIS: 55 DEGREES
UROBILINOGEN UR QL STRIP.AUTO: 1 E.U./DL
VENTRICULAR RATE: 98 BPM
WBC # BLD AUTO: 15.19 THOUSAND/UL (ref 4.31–10.16)
WBC #/AREA URNS AUTO: ABNORMAL /HPF

## 2024-03-07 PROCEDURE — 99285 EMERGENCY DEPT VISIT HI MDM: CPT

## 2024-03-07 PROCEDURE — 99285 EMERGENCY DEPT VISIT HI MDM: CPT | Performed by: PHYSICIAN ASSISTANT

## 2024-03-07 PROCEDURE — 84484 ASSAY OF TROPONIN QUANT: CPT | Performed by: PHYSICIAN ASSISTANT

## 2024-03-07 PROCEDURE — 80048 BASIC METABOLIC PNL TOTAL CA: CPT | Performed by: PHYSICIAN ASSISTANT

## 2024-03-07 PROCEDURE — 71045 X-RAY EXAM CHEST 1 VIEW: CPT

## 2024-03-07 PROCEDURE — 81025 URINE PREGNANCY TEST: CPT | Performed by: PHYSICIAN ASSISTANT

## 2024-03-07 PROCEDURE — 93010 ELECTROCARDIOGRAM REPORT: CPT

## 2024-03-07 PROCEDURE — 81001 URINALYSIS AUTO W/SCOPE: CPT

## 2024-03-07 PROCEDURE — 85025 COMPLETE CBC W/AUTO DIFF WBC: CPT | Performed by: PHYSICIAN ASSISTANT

## 2024-03-07 PROCEDURE — 36415 COLL VENOUS BLD VENIPUNCTURE: CPT | Performed by: PHYSICIAN ASSISTANT

## 2024-03-07 PROCEDURE — 93005 ELECTROCARDIOGRAM TRACING: CPT

## 2024-03-07 RX ORDER — HYDROXYZINE HYDROCHLORIDE 25 MG/1
25 TABLET, FILM COATED ORAL EVERY 6 HOURS
Qty: 15 TABLET | Refills: 0 | Status: SHIPPED | OUTPATIENT
Start: 2024-03-07

## 2024-03-07 NOTE — Clinical Note
David Tamayo accompanied Ruben Garner to the emergency department on 3/7/2024.    Return date if applicable: 03/08/2024        If you have any questions or concerns, please don't hesitate to call.      José Lynn RN

## 2024-03-07 NOTE — Clinical Note
Ruben Garner was seen and treated in our emergency department on 3/7/2024.                Diagnosis:     Ruben  .    She may return on this date:     Please excuse patient from absence on 3/8/24     If you have any questions or concerns, please don't hesitate to call.      Simeon Amor PA-C    ______________________________           _______________          _______________  Hospital Representative                              Date                                Time

## 2024-03-07 NOTE — ED PROVIDER NOTES
History  Chief Complaint   Patient presents with    Chest Pain     Pt c/o cp with dizziness and nausea for the past x 1 day. Pt denies sob/v/d or fevers     Ruben is a 38 yo F, history of DM, bipolar, anxiety, presenting with R sided chest pain which began yesterday as well as one day of urinary frequency and suprapubic abdominal cramping. Reports the chest pain is sharp, and waxes/wanes since onset. Non-exertional, non-pleuritic. No significant dyspnea associated. Denies previous history of similar pain. No new leg pain/swelling. No recent trauma or surgery. No prolonged immobilization or recent travel. No history of DVT or PE. No family history of blood clots or coagulation disorder. No exogenous estrogen/OCP use. Reports mild lightheadedness, no spinning sensation/vertigo.     The suprapubic pain is reportedly worse when her bladder is full. Notes urinary frequency as well. Does report blood sugar has been slightly higher than usual at around 200.       History provided by:  Patient   used: No        Prior to Admission Medications   Prescriptions Last Dose Informant Patient Reported? Taking?   Ascorbic Acid (vitamin C) 1000 MG tablet   No No   Sig: Take 1 tablet (1,000 mg total) by mouth 2 (two) times a day for 7 days   Multiple Vitamin (multivitamin) capsule   No No   Sig: Take 1 capsule by mouth daily for 7 days   acetaminophen (TYLENOL) 325 mg tablet   No No   Sig: Take 3 tablets (975 mg total) by mouth every 6 (six) hours as needed for mild pain or fever   albuterol (2.5 mg/3 mL) 0.083 % nebulizer solution   No No   Sig: Take 3 mL (2.5 mg total) by nebulization every 6 (six) hours as needed for wheezing or shortness of breath   albuterol (ProAir HFA) 90 mcg/act inhaler   No No   Sig: Inhale 2 puffs every 6 (six) hours as needed for wheezing   aluminum-magnesium hydroxide-simethicone (MAALOX) 200-200-20 MG/5ML SUSP   No No   Sig: Take 15 mL by mouth 4 (four) times a day (before meals  and at bedtime)   buPROPion (WELLBUTRIN SR) 200 MG 12 hr tablet   Yes No   Sig: Take 200 mg by mouth daily   cholecalciferol (VITAMIN D3) 1,000 units tablet   No No   Sig: Take 2 tablets (2,000 Units total) by mouth daily for 7 days   diclofenac sodium (VOLTAREN) 1 %   No No   Sig: Apply 2 g topically 4 (four) times a day   escitalopram (LEXAPRO) 20 mg tablet   Yes No   Sig: Take 5 mg by mouth daily   gabapentin (NEURONTIN) 600 MG tablet   Yes No   Sig: Take 600 mg by mouth 3 (three) times a day   guaiFENesin (ROBITUSSIN) 100 MG/5ML oral liquid   No No   Sig: Take 5-10 mL (100-200 mg total) by mouth every 4 (four) hours as needed for cough   ibuprofen (MOTRIN) 400 mg tablet   No No   Sig: Take 1 tablet (400 mg total) by mouth every 6 (six) hours as needed for mild pain, moderate pain or fever   ibuprofen (MOTRIN) 600 mg tablet   No No   Sig: Take 1 tablet (600 mg total) by mouth every 6 (six) hours as needed for mild pain   ipratropium (ATROVENT) 0.02 % nebulizer solution   No No   Sig: Take 1 vial (0.5 mg total) by nebulization 4 (four) times a day   lidocaine (Lidoderm) 5 %   No No   Sig: Apply 1 patch topically daily Remove & Discard patch within 12 hours or as directed by MD   loratadine (CLARITIN) 10 mg tablet   No No   Sig: Take 1 tablet (10 mg total) by mouth daily   metFORMIN (GLUCOPHAGE) 850 mg tablet   Yes No   Sig: Take 850 mg by mouth daily with breakfast   methocarbamol (ROBAXIN) 500 mg tablet   No No   Sig: Take 1 tablet (500 mg total) by mouth 2 (two) times a day   metoclopramide (Reglan) 10 mg tablet   No No   Sig: Take 1 tablet (10 mg total) by mouth every 6 (six) hours PRN nausea, HA   naproxen (Naprosyn) 500 mg tablet   No No   Sig: Take 1 tablet (500 mg total) by mouth 2 (two) times a day with meals PRN HA   nystatin (MYCOSTATIN) powder   No No   Sig: Apply topically 4 (four) times a day   ondansetron (ZOFRAN) 4 mg tablet   No No   Sig: Take 1 tablet (4 mg total) by mouth every 6 (six) hours    pantoprazole (PROTONIX) 20 mg tablet   No No   Sig: Take 1 tablet (20 mg total) by mouth daily   traMADol (ULTRAM) 50 mg tablet   Yes No   Sig: Take 50 mg by mouth every 6 (six) hours as needed for moderate pain      Facility-Administered Medications: None       Past Medical History:   Diagnosis Date    Anxiety     Arthritis     Bipolar disease, chronic (HCC)     Diabetes mellitus (HCC)        Past Surgical History:   Procedure Laterality Date    NO PAST SURGERIES         History reviewed. No pertinent family history.  I have reviewed and agree with the history as documented.    E-Cigarette/Vaping    E-Cigarette Use Never User      E-Cigarette/Vaping Substances     Social History     Tobacco Use    Smoking status: Every Day     Current packs/day: 0.50     Types: Cigarettes    Smokeless tobacco: Never   Vaping Use    Vaping status: Never Used   Substance Use Topics    Alcohol use: Never    Drug use: Yes     Types: Marijuana     Comment: rare       Review of Systems   Constitutional:  Negative for chills and fever.   HENT:  Negative for congestion, rhinorrhea and sore throat.    Eyes:  Negative for pain and visual disturbance.   Respiratory:  Negative for cough, shortness of breath and wheezing.    Cardiovascular:  Positive for chest pain. Negative for palpitations and leg swelling.   Gastrointestinal:  Positive for abdominal pain. Negative for diarrhea, nausea and vomiting.   Genitourinary:  Positive for frequency. Negative for dysuria, urgency, vaginal bleeding and vaginal discharge.   Musculoskeletal:  Negative for back pain, neck pain and neck stiffness.   Skin:  Negative for rash and wound.   Neurological:  Positive for light-headedness. Negative for dizziness, weakness and numbness.       Physical Exam  Physical Exam  Constitutional:       General: She is not in acute distress.     Appearance: She is well-developed. She is not diaphoretic.   HENT:      Head: Normocephalic and atraumatic.      Right Ear:  External ear normal.      Left Ear: External ear normal.   Eyes:      Extraocular Movements:      Right eye: Normal extraocular motion.      Left eye: Normal extraocular motion.      Conjunctiva/sclera: Conjunctivae normal.      Pupils: Pupils are equal, round, and reactive to light.   Cardiovascular:      Rate and Rhythm: Normal rate and regular rhythm.   Pulmonary:      Effort: Pulmonary effort is normal. No accessory muscle usage or respiratory distress.      Breath sounds: No wheezing or rales.      Comments: TTP to R anterior chest wall reproducing pain. No rashes/lesions to area  Chest:      Chest wall: Tenderness present.   Abdominal:      General: Abdomen is flat. There is no distension.      Tenderness: There is no abdominal tenderness. There is no right CVA tenderness, left CVA tenderness or guarding.   Musculoskeletal:      Cervical back: Normal range of motion. No rigidity.   Skin:     General: Skin is warm and dry.      Capillary Refill: Capillary refill takes less than 2 seconds.      Findings: No erythema or rash.   Neurological:      Mental Status: She is alert and oriented to person, place, and time.      Motor: No abnormal muscle tone.      Coordination: Coordination normal.   Psychiatric:         Behavior: Behavior normal.         Thought Content: Thought content normal.         Judgment: Judgment normal.         Vital Signs  ED Triage Vitals [03/07/24 0702]   Temperature Pulse Respirations Blood Pressure SpO2   98.6 °F (37 °C) 89 17 122/69 99 %      Temp Source Heart Rate Source Patient Position - Orthostatic VS BP Location FiO2 (%)   Oral Monitor Lying Right arm --      Pain Score       8           Vitals:    03/07/24 0702 03/07/24 0745 03/07/24 0945   BP: 122/69 115/56 111/60   Pulse: 89 83 70   Patient Position - Orthostatic VS: Lying           Visual Acuity      ED Medications  Medications - No data to display    Diagnostic Studies  Results Reviewed       Procedure Component Value Units  Date/Time    Urine Microscopic [[id number]]  (Abnormal) Collected: 03/07/24 1005    Lab Status: Final result Specimen: Urine, Clean Catch Updated: 03/07/24 1021     RBC, UA 1-2 /hpf      WBC, UA 1-2 /hpf      Epithelial Cells Occasional /hpf      Bacteria, UA Occasional /hpf      MUCUS THREADS Occasional    POCT pregnancy, urine [[id number]]  (Normal) Resulted: 03/07/24 1008    Lab Status: Final result Updated: 03/07/24 1008     EXT Preg Test, Ur Negative     Control Valid    Urine Macroscopic, POC [[id number]]  (Abnormal) Collected: 03/07/24 1005    Lab Status: Final result Specimen: Urine Updated: 03/07/24 1006     Color, UA Yellow     Clarity, UA Clear     pH, UA 5.5     Leukocytes, UA Negative     Nitrite, UA Negative     Protein, UA Trace mg/dl      Glucose, UA Negative mg/dl      Ketones, UA Negative mg/dl      Urobilinogen, UA 1.0 E.U./dl      Bilirubin, UA Negative     Occult Blood, UA Small     Specific Gravity, UA >=1.030    Narrative:      CLINITEK RESULT    HS Troponin 0hr (reflex protocol) [[id number]]  (Normal) Collected: 03/07/24 0734    Lab Status: Final result Specimen: Blood from Hand, Left Updated: 03/07/24 0801     hs TnI 0hr <2 ng/L     Basic metabolic panel [[id number]]  (Abnormal) Collected: 03/07/24 0734    Lab Status: Final result Specimen: Blood from Hand, Left Updated: 03/07/24 0753     Sodium 136 mmol/L      Potassium 3.9 mmol/L      Chloride 107 mmol/L      CO2 22 mmol/L      ANION GAP 7 mmol/L      BUN 13 mg/dL      Creatinine 0.71 mg/dL      Glucose 141 mg/dL      Calcium 9.1 mg/dL      eGFR 107 ml/min/1.73sq m     Narrative:      National Kid[employer] Foundation guidelines for Chronic Kidney Disease (CKD):     Stage 1 with normal or high GFR (GFR > 90 mL/min/1.73 square meters)    Stage 2 Mild CKD (GFR = 60-89 mL/min/1.73 square meters)    Stage 3A Moderate CKD (GFR = 45-59 mL/min/1.73 square meters)    Stage 3B Moderate CKD (GFR = 30-44 mL/min/1.73 square meters)    Stage 4 Severe CKD  (GFR = 15-29 mL/min/1.73 square meters)    Stage 5 End Stage CKD (GFR <15 mL/min/1.73 square meters)  Note: GFR calculation is accurate only with a steady state creatinine    CBC and differential [[id number]]  (Abnormal) Collected: 03/07/24 0734    Lab Status: Final result Specimen: Blood from Hand, Left Updated: 03/07/24 0741     WBC 15.19 Thousand/uL      RBC 4.75 Million/uL      Hemoglobin 12.6 g/dL      Hematocrit 39.6 %      MCV 83 fL      MCH 26.5 pg      MCHC 31.8 g/dL      RDW 17.4 %      MPV 10.3 fL      Platelets 284 Thousands/uL      nRBC 0 /100 WBCs      Neutrophils Relative 69 %      Immat GRANS % 1 %      Lymphocytes Relative 23 %      Monocytes Relative 5 %      Eosinophils Relative 1 %      Basophils Relative 1 %      Neutrophils Absolute 10.80 Thousands/µL      Immature Grans Absolute 0.09 Thousand/uL      Lymphocytes Absolute 3.42 Thousands/µL      Monocytes Absolute 0.69 Thousand/µL      Eosinophils Absolute 0.11 Thousand/µL      Basophils Absolute 0.08 Thousands/µL                    XR chest 1 view portable   Final Result by Markos Mike MD (03/07 0855)      No acute cardiopulmonary disease.            Workstation performed: [id number]                    Procedures  ECG 12 Lead Documentation Only    Date/Time: 3/7/2024 7:10 AM    Performed by: Simeon Amor PA-C  Authorized by: Simeon Amor PA-C    Indications / Diagnosis:  Chest pain  ECG reviewed by me, the ED Provider: yes    Patient location:  ED  Previous ECG:     Previous ECG:  Compared to current    Similarity:  No change    Comparison to cardiac monitor: Yes    Interpretation:     Interpretation: non-specific    Rate:     ECG rate:  98    ECG rate assessment: normal    Rhythm:     Rhythm: sinus rhythm    Ectopy:     Ectopy: none    QRS:     QRS axis:  Normal    QRS intervals:  Normal  Conduction:     Conduction: normal    ST segments:     ST segments:  Normal  T waves:     T waves: normal    Other findings:     Other  findings comment:  QTc 482           ED Course             HEART Risk Score      Flowsheet Row Most Recent Value   Heart Score Risk Calculator    History 0 Filed at: 03/07/2024 1116   ECG 0 Filed at: 03/07/2024 1116   Age 0 Filed at: 03/07/2024 1116   Risk Factors 2 Filed at: 03/07/2024 1116   Troponin 0 Filed at: 03/07/2024 1116   HEART Score 2 Filed at: 03/07/2024 1116                                        Medical Decision Making  Right-sided chest pain for the past day, nonexertional nature and nonpleuritic/nonpositional.  She does notice pain with palpation of chest wall.  EKG without acute ischemic changes/ectopy.  X-ray of chest is without acute cardiopulmonary disease on my initial x-ray read.  Troponin is negative.  Slight leukocytosis, nonspecific.  Suspect musculoskeletal pain given well localized, reproducible, remainder of workup reassuring.  Patient does report she sometimes gets anxiety episodes which she believes gives her chest pain.  Request medication as needed for anxiety, will prescribe Atarax.  Will discharge for follow-up with primary care physician.    Notes some ongoing urinary frequency and suprapubic abdominal pain.  Does note fluctuating glucose levels possibly contributing to urinary frequency.  No significant abdominal tenderness noted on exam.  No fevers or chills.  Urine dip/micro without evidence of urinary tract infection.  Supportive care, follow-up with primary care physician.    Amount and/or Complexity of Data Reviewed  Labs: ordered.  Radiology: ordered.    Risk  Prescription drug management.             Disposition  Final diagnoses:   Chest pain   Urinary frequency   Anxiety     Time reflects when diagnosis was documented in both MDM as applicable and the Disposition within this note       Time User Action Codes Description Comment    3/7/2024 10:11 AM Simeon Amor Add [R07.9] Chest pain     3/7/2024 10:12 AM Simeon Amor Add [R35.0] Urinary frequency     3/7/2024  10:18 AM Simeon Amor [F41.9] Anxiety           ED Disposition       ED Disposition   Discharge    Condition   Stable    Date/Time   Thu Mar 7, 2024 1011    Comment   Ruben Garner discharge to home/self care.                   Follow-up Information       Follow up With Specialties Details Why Contact Info Additional Information    [facility] hospital Emergency Department Emergency Medicine  If symptoms worsen [address] [facility] [address]  [phone] [facility] Emergency Department, [address], [place], [address]    [facility] Family Medicine Schedule an appointment as soon as possible for a visit   [address] [facility] [address]  [phone]               Discharge Medication List as of 3/7/2024 10:19 AM        START taking these medications    Details   hydrOXYzine HCL (ATARAX) 25 mg tablet Take 1 tablet (25 mg total) by mouth every 6 (six) hours, Starting Thu 3/7/2024, Normal           CONTINUE these medications which have NOT CHANGED    Details   acetaminophen (TYLENOL) 325 mg tablet Take 3 tablets (975 mg total) by mouth every 6 (six) hours as needed for mild pain or fever, Starting Fri 2/28/2020, Normal      albuterol (2.5 mg/3 mL) 0.083 % nebulizer solution Take 3 mL (2.5 mg total) by nebulization every 6 (six) hours as needed for wheezing or shortness of breath, Starting Tue 10/19/2021, Print      albuterol (ProAir HFA) 90 mcg/act inhaler Inhale 2 puffs every 6 (six) hours as needed for wheezing, Starting Tue 10/19/2021, Print      aluminum-magnesium hydroxide-simethicone (MAALOX) 200-200-20 MG/5ML SUSP Take 15 mL by mouth 4 (four) times a day (before meals and at bedtime), Starting Sun 5/19/2019, Print      Ascorbic Acid (vitamin C) 1000 MG tablet Take 1 tablet (1,000 mg total) by mouth 2 (two) times a day for 7 days, Starting Sun 12/20/2020, Until Fri 9/2/2022, Normal      buPROPion (WELLBUTRIN SR) 200  MG 12 hr tablet Take 200 mg by mouth daily, Historical Med      cholecalciferol (VITAMIN D3) 1,000 units tablet Take 2 tablets (2,000 Units total) by mouth daily for 7 days, Starting Sun 12/20/2020, Until Fri 9/2/2022, Normal      diclofenac sodium (VOLTAREN) 1 % Apply 2 g topically 4 (four) times a day, Starting Mon 1/20/2020, Normal      escitalopram (LEXAPRO) 20 mg tablet Take 5 mg by mouth daily, Historical Med      gabapentin (NEURONTIN) 600 MG tablet Take 600 mg by mouth 3 (three) times a day, Historical Med      guaiFENesin (ROBITUSSIN) 100 MG/5ML oral liquid Take 5-10 mL (100-200 mg total) by mouth every 4 (four) hours as needed for cough, Starting Sun 12/20/2020, Normal      !! ibuprofen (MOTRIN) 400 mg tablet Take 1 tablet (400 mg total) by mouth every 6 (six) hours as needed for mild pain, moderate pain or fever, Starting Sat 10/13/2018, Normal      !! ibuprofen (MOTRIN) 600 mg tablet Take 1 tablet (600 mg total) by mouth every 6 (six) hours as needed for mild pain, Starting Fri 2/28/2020, Normal      ipratropium (ATROVENT) 0.02 % nebulizer solution Take 1 vial (0.5 mg total) by nebulization 4 (four) times a day, Starting Mon 9/7/2020, Normal      lidocaine (Lidoderm) 5 % Apply 1 patch topically daily Remove & Discard patch within 12 hours or as directed by MD, Starting Fri 9/2/2022, Normal      loratadine (CLARITIN) 10 mg tablet Take 1 tablet (10 mg total) by mouth daily, Starting Mon 9/7/2020, Normal      metFORMIN (GLUCOPHAGE) 850 mg tablet Take 850 mg by mouth daily with breakfast, Historical Med      methocarbamol (ROBAXIN) 500 mg tablet Take 1 tablet (500 mg total) by mouth 2 (two) times a day, Starting Fri 9/2/2022, Normal      metoclopramide (Reglan) 10 mg tablet Take 1 tablet (10 mg total) by mouth every 6 (six) hours PRN nausea, HA, Starting Wed 8/30/2023, Normal      Multiple Vitamin (multivitamin) capsule Take 1 capsule by mouth daily for 7 days, Starting Sun 12/20/2020, Until Sun 12/27/2020,  Normal      naproxen (Naprosyn) 500 mg tablet Take 1 tablet (500 mg total) by mouth 2 (two) times a day with meals PRN CASAREZ, Starting Wed 8/30/2023, Normal      nystatin (MYCOSTATIN) powder Apply topically 4 (four) times a day, Starting Mon 3/27/2023, Normal      ondansetron (ZOFRAN) 4 mg tablet Take 1 tablet (4 mg total) by mouth every 6 (six) hours, Starting Fri 2/28/2020, Normal      pantoprazole (PROTONIX) 20 mg tablet Take 1 tablet (20 mg total) by mouth daily, Starting Mon 9/30/2019, Print      traMADol (ULTRAM) 50 mg tablet Take 50 mg by mouth every 6 (six) hours as needed for moderate pain, Historical Med       !! - Potential duplicate medications found. Please discuss with provider.          No discharge procedures on file.    PDMP Review       None            ED Provider  Electronically Signed by             Simeon Amor PA-C  03/07/24 6643

## 2024-05-12 ENCOUNTER — HOSPITAL ENCOUNTER (EMERGENCY)
Facility: HOSPITAL | Age: 39
Discharge: LEFT BEFORE DISCHARGE | End: 2024-05-12
Attending: EMERGENCY MEDICINE
Payer: MEDICARE

## 2024-05-12 ENCOUNTER — APPOINTMENT (EMERGENCY)
Dept: CT IMAGING | Facility: HOSPITAL | Age: 39
End: 2024-05-12
Payer: MEDICARE

## 2024-05-12 VITALS
RESPIRATION RATE: 16 BRPM | TEMPERATURE: 97.9 F | BODY MASS INDEX: 46.85 KG/M2 | OXYGEN SATURATION: 99 % | SYSTOLIC BLOOD PRESSURE: 127 MMHG | HEART RATE: 95 BPM | DIASTOLIC BLOOD PRESSURE: 78 MMHG | WEIGHT: 256.17 LBS

## 2024-05-12 DIAGNOSIS — K08.89 DENTALGIA: Primary | ICD-10-CM

## 2024-05-12 LAB
ALBUMIN SERPL BCP-MCNC: 4.2 G/DL (ref 3.5–5)
ALP SERPL-CCNC: 93 U/L (ref 34–104)
ALT SERPL W P-5'-P-CCNC: 13 U/L (ref 7–52)
ANION GAP SERPL CALCULATED.3IONS-SCNC: 6 MMOL/L (ref 4–13)
AST SERPL W P-5'-P-CCNC: 14 U/L (ref 13–39)
BASOPHILS # BLD AUTO: 0.08 THOUSANDS/ÂΜL (ref 0–0.1)
BASOPHILS NFR BLD AUTO: 1 % (ref 0–1)
BILIRUB SERPL-MCNC: 0.28 MG/DL (ref 0.2–1)
BUN SERPL-MCNC: 12 MG/DL (ref 5–25)
CALCIUM SERPL-MCNC: 9.1 MG/DL (ref 8.4–10.2)
CHLORIDE SERPL-SCNC: 108 MMOL/L (ref 96–108)
CO2 SERPL-SCNC: 24 MMOL/L (ref 21–32)
CREAT SERPL-MCNC: 0.67 MG/DL (ref 0.6–1.3)
EOSINOPHIL # BLD AUTO: 0.34 THOUSAND/ÂΜL (ref 0–0.61)
EOSINOPHIL NFR BLD AUTO: 3 % (ref 0–6)
ERYTHROCYTE [DISTWIDTH] IN BLOOD BY AUTOMATED COUNT: 16.6 % (ref 11.6–15.1)
EXT PREGNANCY TEST URINE: NEGATIVE
EXT. CONTROL: NORMAL
GFR SERPL CREATININE-BSD FRML MDRD: 111 ML/MIN/1.73SQ M
GLUCOSE SERPL-MCNC: 101 MG/DL (ref 65–140)
HCT VFR BLD AUTO: 41.1 % (ref 34.8–46.1)
HGB BLD-MCNC: 12.8 G/DL (ref 11.5–15.4)
IMM GRANULOCYTES # BLD AUTO: 0.06 THOUSAND/UL (ref 0–0.2)
IMM GRANULOCYTES NFR BLD AUTO: 1 % (ref 0–2)
LYMPHOCYTES # BLD AUTO: 3.44 THOUSANDS/ÂΜL (ref 0.6–4.47)
LYMPHOCYTES NFR BLD AUTO: 28 % (ref 14–44)
MCH RBC QN AUTO: 26.6 PG (ref 26.8–34.3)
MCHC RBC AUTO-ENTMCNC: 31.1 G/DL (ref 31.4–37.4)
MCV RBC AUTO: 85 FL (ref 82–98)
MONOCYTES # BLD AUTO: 0.65 THOUSAND/ÂΜL (ref 0.17–1.22)
MONOCYTES NFR BLD AUTO: 5 % (ref 4–12)
NEUTROPHILS # BLD AUTO: 7.82 THOUSANDS/ÂΜL (ref 1.85–7.62)
NEUTS SEG NFR BLD AUTO: 62 % (ref 43–75)
NRBC BLD AUTO-RTO: 0 /100 WBCS
PLATELET # BLD AUTO: 288 THOUSANDS/UL (ref 149–390)
PMV BLD AUTO: 10.3 FL (ref 8.9–12.7)
POTASSIUM SERPL-SCNC: 4.2 MMOL/L (ref 3.5–5.3)
PROT SERPL-MCNC: 7.4 G/DL (ref 6.4–8.4)
RBC # BLD AUTO: 4.81 MILLION/UL (ref 3.81–5.12)
SODIUM SERPL-SCNC: 138 MMOL/L (ref 135–147)
WBC # BLD AUTO: 12.39 THOUSAND/UL (ref 4.31–10.16)

## 2024-05-12 PROCEDURE — 81025 URINE PREGNANCY TEST: CPT

## 2024-05-12 PROCEDURE — 96375 TX/PRO/DX INJ NEW DRUG ADDON: CPT

## 2024-05-12 PROCEDURE — 99283 EMERGENCY DEPT VISIT LOW MDM: CPT

## 2024-05-12 PROCEDURE — 96365 THER/PROPH/DIAG IV INF INIT: CPT

## 2024-05-12 PROCEDURE — 99284 EMERGENCY DEPT VISIT MOD MDM: CPT

## 2024-05-12 PROCEDURE — 36415 COLL VENOUS BLD VENIPUNCTURE: CPT

## 2024-05-12 PROCEDURE — 85025 COMPLETE CBC W/AUTO DIFF WBC: CPT

## 2024-05-12 PROCEDURE — 70491 CT SOFT TISSUE NECK W/DYE: CPT

## 2024-05-12 PROCEDURE — 96368 THER/DIAG CONCURRENT INF: CPT

## 2024-05-12 PROCEDURE — 80053 COMPREHEN METABOLIC PANEL: CPT

## 2024-05-12 RX ORDER — AMOXICILLIN AND CLAVULANATE POTASSIUM 875; 125 MG/1; MG/1
1 TABLET, FILM COATED ORAL EVERY 12 HOURS SCHEDULED
Qty: 14 TABLET | Refills: 0 | Status: SHIPPED | OUTPATIENT
Start: 2024-05-12 | End: 2024-05-19

## 2024-05-12 RX ORDER — CLINDAMYCIN PHOSPHATE 600 MG/50ML
600 INJECTION, SOLUTION INTRAVENOUS ONCE
Status: DISCONTINUED | OUTPATIENT
Start: 2024-05-12 | End: 2024-05-12

## 2024-05-12 RX ORDER — KETOROLAC TROMETHAMINE 30 MG/ML
15 INJECTION, SOLUTION INTRAMUSCULAR; INTRAVENOUS ONCE
Status: COMPLETED | OUTPATIENT
Start: 2024-05-12 | End: 2024-05-12

## 2024-05-12 RX ORDER — CEFAZOLIN SODIUM 2 G/50ML
2000 SOLUTION INTRAVENOUS ONCE
Status: COMPLETED | OUTPATIENT
Start: 2024-05-12 | End: 2024-05-12

## 2024-05-12 RX ORDER — ACETAMINOPHEN 325 MG/1
975 TABLET ORAL ONCE
Status: COMPLETED | OUTPATIENT
Start: 2024-05-12 | End: 2024-05-12

## 2024-05-12 RX ADMIN — CEFAZOLIN SODIUM 2000 MG: 2 SOLUTION INTRAVENOUS at 11:11

## 2024-05-12 RX ADMIN — SODIUM CHLORIDE, SODIUM LACTATE, POTASSIUM CHLORIDE, AND CALCIUM CHLORIDE 1000 ML: .6; .31; .03; .02 INJECTION, SOLUTION INTRAVENOUS at 11:10

## 2024-05-12 RX ADMIN — KETOROLAC TROMETHAMINE 15 MG: 30 INJECTION, SOLUTION INTRAMUSCULAR; INTRAVENOUS at 11:08

## 2024-05-12 RX ADMIN — ACETAMINOPHEN 975 MG: 325 TABLET, FILM COATED ORAL at 11:06

## 2024-05-12 RX ADMIN — IOHEXOL 70 ML: 350 INJECTION, SOLUTION INTRAVENOUS at 12:04

## 2024-05-12 RX ADMIN — DEXAMETHASONE SODIUM PHOSPHATE 10 MG: 10 INJECTION, SOLUTION INTRAMUSCULAR; INTRAVENOUS at 11:06

## 2024-05-12 NOTE — Clinical Note
Ruben Garner was seen and treated in our emergency department on 5/12/2024.                Diagnosis:     Ruben  may return to work on return date.    She may return on this date: 05/13/2024         If you have any questions or concerns, please don't hesitate to call.      Megan Briscoe PA-C    ______________________________           _______________          _______________  Hospital Representative                              Date                                Time

## 2024-11-10 ENCOUNTER — APPOINTMENT (EMERGENCY)
Dept: RADIOLOGY | Facility: HOSPITAL | Age: 39
End: 2024-11-10
Payer: MEDICARE

## 2024-11-10 ENCOUNTER — HOSPITAL ENCOUNTER (EMERGENCY)
Facility: HOSPITAL | Age: 39
Discharge: HOME | End: 2024-11-10
Attending: EMERGENCY MEDICINE
Payer: MEDICARE

## 2024-11-10 VITALS
RESPIRATION RATE: 22 BRPM | BODY MASS INDEX: 45.69 KG/M2 | DIASTOLIC BLOOD PRESSURE: 87 MMHG | HEART RATE: 107 BPM | OXYGEN SATURATION: 98 % | TEMPERATURE: 98 F | SYSTOLIC BLOOD PRESSURE: 151 MMHG | WEIGHT: 249.8 LBS

## 2024-11-10 DIAGNOSIS — J40 BRONCHITIS: ICD-10-CM

## 2024-11-10 DIAGNOSIS — H66.90 OTITIS MEDIA: ICD-10-CM

## 2024-11-10 DIAGNOSIS — J45.41 MODERATE PERSISTENT ASTHMA WITH EXACERBATION: Primary | ICD-10-CM

## 2024-11-10 DIAGNOSIS — Z20.822 ENCOUNTER FOR LABORATORY TESTING FOR COVID-19 VIRUS: ICD-10-CM

## 2024-11-10 LAB
FLUAV AG UPPER RESP QL IA.RAPID: NEGATIVE
FLUBV AG UPPER RESP QL IA.RAPID: NEGATIVE
SARS-COV+SARS-COV-2 AG RESP QL IA.RAPID: NEGATIVE

## 2024-11-10 PROCEDURE — 94640 AIRWAY INHALATION TREATMENT: CPT

## 2024-11-10 PROCEDURE — 99284 EMERGENCY DEPT VISIT MOD MDM: CPT | Performed by: EMERGENCY MEDICINE

## 2024-11-10 PROCEDURE — 71045 X-RAY EXAM CHEST 1 VIEW: CPT

## 2024-11-10 PROCEDURE — 87804 INFLUENZA ASSAY W/OPTIC: CPT | Performed by: EMERGENCY MEDICINE

## 2024-11-10 PROCEDURE — 87811 SARS-COV-2 COVID19 W/OPTIC: CPT | Performed by: EMERGENCY MEDICINE

## 2024-11-10 PROCEDURE — 99283 EMERGENCY DEPT VISIT LOW MDM: CPT

## 2024-11-10 RX ORDER — IPRATROPIUM BROMIDE AND ALBUTEROL SULFATE 2.5; .5 MG/3ML; MG/3ML
3 SOLUTION RESPIRATORY (INHALATION) ONCE
Status: COMPLETED | OUTPATIENT
Start: 2024-11-10 | End: 2024-11-10

## 2024-11-10 RX ORDER — ALBUTEROL SULFATE 90 UG/1
2 INHALANT RESPIRATORY (INHALATION) EVERY 6 HOURS PRN
Qty: 8.5 G | Refills: 0 | Status: SHIPPED | OUTPATIENT
Start: 2024-11-10

## 2024-11-10 RX ORDER — ALBUTEROL SULFATE 0.83 MG/ML
2.5 SOLUTION RESPIRATORY (INHALATION) EVERY 6 HOURS PRN
Qty: 75 ML | Refills: 0 | Status: SHIPPED | OUTPATIENT
Start: 2024-11-10

## 2024-11-10 RX ORDER — PREDNISONE 50 MG/1
50 TABLET ORAL DAILY
Qty: 4 TABLET | Refills: 0 | Status: SHIPPED | OUTPATIENT
Start: 2024-11-10 | End: 2024-11-14

## 2024-11-10 RX ADMIN — IPRATROPIUM BROMIDE AND ALBUTEROL SULFATE 3 ML: 2.5; .5 SOLUTION RESPIRATORY (INHALATION) at 11:46

## 2024-11-10 RX ADMIN — IPRATROPIUM BROMIDE AND ALBUTEROL SULFATE 3 ML: 2.5; .5 SOLUTION RESPIRATORY (INHALATION) at 12:27

## 2024-11-10 RX ADMIN — PREDNISONE 50 MG: 20 TABLET ORAL at 11:46

## 2024-11-10 NOTE — ED PROVIDER NOTES
Time reflects when diagnosis was documented in both MDM as applicable and the Disposition within this note       Time User Action Codes Description Comment    11/10/2024  1:17 PM Nataly Foleyat Add [J45.41] Moderate persistent asthma with exacerbation     11/10/2024  2:45 PM Nataly Foleyat Add [H66.90] Otitis media     11/10/2024  2:45 PM Og Virat Add [J40] Bronchitis     11/10/2024  2:45 PM Nataly Foleyat Add [Z20.822] Encounter for laboratory testing for COVID-19 virus           ED Disposition       ED Disposition   Discharge    Condition   Stable    Date/Time   Sun Nov 10, 2024  1:17 PM    Comment   Ruben Garner discharge to home/self care.                   Assessment & Plan       Medical Decision Making  39-year-old female with asthma presenting emerged department shortness of breath differential diagnose includes COVID, influenza, asthma exacerbation, pneumonia, pneumothorax.  On my interpretation of the chest x-ray there is no pneumonia or pneumothorax.  Wheezing resolved after DuoNeb.  Prednisone started.  Likely asthma exacerbation.  COVID flu testing negative.    Amount and/or Complexity of Data Reviewed  Labs: ordered.  Radiology: ordered.    Risk  Prescription drug management.             Medications   ipratropium-albuterol (DUO-NEB) 0.5-2.5 mg/3 mL inhalation solution 3 mL (3 mL Nebulization Given 11/10/24 1146)   predniSONE tablet 50 mg (50 mg Oral Given 11/10/24 1146)   ipratropium-albuterol (DUO-NEB) 0.5-2.5 mg/3 mL inhalation solution 3 mL (3 mL Nebulization Given 11/10/24 1227)       ED Risk Strat Scores                           SBIRT 20yo+      Flowsheet Row Most Recent Value   Initial Alcohol Screen: US AUDIT-C     1. How often do you have a drink containing alcohol? 0 Filed at: 11/10/2024 1135   2. How many drinks containing alcohol do you have on a typical day you are drinking?  0 Filed at: 11/10/2024 1135   3a. Male UNDER 65: How often do you have five or more drinks on one occasion?  0 Filed at: 11/10/2024 1135   3b. FEMALE Any Age, or MALE 65+: How often do you have 4 or more drinks on one occassion? 0 Filed at: 11/10/2024 1135   Audit-C Score 0 Filed at: 11/10/2024 1135   JONNIE: How many times in the past year have you...    Used an illegal drug or used a prescription medication for non-medical reasons? Never Filed at: 11/10/2024 1135                            History of Present Illness       Chief Complaint   Patient presents with    Asthma     Exacerbated since yesterday; neb tx and inhaler used at home with no relief       Past Medical History:   Diagnosis Date    Anxiety     Arthritis     Bipolar disease, chronic (HCC)     Diabetes mellitus (HCC)       Past Surgical History:   Procedure Laterality Date    NO PAST SURGERIES        History reviewed. No pertinent family history.   Social History     Tobacco Use    Smoking status: Every Day     Current packs/day: 0.50     Types: Cigarettes    Smokeless tobacco: Never   Vaping Use    Vaping status: Never Used   Substance Use Topics    Alcohol use: Never    Drug use: Yes     Types: Marijuana     Comment: rare      E-Cigarette/Vaping    E-Cigarette Use Never User       E-Cigarette/Vaping Substances    Nicotine No     Flavoring Yes       I have reviewed and agree with the history as documented.     39-year-old female presenting to the emergency department with shortness of breath over the past 2 days.  Using inhaler at home without relief.        Review of Systems   Constitutional:  Negative for chills and fever.   HENT:  Negative for ear pain and sore throat.    Eyes:  Negative for pain and visual disturbance.   Respiratory:  Positive for shortness of breath and wheezing. Negative for cough.    Cardiovascular:  Negative for chest pain and palpitations.   Gastrointestinal:  Negative for abdominal pain and vomiting.   Genitourinary:  Negative for dysuria and hematuria.   Musculoskeletal:  Negative for arthralgias and back pain.   Skin:  Negative  for color change and rash.   Neurological:  Negative for seizures and syncope.   All other systems reviewed and are negative.          Objective       ED Triage Vitals [11/10/24 1133]   Temperature Pulse Blood Pressure Respirations SpO2 Patient Position - Orthostatic VS   98 °F (36.7 °C) (!) 107 151/87 22 98 % --      Temp src Heart Rate Source BP Location FiO2 (%) Pain Score    -- Monitor -- -- --      Vitals      Date and Time Temp Pulse SpO2 Resp BP Pain Score FACES Pain Rating User   11/10/24 1133 98 °F (36.7 °C) 107 98 % 22 151/87 -- -- AM            Physical Exam  Vitals and nursing note reviewed.   Constitutional:       General: She is not in acute distress.     Appearance: She is well-developed.   HENT:      Head: Normocephalic and atraumatic.      Nose: Congestion and rhinorrhea present.      Mouth/Throat:      Mouth: Mucous membranes are moist.   Eyes:      Extraocular Movements: Extraocular movements intact.      Conjunctiva/sclera: Conjunctivae normal.      Pupils: Pupils are equal, round, and reactive to light.   Cardiovascular:      Rate and Rhythm: Normal rate and regular rhythm.      Heart sounds: No murmur heard.  Pulmonary:      Effort: Pulmonary effort is normal. No respiratory distress.      Breath sounds: Wheezing present.   Abdominal:      Palpations: Abdomen is soft.      Tenderness: There is no abdominal tenderness.   Musculoskeletal:      Cervical back: Neck supple.   Skin:     General: Skin is warm and dry.   Neurological:      Mental Status: She is alert.         Results Reviewed       Procedure Component Value Units Date/Time    FLU/COVID Rapid Antigen (30 min. TAT) - Preferred screening test in ED [[id number]]  (Normal) Collected: 11/10/24 1145    Lab Status: Final result Specimen: Nares from Nose Updated: 11/10/24 1211     SARS COV Rapid Antigen Negative     Influenza A Rapid Antigen Negative     Influenza B Rapid Antigen Negative    Narrative:      This test has been performed using the  [employer] Narcisa 2 FLU+SARS Antigen test under the Emergency Use Authorization (EUA). This test has been validated by the  and verified by the performing laboratory. The Narcisa uses lateral flow immunofluorescent sandwich assay to detect SARS-COV, Influenza A and Influenza B Antigen.     The [employer] Narcisa 2 SARS Antigen test does not differentiate between SARS-CoV and SARS-CoV-2.     Negative results are presumptive and may be confirmed with a molecular assay, if necessary, for patient management. Negative results do not rule out SARS-CoV-2 or influenza infection and should not be used as the sole basis for treatment or patient management decisions. A negative test result may occur if the level of antigen in a sample is below the limit of detection of this test.     Positive results are indicative of the presence of viral antigens, but do not rule out bacterial infection or co-infection with other viruses.     All test results should be used as an adjunct to clinical observations and other information available to the provider.    FOR PEDIATRIC PATIENTS - copy/paste COVID Guidelines URL to browser: [email or web address]            XR chest 1 view portable    (Results Pending)       Procedures    ED Medication and Procedure Management   Prior to Admission Medications   Prescriptions Last Dose Informant Patient Reported? Taking?   Ascorbic Acid (vitamin C) 1000 MG tablet   No No   Sig: Take 1 tablet (1,000 mg total) by mouth 2 (two) times a day for 7 days   Multiple Vitamin (multivitamin) capsule   No No   Sig: Take 1 capsule by mouth daily for 7 days   acetaminophen (TYLENOL) 325 mg tablet   No No   Sig: Take 3 tablets (975 mg total) by mouth every 6 (six) hours as needed for mild pain or fever   albuterol (2.5 mg/3 mL) 0.083 % nebulizer solution   No No   Sig: Take 3 mL (2.5 mg total) by nebulization every 6 (six) hours as needed for wheezing or shortness of  breath   albuterol (ProAir HFA) 90 mcg/act inhaler   No No   Sig: Inhale 2 puffs every 6 (six) hours as needed for wheezing   aluminum-magnesium hydroxide-simethicone (MAALOX) 200-200-20 MG/5ML SUSP   No No   Sig: Take 15 mL by mouth 4 (four) times a day (before meals and at bedtime)   buPROPion (WELLBUTRIN SR) 200 MG 12 hr tablet   Yes No   Sig: Take 200 mg by mouth daily   cholecalciferol (VITAMIN D3) 1,000 units tablet   No No   Sig: Take 2 tablets (2,000 Units total) by mouth daily for 7 days   diclofenac sodium (VOLTAREN) 1 %   No No   Sig: Apply 2 g topically 4 (four) times a day   escitalopram (LEXAPRO) 20 mg tablet   Yes No   Sig: Take 5 mg by mouth daily   gabapentin (NEURONTIN) 600 MG tablet   Yes No   Sig: Take 600 mg by mouth 3 (three) times a day   guaiFENesin (ROBITUSSIN) 100 MG/5ML oral liquid   No No   Sig: Take 5-10 mL (100-200 mg total) by mouth every 4 (four) hours as needed for cough   hydrOXYzine HCL (ATARAX) 25 mg tablet   No No   Sig: Take 1 tablet (25 mg total) by mouth every 6 (six) hours   ibuprofen (MOTRIN) 400 mg tablet   No No   Sig: Take 1 tablet (400 mg total) by mouth every 6 (six) hours as needed for mild pain, moderate pain or fever   ibuprofen (MOTRIN) 600 mg tablet   No No   Sig: Take 1 tablet (600 mg total) by mouth every 6 (six) hours as needed for mild pain   ipratropium (ATROVENT) 0.02 % nebulizer solution   No No   Sig: Take 1 vial (0.5 mg total) by nebulization 4 (four) times a day   lidocaine (Lidoderm) 5 %   No No   Sig: Apply 1 patch topically daily Remove & Discard patch within 12 hours or as directed by MD   loratadine (CLARITIN) 10 mg tablet   No No   Sig: Take 1 tablet (10 mg total) by mouth daily   metFORMIN (GLUCOPHAGE) 850 mg tablet   Yes No   Sig: Take 850 mg by mouth daily with breakfast   methocarbamol (ROBAXIN) 500 mg tablet   No No   Sig: Take 1 tablet (500 mg total) by mouth 2 (two) times a day   metoclopramide (Reglan) 10 mg tablet   No No   Sig: Take 1  tablet (10 mg total) by mouth every 6 (six) hours PRN nausea, HA   naproxen (Naprosyn) 500 mg tablet   No No   Sig: Take 1 tablet (500 mg total) by mouth 2 (two) times a day with meals PRN HA   nystatin (MYCOSTATIN) powder   No No   Sig: Apply topically 4 (four) times a day   ondansetron (ZOFRAN) 4 mg tablet   No No   Sig: Take 1 tablet (4 mg total) by mouth every 6 (six) hours   pantoprazole (PROTONIX) 20 mg tablet   No No   Sig: Take 1 tablet (20 mg total) by mouth daily   traMADol (ULTRAM) 50 mg tablet   Yes No   Sig: Take 50 mg by mouth every 6 (six) hours as needed for moderate pain      Facility-Administered Medications: None     Discharge Medication List as of 11/10/2024  1:18 PM        START taking these medications    Details   !! albuterol (2.5 mg/3 mL) 0.083 % nebulizer solution Take 3 mL (2.5 mg total) by nebulization every 6 (six) hours as needed for wheezing or shortness of breath, Starting Sun 11/10/2024, Normal      predniSONE 50 mg tablet Take 1 tablet (50 mg total) by mouth daily for 4 days, Starting Sun 11/10/2024, Until u 11/14/2024, Normal       !! - Potential duplicate medications found. Please discuss with provider.        CONTINUE these medications which have NOT CHANGED    Details   acetaminophen (TYLENOL) 325 mg tablet Take 3 tablets (975 mg total) by mouth every 6 (six) hours as needed for mild pain or fever, Starting Fri 2/28/2020, Normal      !! albuterol (2.5 mg/3 mL) 0.083 % nebulizer solution Take 3 mL (2.5 mg total) by nebulization every 6 (six) hours as needed for wheezing or shortness of breath, Starting Tue 10/19/2021, Print      aluminum-magnesium hydroxide-simethicone (MAALOX) 200-200-20 MG/5ML SUSP Take 15 mL by mouth 4 (four) times a day (before meals and at bedtime), Starting Sun 5/19/2019, Print      Ascorbic Acid (vitamin C) 1000 MG tablet Take 1 tablet (1,000 mg total) by mouth 2 (two) times a day for 7 days, Starting Sun 12/20/2020, Until Fri 9/2/2022, Normal       buPROPion (WELLBUTRIN SR) 200 MG 12 hr tablet Take 200 mg by mouth daily, Historical Med      cholecalciferol (VITAMIN D3) 1,000 units tablet Take 2 tablets (2,000 Units total) by mouth daily for 7 days, Starting Sun 12/20/2020, Until Fri 9/2/2022, Normal      diclofenac sodium (VOLTAREN) 1 % Apply 2 g topically 4 (four) times a day, Starting Mon 1/20/2020, Normal      escitalopram (LEXAPRO) 20 mg tablet Take 5 mg by mouth daily, Historical Med      gabapentin (NEURONTIN) 600 MG tablet Take 600 mg by mouth 3 (three) times a day, Historical Med      guaiFENesin (ROBITUSSIN) 100 MG/5ML oral liquid Take 5-10 mL (100-200 mg total) by mouth every 4 (four) hours as needed for cough, Starting Sun 12/20/2020, Normal      hydrOXYzine HCL (ATARAX) 25 mg tablet Take 1 tablet (25 mg total) by mouth every 6 (six) hours, Starting Thu 3/7/2024, Normal      !! ibuprofen (MOTRIN) 400 mg tablet Take 1 tablet (400 mg total) by mouth every 6 (six) hours as needed for mild pain, moderate pain or fever, Starting Sat 10/13/2018, Normal      !! ibuprofen (MOTRIN) 600 mg tablet Take 1 tablet (600 mg total) by mouth every 6 (six) hours as needed for mild pain, Starting Fri 2/28/2020, Normal      ipratropium (ATROVENT) 0.02 % nebulizer solution Take 1 vial (0.5 mg total) by nebulization 4 (four) times a day, Starting Mon 9/7/2020, Normal      lidocaine (Lidoderm) 5 % Apply 1 patch topically daily Remove & Discard patch within 12 hours or as directed by MD, Starting Fri 9/2/2022, Normal      loratadine (CLARITIN) 10 mg tablet Take 1 tablet (10 mg total) by mouth daily, Starting Mon 9/7/2020, Normal      metFORMIN (GLUCOPHAGE) 850 mg tablet Take 850 mg by mouth daily with breakfast, Historical Med      methocarbamol (ROBAXIN) 500 mg tablet Take 1 tablet (500 mg total) by mouth 2 (two) times a day, Starting Fri 9/2/2022, Normal      metoclopramide (Reglan) 10 mg tablet Take 1 tablet (10 mg total) by mouth every 6 (six) hours PRN nausea, HA,  Starting Wed 8/30/2023, Normal      Multiple Vitamin (multivitamin) capsule Take 1 capsule by mouth daily for 7 days, Starting Sun 12/20/2020, Until Sun 12/27/2020, Normal      naproxen (Naprosyn) 500 mg tablet Take 1 tablet (500 mg total) by mouth 2 (two) times a day with meals PRN CASAREZ, Starting Wed 8/30/2023, Normal      nystatin (MYCOSTATIN) powder Apply topically 4 (four) times a day, Starting Mon 3/27/2023, Normal      ondansetron (ZOFRAN) 4 mg tablet Take 1 tablet (4 mg total) by mouth every 6 (six) hours, Starting Fri 2/28/2020, Normal      pantoprazole (PROTONIX) 20 mg tablet Take 1 tablet (20 mg total) by mouth daily, Starting Mon 9/30/2019, Print      traMADol (ULTRAM) 50 mg tablet Take 50 mg by mouth every 6 (six) hours as needed for moderate pain, Historical Med      albuterol (ProAir HFA) 90 mcg/act inhaler Inhale 2 puffs every 6 (six) hours as needed for wheezing, Starting Tue 10/19/2021, Print       !! - Potential duplicate medications found. Please discuss with provider.        No discharge procedures on file.  ED SEPSIS DOCUMENTATION   Time reflects when diagnosis was documented in both MDM as applicable and the Disposition within this note       Time User Action Codes Description Comment    11/10/2024  1:17 PM Froylan Foley [J45.41] Moderate persistent asthma with exacerbation     11/10/2024  2:45 PM Froylan Foley [H66.90] Otitis media     11/10/2024  2:45 PM Froylan Foley [J40] Bronchitis     11/10/2024  2:45 PM Froylan Foley [Z20.822] Encounter for laboratory testing for COVID-19 virus                  Froylan Foley MD  11/10/24 5341

## 2024-11-10 NOTE — Clinical Note
Ruben Garner was seen and treated in our emergency department on 11/10/2024.                Diagnosis:     Ruben  may return to work on return date.    She may return on this date: 11/12/2024         If you have any questions or concerns, please don't hesitate to call.      Froylan Foley MD    ______________________________           _______________          _______________  Hospital Representative                              Date                                Time

## 2025-03-24 RX ORDER — OXYBUTYNIN CHLORIDE 10 MG/1
10 TABLET, EXTENDED RELEASE ORAL 2 TIMES DAILY
Qty: 180 TABLET | OUTPATIENT
Start: 2025-03-24